# Patient Record
Sex: FEMALE | Race: WHITE | Employment: FULL TIME | URBAN - METROPOLITAN AREA
[De-identification: names, ages, dates, MRNs, and addresses within clinical notes are randomized per-mention and may not be internally consistent; named-entity substitution may affect disease eponyms.]

---

## 2017-03-14 ENCOUNTER — TELEPHONE (OUTPATIENT)
Dept: FAMILY MEDICINE CLINIC | Age: 62
End: 2017-03-14

## 2017-03-14 NOTE — TELEPHONE ENCOUNTER
PA initiated for Nexium     Generic Nexium-Esomeprazole was approved coverage 1/13/2017 - 3/14/2018, [id number].

## 2017-04-01 ENCOUNTER — OFFICE VISIT (OUTPATIENT)
Dept: FAMILY MEDICINE CLINIC | Age: 62
End: 2017-04-01

## 2017-04-01 VITALS
RESPIRATION RATE: 18 BRPM | TEMPERATURE: 98.1 F | HEART RATE: 88 BPM | HEIGHT: 62 IN | WEIGHT: 162.6 LBS | SYSTOLIC BLOOD PRESSURE: 120 MMHG | BODY MASS INDEX: 29.92 KG/M2 | OXYGEN SATURATION: 98 % | DIASTOLIC BLOOD PRESSURE: 80 MMHG

## 2017-04-01 DIAGNOSIS — J40 BRONCHITIS: ICD-10-CM

## 2017-04-01 DIAGNOSIS — J01.00 ACUTE NON-RECURRENT MAXILLARY SINUSITIS: Primary | ICD-10-CM

## 2017-04-01 RX ORDER — AMOXICILLIN AND CLAVULANATE POTASSIUM 875; 125 MG/1; MG/1
1 TABLET, FILM COATED ORAL 2 TIMES DAILY
Qty: 14 TAB | Refills: 0 | Status: SHIPPED | OUTPATIENT
Start: 2017-04-01 | End: 2017-04-08

## 2017-04-01 RX ORDER — BENZONATATE 100 MG/1
100 CAPSULE ORAL
Qty: 30 CAP | Refills: 0 | Status: SHIPPED | OUTPATIENT
Start: 2017-04-01 | End: 2017-04-11

## 2017-04-01 NOTE — MR AVS SNAPSHOT
Visit Information Date & Time Provider Department Dept. Phone Encounter #  
 4/1/2017  9:00 AM Sandra Barnes, [address] [facility] [phone] [id number] Follow-up Instructions Return in about 3 months (around 7/1/2017) for physical (no pap). Upcoming Health Maintenance Date Due DTaP/Tdap/Td series (1 - Tdap) 9/23/1977 INFLUENZA AGE 9 TO ADULT 8/1/2016 ZOSTER VACCINE AGE 60> 9/23/2016 BREAST CANCER SCRN MAMMOGRAM 3/8/2019 PAP AKA CERVICAL CYTOLOGY 5/1/2019 COLONOSCOPY 8/22/2019 Allergies as of 4/1/2017  Review Complete On: 4/1/2017 By: Sandra Barnes MD  
  
 Severity Noted Reaction Type Reactions Aspirin  09/12/2014    Nausea and Vomiting Betadine [Povidone-iodine]  12/20/2010    Rash  
 Nsaids (Non-steroidal Anti-inflammatory Drug)  12/20/2010    Nausea and Vomiting Other Medication  12/20/2010    Nausea and Vomiting General anas. .. - Pt does not know what this is. Current Immunizations  Never Reviewed No immunizations on file. Not reviewed this visit You Were Diagnosed With   
  
 Codes Comments Acute non-recurrent maxillary sinusitis    -  Primary ICD-10-CM: J01.00 ICD-9-CM: 461.0 Bronchitis     ICD-10-CM: J40 ICD-9-CM: 841 Vitals BP Pulse Temp Resp Height(growth percentile) Weight(growth percentile) 120/80 88 98.1 °F (36.7 °C) (Oral) 18 5' 2\" (1.575 m) 162 lb 9.6 oz (73.8 kg) SpO2 BMI OB Status Smoking Status 98% 29.74 kg/m2 Postmenopausal Never Smoker Vitals History BMI and BSA Data Body Mass Index Body Surface Area  
 29.74 kg/m 2 1.8 m 2 Preferred Pharmacy Pharmacy Name Phone [facility] [address], [address] [place] [address] [phone] Your Updated Medication List  
  
   
This list is accurate as of: 4/1/17  9:11 AM.  Always use your most recent med list.  
  
  
  
  
 amoxicillin-clavulanate 875-125 mg per tablet Commonly known as:  AUGMENTIN Take 1 Tab by mouth two (2) times a day for 7 days. aspirin delayed-release 81 mg tablet Take 1 Tab by mouth daily. benzonatate 100 mg capsule Commonly known as:  TESSALON Take 1 Cap by mouth three (3) times daily as needed for Cough for up to 10 days. celecoxib 200 mg capsule Commonly known as:  CELEBREX Take 1 Cap by mouth two (2) times a day. esomeprazole 40 mg capsule Commonly known as:  NexIUM  
TAKE ONE (1) CAPSULE(S) TWICE DAILY  
  
 metoprolol tartrate 50 mg tablet Commonly known as:  LOPRESSOR Take 1 Tab by mouth two (2) times a day. RESTASIS 0.05 % ophthalmic emulsion Generic drug:  cycloSPORINE Administer 1 Drop to both eyes two (2) times a day. SUMAtriptan 50 mg tablet Commonly known as:  IMITREX Take 1 Tab by mouth once as needed for Migraine for up to 1 dose. May repeat at 2 hrs  
  
 traMADol 50 mg tablet Commonly known as:  ULTRAM  
Take 1 Tab by mouth every six (6) hours as needed for Pain. VITAMIN B-12 1,000 mcg tablet Generic drug:  cyanocobalamin Take 1,000 mcg by mouth daily. VITAMIN D3 2,000 unit Tab Generic drug:  cholecalciferol (vitamin D3) Take  by mouth. Prescriptions Sent to Pharmacy Refills  
 amoxicillin-clavulanate (AUGMENTIN) 875-125 mg per tablet 0 Sig: Take 1 Tab by mouth two (2) times a day for 7 days. Class: Normal  
 Pharmacy: [address] Ph #: [phone] Route: Oral  
 benzonatate (TESSALON) 100 mg capsule 0 Sig: Take 1 Cap by mouth three (3) times daily as needed for Cough for up to 10 days. Class: Normal  
 Pharmacy: [address] Ph #: [phone] Route: Oral  
  
Follow-up Instructions Return in about 3 months (around 7/1/2017) for physical (no pap). Patient Instructions Sinusitis: Care Instructions Your Care Instructions Sinusitis is an infection of the lining of the sinus cavities in your head. Sinusitis often follows a cold. It causes pain and pressure in your head and face. In most cases, sinusitis gets better on its own in 1 to 2 weeks. But some mild symptoms may last for several weeks. Sometimes antibiotics are needed. Follow-up care is a key part of your treatment and safety. Be sure to make and go to all appointments, and call your doctor if you are having problems. It's also a good idea to know your test results and keep a list of the medicines you take. How can you care for yourself at home? · Take an over-the-counter pain medicine, such as acetaminophen (Tylenol), ibuprofen (Advil, Motrin), or naproxen (Aleve). Read and follow all instructions on the label. · If the doctor prescribed antibiotics, take them as directed. Do not stop taking them just because you feel better. You need to take the full course of antibiotics. · Be careful when taking over-the-counter cold or flu medicines and Tylenol at the same time. Many of these medicines have acetaminophen, which is Tylenol. Read the labels to make sure that you are not taking more than the recommended dose. Too much acetaminophen (Tylenol) can be harmful. · Breathe warm, moist air from a steamy shower, a hot bath, or a sink filled with hot water. Avoid cold, dry air. Using a humidifier in your home may help. Follow the directions for cleaning the machine. · Use saline (saltwater) nasal washes to help keep your nasal passages open and wash out mucus and bacteria. You can buy saline nose drops at a grocery store or drugstore.  Or you can make your own at home by adding 1 teaspoon of salt and 1 teaspoon of baking soda to 2 cups of distilled water. If you make your own, fill a bulb syringe with the solution, insert the tip into your nostril, and squeeze gently. Yoselin Fogo your nose. · Put a hot, wet towel or a warm gel pack on your face 3 or 4 times a day for 5 to 10 minutes each time. · Try a decongestant nasal spray like oxymetazoline (Afrin). Do not use it for more than 3 days in a row. Using it for more than 3 days can make your congestion worse. When should you call for help? Call your doctor now or seek immediate medical care if: 
· You have new or worse swelling or redness in your face or around your eyes. · You have a new or higher fever. Watch closely for changes in your health, and be sure to contact your doctor if: 
· You have new or worse facial pain. · The mucus from your nose becomes thicker (like pus) or has new blood in it. · You are not getting better as expected. Where can you learn more? Go to http://alley-myla.info/. Enter P941 in the search box to learn more about \"Sinusitis: Care Instructions. \" Current as of: July 29, 2016 Content Version: 11.2 © 8821-8208 cinvolve. Care instructions adapted under license by [employer] (which disclaims liability or warranty for this information). If you have questions about a medical condition or this instruction, always ask your healthcare professional. [place] [address] any warranty or liability for your use of this information. Introducing [facility]! Dear Marilee Gonsalez: Thank you for requesting a [employer] account. Our records indicate that you already have an active [employer] account. You can access your account anytime at [email or web address] Did you know that you can access your hospital and ER discharge instructions at any time in [employer]? You can also review all of your test results from your hospital stay or ER visit. Additional Information If you have questions, please visit the Frequently Asked Questions section of the [employer] website at [email or web address] Remember, [employer] is NOT to be used for urgent needs. For medical emergencies, dial 911. Now available from your iPhone and Android! Please provide this summary of care documentation to your next provider. Your primary care clinician is listed as [place] [address], [facility]/s . If you have any questions after today's visit, please call [phone].

## 2017-04-01 NOTE — PATIENT INSTRUCTIONS
Sinusitis: Care Instructions  Your Care Instructions    Sinusitis is an infection of the lining of the sinus cavities in your head. Sinusitis often follows a cold. It causes pain and pressure in your head and face. In most cases, sinusitis gets better on its own in 1 to 2 weeks. But some mild symptoms may last for several weeks. Sometimes antibiotics are needed. Follow-up care is a key part of your treatment and safety. Be sure to make and go to all appointments, and call your doctor if you are having problems. It's also a good idea to know your test results and keep a list of the medicines you take. How can you care for yourself at home? · Take an over-the-counter pain medicine, such as acetaminophen (Tylenol), ibuprofen (Advil, Motrin), or naproxen (Aleve). Read and follow all instructions on the label. · If the doctor prescribed antibiotics, take them as directed. Do not stop taking them just because you feel better. You need to take the full course of antibiotics. · Be careful when taking over-the-counter cold or flu medicines and Tylenol at the same time. Many of these medicines have acetaminophen, which is Tylenol. Read the labels to make sure that you are not taking more than the recommended dose. Too much acetaminophen (Tylenol) can be harmful. · Breathe warm, moist air from a steamy shower, a hot bath, or a sink filled with hot water. Avoid cold, dry air. Using a humidifier in your home may help. Follow the directions for cleaning the machine. · Use saline (saltwater) nasal washes to help keep your nasal passages open and wash out mucus and bacteria. You can buy saline nose drops at a grocery store or drugstore. Or you can make your own at home by adding 1 teaspoon of salt and 1 teaspoon of baking soda to 2 cups of distilled water. If you make your own, fill a bulb syringe with the solution, insert the tip into your nostril, and squeeze gently. Eva Dense your nose.   · Put a hot, wet towel or a warm gel pack on your face 3 or 4 times a day for 5 to 10 minutes each time. · Try a decongestant nasal spray like oxymetazoline (Afrin). Do not use it for more than 3 days in a row. Using it for more than 3 days can make your congestion worse. When should you call for help? Call your doctor now or seek immediate medical care if:  · You have new or worse swelling or redness in your face or around your eyes. · You have a new or higher fever. Watch closely for changes in your health, and be sure to contact your doctor if:  · You have new or worse facial pain. · The mucus from your nose becomes thicker (like pus) or has new blood in it. · You are not getting better as expected. Where can you learn more? Go to http://alley-myla.info/. Enter Y791 in the search box to learn more about \"Sinusitis: Care Instructions. \"  Current as of: July 29, 2016  Content Version: 11.2  © 0584-8452 [employer]. Care instructions adapted under license by [employer] (which disclaims liability or warranty for this information). If you have questions about a medical condition or this instruction, always ask your healthcare professional. [place] [address] any warranty or liability for your use of this information.

## 2017-04-01 NOTE — PROGRESS NOTES
Chief Complaint   Patient presents with    Cough     since Thursday, cough is productive with yellow green mucus. Patient states she has been having body aches. 1. Have you been to the ER, urgent care clinic since your last visit? Hospitalized since your last visit? No    2. Have you seen or consulted any other health care providers outside of the [facility] since your last visit? Include any pap smears or colon screening.  No

## 2017-05-17 ENCOUNTER — TELEPHONE (OUTPATIENT)
Dept: FAMILY MEDICINE CLINIC | Age: 62
End: 2017-05-17

## 2017-05-17 NOTE — TELEPHONE ENCOUNTER
Freeman Saravia   -    [phone]     -  Patient requesting status of Blood Work for 4-1-2017 visit-

## 2017-05-17 NOTE — TELEPHONE ENCOUNTER
Patient states she is having a breast MRI done and needs done prior to MRI.  Advised will need to go to a draw station to have labs done ordered by Milena Gustafson

## 2017-06-16 DIAGNOSIS — M79.7 FIBROMYALGIA: ICD-10-CM

## 2017-06-16 RX ORDER — TRAMADOL HYDROCHLORIDE 50 MG/1
TABLET ORAL
Qty: 30 TAB | Refills: 0 | Status: SHIPPED | OUTPATIENT
Start: 2017-06-16 | End: 2017-07-14 | Stop reason: SDUPTHER

## 2017-06-28 DIAGNOSIS — K21.00 GASTROESOPHAGEAL REFLUX DISEASE WITH ESOPHAGITIS: ICD-10-CM

## 2017-06-28 RX ORDER — ESOMEPRAZOLE MAGNESIUM 40 MG/1
CAPSULE, DELAYED RELEASE ORAL
Qty: 60 CAP | Refills: 11 | Status: SHIPPED | OUTPATIENT
Start: 2017-06-28 | End: 2017-07-20 | Stop reason: SDUPTHER

## 2017-06-28 NOTE — TELEPHONE ENCOUNTER
Received faxed refill request for this medication from the pharmacy that is on file.    esomeprazole (NEXIUM) 40 mg capsule  TAKE ONE (1) CAPSULE(S) TWICE DAILY, Normal, Disp-60 Cap, R-5

## 2017-07-14 DIAGNOSIS — M79.7 FIBROMYALGIA: ICD-10-CM

## 2017-07-17 RX ORDER — TRAMADOL HYDROCHLORIDE 50 MG/1
TABLET ORAL
Qty: 30 TAB | Refills: 0 | Status: SHIPPED | OUTPATIENT
Start: 2017-07-17 | End: 2017-07-20 | Stop reason: SDUPTHER

## 2017-07-20 ENCOUNTER — OFFICE VISIT (OUTPATIENT)
Dept: FAMILY MEDICINE CLINIC | Age: 62
End: 2017-07-20

## 2017-07-20 VITALS
DIASTOLIC BLOOD PRESSURE: 88 MMHG | RESPIRATION RATE: 18 BRPM | HEIGHT: 62 IN | SYSTOLIC BLOOD PRESSURE: 134 MMHG | TEMPERATURE: 97.8 F | BODY MASS INDEX: 30.8 KG/M2 | HEART RATE: 70 BPM | WEIGHT: 167.4 LBS | OXYGEN SATURATION: 95 %

## 2017-07-20 DIAGNOSIS — K21.00 GASTROESOPHAGEAL REFLUX DISEASE WITH ESOPHAGITIS: ICD-10-CM

## 2017-07-20 DIAGNOSIS — Z86.69 HISTORY OF MIGRAINE HEADACHES: Primary | Chronic | ICD-10-CM

## 2017-07-20 DIAGNOSIS — L30.9 ECZEMA, UNSPECIFIED TYPE: ICD-10-CM

## 2017-07-20 DIAGNOSIS — M25.50 ARTHRALGIA OF MULTIPLE JOINTS: ICD-10-CM

## 2017-07-20 DIAGNOSIS — M19.90 ARTHRITIS: ICD-10-CM

## 2017-07-20 DIAGNOSIS — R23.3 EASY BRUISING: ICD-10-CM

## 2017-07-20 DIAGNOSIS — D05.11 DUCTAL CARCINOMA IN SITU (DCIS) OF RIGHT BREAST: ICD-10-CM

## 2017-07-20 DIAGNOSIS — M79.7 FIBROMYALGIA: ICD-10-CM

## 2017-07-20 RX ORDER — CELECOXIB 200 MG/1
200 CAPSULE ORAL 2 TIMES DAILY
Qty: 180 CAP | Refills: 1 | Status: SHIPPED | OUTPATIENT
Start: 2017-07-20 | End: 2018-01-07 | Stop reason: SDUPTHER

## 2017-07-20 RX ORDER — METOPROLOL TARTRATE 50 MG/1
50 TABLET ORAL 2 TIMES DAILY
Qty: 180 TAB | Refills: 1 | Status: SHIPPED | OUTPATIENT
Start: 2017-07-20 | End: 2018-01-07 | Stop reason: SDUPTHER

## 2017-07-20 RX ORDER — TRAMADOL HYDROCHLORIDE 50 MG/1
TABLET ORAL
Qty: 90 TAB | Refills: 0 | Status: SHIPPED | OUTPATIENT
Start: 2017-07-20 | End: 2017-10-27 | Stop reason: SDUPTHER

## 2017-07-20 RX ORDER — SUMATRIPTAN 50 MG/1
50 TABLET, FILM COATED ORAL
Qty: 10 TAB | Refills: 5 | Status: SHIPPED | OUTPATIENT
Start: 2017-07-20 | End: 2018-09-09 | Stop reason: SDUPTHER

## 2017-07-20 RX ORDER — ESOMEPRAZOLE MAGNESIUM 40 MG/1
CAPSULE, DELAYED RELEASE ORAL
Qty: 180 CAP | Refills: 3 | Status: SHIPPED | OUTPATIENT
Start: 2017-07-20 | End: 2018-01-15 | Stop reason: SDUPTHER

## 2017-07-20 NOTE — PROGRESS NOTES
HISTORY OF PRESENT ILLNESS  HPI  Bay Macias is a 62 y.o. Female with history of migraines, HTN, fibromyalgia, and GERD who presents to office today for fasting follow-up. Pt complains of increased bilateral hand swelling and stiffness for the past month. She also notes intermittent bruising on her right hand that appears in the afternoon and resolves after a week or so. Pt complains of patches of dry, rough skin. She has been using [employer], as well as Lubriderm lotion 4-5 times a day without relief. She denies itching. Past Medical History:   Diagnosis Date    Arthritis 2010    Cancer [facility])     right breast cancer    Colon polyp     DCIS (ductal carcinoma in situ) of breast 2010    right/surgery and radiation    Fibromyalgia 2010    GERD (gastroesophageal reflux disease) 2010    History of migraine headaches 2017    Migraine 2010    Nausea & vomiting      Past Surgical History:   Procedure Laterality Date    COLONOSCOPY N/A 2016    COLONOSCOPY performed by Yaneli Jung MD at [address] [place] [address]       x2    HX APPENDECTOMY      HX MASTECTOMY  2008    right,then reconstruction/implant     Current Outpatient Prescriptions on File Prior to Visit   Medication Sig Dispense Refill    cholecalciferol, vitamin D3, (VITAMIN D3) 2,000 unit tab Take  by mouth.  aspirin delayed-release 81 mg tablet Take 1 Tab by mouth daily.  cyanocobalamin (VITAMIN B-12) 1,000 mcg tablet Take 1,000 mcg by mouth daily.  cyclosporine (RESTASIS) 0.05 % ophthalmic emulsion Administer 1 Drop to both eyes two (2) times a day. No current facility-administered medications on file prior to visit. Allergies   Allergen Reactions    Aspirin Nausea and Vomiting    Betadine [Povidone-Iodine] Rash    Nsaids (Non-Steroidal Anti-Inflammatory Drug) Nausea and Vomiting    Other Medication Nausea and Vomiting     General anas. .. - Pt does not know what this is. Family History   Problem Relation Age of Onset    Coronary Artery Disease Mother     Hypertension Mother     Heart Disease Mother     Diabetes Mother     Pulmonary Embolism Mother     Coronary Artery Disease Father     Heart Disease Father     Hypertension Father     Kidney Disease Father     Thyroid Disease Father      Social History     Social History    Marital status:      Spouse name: N/A    Number of children: N/A    Years of education: N/A     Social History Main Topics    Smoking status: Never Smoker    Smokeless tobacco: Never Used    Alcohol use No    Drug use: No    Sexual activity: Not Currently     Other Topics Concern    None     Social History Narrative             Review of Systems   Constitutional: Negative for chills, diaphoresis, fever, malaise/fatigue and weight loss. Eyes: Negative for blurred vision, double vision, pain and redness. Respiratory: Negative for cough, shortness of breath and wheezing. Cardiovascular: Negative for chest pain, palpitations, orthopnea, claudication, leg swelling and PND. Musculoskeletal: Positive for joint pain (Bilateral hand swelling & stiffness). Skin: Negative for itching and rash. Neurological: Negative for dizziness, tingling, tremors, sensory change, speech change, focal weakness, seizures, loss of consciousness, weakness and headaches. Results for orders placed or performed in visit on 12/19/16   LIPID PANEL   Result Value Ref Range    Cholesterol, Total 169     Triglycerides 109     HDL Cholesterol 70     LDL Cholesterol 77              Physical Exam  Visit Vitals    /88 (BP 1 Location: Left arm, BP Patient Position: Sitting)    Pulse 70    Temp 97.8 °F (36.6 °C) (Oral)    Resp 18    Ht 5' 2\" (1.575 m)    Wt 167 lb 6.4 oz (75.9 kg)    SpO2 95%    BMI 30.62 kg/m2     Head: Normocephalic, without obvious abnormality, atraumatic  Eyes: conjunctivae/corneas clear.  PERRL, EOM's intact. Neck: supple, symmetrical, trachea midline, no adenopathy, thyroid: not enlarged, symmetric, no tenderness/mass/nodules, no carotid bruit and no JVD  Lungs: clear to auscultation bilaterally  Heart: regular rate and rhythm, S1, S2 normal, no murmur, click, rub or gallop  Extremities: extremities normal, atraumatic, no cyanosis or edema. She has some tenderness to palpation of her MCP joints less so over the DIP and PIP joints, no appreciable swelling or bruising on her hands today that I can see  Pulses: 2+ and symmetric  Lymph nodes: Cervical, supraclavicular, and axillary nodes normal.  Neurologic: Grossly normal            ASSESSMENT and PLAN    ICD-10-CM ICD-9-CM    1. History of migraine headaches Z86.69 V12.49 SUMAtriptan (IMITREX) 50 mg tablet   2. Fibromyalgia K75.4 169.6 METABOLIC PANEL, COMPREHENSIVE      traMADol (ULTRAM) 50 mg tablet      metoprolol tartrate (LOPRESSOR) 50 mg tablet   3. Gastroesophageal reflux disease with esophagitis K21.0 530.11 esomeprazole (NEXIUM) 40 mg capsule   4. Arthritis E65.39 698.43 METABOLIC PANEL, COMPREHENSIVE      LIPID PANEL      celecoxib (CELEBREX) 200 mg capsule      metoprolol tartrate (LOPRESSOR) 50 mg tablet   5. Arthralgia of multiple joints M25.50 719.49 SED RATE (ESR)      GEO QL, W/REFLEX CASCADE      CBC WITH AUTOMATED DIFF      PROTHROMBIN TIME + INR   6. Easy bruising R23.8 782.9 CBC WITH AUTOMATED DIFF      PROTHROMBIN TIME + INR   7. 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 Diagnoses and all orders for this visit:    History of migraine headaches  -     SUMAtriptan (IMITREX) 50 mg tablet; Take 1 Tab by mouth once as needed for Migraine for up to 1 dose.  May repeat at 2 hrs    Fibromyalgia  -     METABOLIC PANEL, COMPREHENSIVE  -     traMADol (ULTRAM) 50 mg tablet; TAKE 1 TABLET BY MOUTH EVERY 6 HOURS AS NEEDED FOR PAIN  -     metoprolol tartrate (LOPRESSOR) 50 mg tablet; Take 1 Tab by mouth two (2) times a day. Gastroesophageal reflux disease with esophagitis  -     esomeprazole (NEXIUM) 40 mg capsule; TAKE ONE (1) CAPSULE(S) TWICE DAILY    Arthritis  -     METABOLIC PANEL, COMPREHENSIVE  -     LIPID PANEL  -     celecoxib (CELEBREX) 200 mg capsule; Take 1 Cap by mouth two (2) times a day. -     metoprolol tartrate (LOPRESSOR) 50 mg tablet; Take 1 Tab by mouth two (2) times a day. 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 .     lab results and schedule of future lab studies reviewed with patient  reviewed diet, exercise and weight control  cardiovascular risk and specific lipid/LDL goals reviewed  reviewed medications and side effects in detail  Please call my office if there are any questions- [phone]. I will arrange for follow up after the lab tests done from today return  Recommended a weekly \"heart check. \" I went into detail how to do this. Call for refills on medications as needed. Discussed expected course/resolution/complications of diagnosis in detail with patient. Medication risks/benefits/costs/interactions/alternatives discussed with patient. Pt was given an after visit summary which includes diagnoses, current medications & vitals. Pt expressed understanding with the diagnosis and plan. Total 45 minutes,60 % counseling re: I checked some lab work to see what might be increasing her bruising.  It's conceivable that this is all from the aspirin she's taking, but we'll rule out other causes. .  For her joint discomfort, we'll do some baseline blood tests, and in the meantime she can continue with the Celebrex, tramadol, and tylenol. If all the tests are normal, we'll get her over to the rheumatologist for evaluation. Because there is heart disease in the family, both CAD and A-fib, I encouraged her to do a weekly \"heart check\". I suggested because she has knee discomfort that she might consider doing that on an exercise bike. She should start an exercise regimen on the bike working her way up to 30 minutes three times a week to help her knees out. I talked to her about how to keep her skin from drying out, continuing with the [facility] and applying a thin film of Vaseline after showering. We'll have her use Kenalog on the dry patches of skin. I told her that this would control her problem, not cure it, and that whatever she finds works for her skin she'll have to do daily to keep it healthy. Also, discussed symptoms of concern that were noted today in the note above, treatment options( including doing nothing), when to follow up before recommended time frame. Also, answered all questions    This document was written by Miri Richards, as dictated by Joyce Hernández MD.  I have reviewed and agree with the above note and have made corrections where appropriate Wes Jones M.D.

## 2017-07-20 NOTE — PROGRESS NOTES
\"Reviewed record in preparation for visit and have obtained the necessary documentation\"  Chief Complaint   Patient presents with    Hypertension     follow up     Fibromyalgia     follow up      Patient presents in the office today for a follow up of hypertension and fibromyalgia     1. Have you been to the ER, urgent care clinic since your last visit? Hospitalized since your last visit? No    2. Have you seen or consulted any other health care providers outside of the [address] [place] [address] since your last visit? Include any pap smears or colon screening.  No           PHQ over the last two weeks 7/20/2017   Little interest or pleasure in doing things Not at all   Feeling down, depressed or hopeless Not at all   Total Score PHQ 2 0

## 2017-07-20 NOTE — PATIENT INSTRUCTIONS
Fibromyalgia: Care Instructions  Your Care Instructions  Fibromyalgia is a painful condition that is not completely understood by medical experts. The cause of fibromyalgia is not known. It can make you feel tired and ache all over. It causes tender spots at specific points of the body that hurt only when you press on them. You may have trouble sleeping, as well as other symptoms. These problems can upset your work and home life. Symptoms tend to come and go, although they may never go away completely. Fibromyalgia does not harm your muscles, joints, or organs. Follow-up care is a key part of your treatment and safety. Be sure to make and go to all appointments, and call your doctor if you are having problems. It's also a good idea to know your test results and keep a list of the medicines you take. How can you care for yourself at home? · Exercise often. Walk, swim, or bike to help with pain and sleep problems and to make you feel better. · Try to get a good night's sleep. Go to bed and get up at the same time each day, whether you feel rested or not. Make sure you have a good mattress and pillow. · Reduce stress. Avoid things that cause you stress, if you can. If not, work at making them less stressful. Learn to use biofeedback, guided imagery, meditation, or other methods to relax. · Make healthy changes. Eat a balanced diet, quit smoking, and limit alcohol and caffeine. · Use a heating pad set on low or take warm baths or showers for pain. Using cold packs for up to 20 minutes at a time can also relieve pain. Put a thin cloth between the cold pack and your skin. A gentle massage might help too. · Be safe with medicines. Take your medicines exactly as prescribed. Call your doctor if you think you are having a problem with your medicine. Your doctor may talk to you about taking antidepressant medicines. These medicines may improve sleep, relieve pain, and in some cases treat depression.   · Learn about fibromyalgia. This makes coping easier. Then, take an active role in your treatment. · Think about joining a support group with others who have fibromyalgia to learn more and get support. When should you call for help? Watch closely for changes in your health, and be sure to contact your doctor if:  · You feel sad, helpless, or hopeless; lose interest in things you used to enjoy; or have other symptoms of depression. · Your fibromyalgia symptoms get worse. Where can you learn more? Go to http://alley-myla.info/. Enter V003 in the search box to learn more about \"Fibromyalgia: Care Instructions. \"  Current as of: October 14, 2016  Content Version: 11.3  © 8234-3961 [employer]. Care instructions adapted under license by [employer] (which disclaims liability or warranty for this information). If you have questions about a medical condition or this instruction, always ask your healthcare professional. [address] any warranty or liability for your use of this information.

## 2017-07-20 NOTE — LETTER
8/2/2017 8:13 AM 
 
Ms. Benetta Cabot [address] [id number] Dear Benetta Cabot: Please find your most recent results below. Resulted Orders METABOLIC PANEL, COMPREHENSIVE Result Value Ref Range Glucose 93 65 - 99 mg/dL BUN 14 8 - 27 mg/dL Creatinine 0.70 0.57 - 1.00 mg/dL GFR est non-AA 94 >59 mL/min/1.73 GFR est  >59 mL/min/1.73  
 BUN/Creatinine ratio 20 12 - 28 Sodium 144 134 - 144 mmol/L Potassium 4.3 3.5 - 5.2 mmol/L Chloride 102 96 - 106 mmol/L  
 CO2 23 18 - 29 mmol/L Calcium 9.4 8.7 - 10.3 mg/dL Protein, total 6.9 6.0 - 8.5 g/dL Albumin 4.4 3.6 - 4.8 g/dL GLOBULIN, TOTAL 2.5 1.5 - 4.5 g/dL A-G Ratio 1.8 1.2 - 2.2 Bilirubin, total 0.6 0.0 - 1.2 mg/dL Alk. phosphatase 165 (H) 39 - 117 IU/L  
 AST (SGOT) 16 0 - 40 IU/L  
 ALT (SGPT) 21 0 - 32 IU/L Narrative Performed at:  [address]  [id number] : Sue Addison MD, Phone:  [phone] LIPID PANEL Result Value Ref Range Cholesterol, total 164 100 - 199 mg/dL Triglyceride 109 0 - 149 mg/dL HDL Cholesterol 63 >39 mg/dL VLDL, calculated 22 5 - 40 mg/dL LDL, calculated 79 0 - 99 mg/dL Narrative Performed at:  [address]  [id number] : Sue Addison MD, Phone:  [phone] SED RATE (ESR) Result Value Ref Range Sed rate (ESR) 3 0 - 40 mm/hr Narrative Performed at:  [address]  [id number] : Sue Addison MD, Phone:  [phone] GEO QL, W/REFLEX CASCADE Result Value Ref Range GEO Direct Negative Negative See below Comment Comment:  
   Autoantibody                       Disease Association 
____________________________________________________________ Condition                  Frequency _____________________   ________________________   _________ Antinuclear Antibody,    SLE, mixed connective Direct (GEO-D)           tissue diseases 
_____________________   ________________________   _________ 
dsDNA                    SLE                        40 - 60% 
_____________________   ________________________   _________ Chromatin                Drug induced SLE                90% SLE                        48 - 97% 
_____________________   ________________________   _________ 
[facility] ([place])                 SLE                        25 - 35% Sjogren's Syndrome         40 - 70%  Lupus                 100% 
_____________________   ________________________   _________ 
[facility] ([place])                 SLE 10% Sjogren's Syndrome              30% 
_____________________   _______________________    _________ Sm (anti-Smith)          SLE                        15 - 30% 
_____________________   _______________________    _________ 
RNP                      Mixed Connective Tissue Disease                         95% 
(U1 nRNP,                SLE                        30 - 50% 
anti-ribonucleoprotein)  Polymyositis and/or Dermatomyositis                 20% 
_____________________   ________________________   _________ Scl-70 (antiDNA          Scleroderma (diffuse)      20 - 35% 
topoisomerase)           Crest                           13% 
_____________________   ________________________   _________ Cindi-1                     Polymyositis and/or Dermatomyositis            20 - 40% 
_____________________   ________________________   _________ Centromere B             Scleroderma - 
 Crest 
                         variant                         80% _____________________   ________________________   _________ Ribosomal P              SLE                        10 - 20% Narrative Performed at:  [address]  [id number] : Terrie Gaspar MD, Phone:  [phone] CBC WITH AUTOMATED DIFF Result Value Ref Range WBC 6.1 3.4 - 10.8 x10E3/uL  
 RBC 4.52 3.77 - 5.28 x10E6/uL HGB 14.2 11.1 - 15.9 g/dL HCT 41.6 34.0 - 46.6 % MCV 92 79 - 97 fL  
 MCH 31.4 26.6 - 33.0 pg  
 MCHC 34.1 31.5 - 35.7 g/dL  
 RDW 14.3 12.3 - 15.4 % PLATELET 397 777 - 160 x10E3/uL NEUTROPHILS 60 % Lymphocytes 28 % MONOCYTES 8 % EOSINOPHILS 2 % BASOPHILS 1 %  
 ABS. NEUTROPHILS 3.7 1.4 - 7.0 x10E3/uL Abs Lymphocytes 1.7 0.7 - 3.1 x10E3/uL  
 ABS. MONOCYTES 0.5 0.1 - 0.9 x10E3/uL  
 ABS. EOSINOPHILS 0.1 0.0 - 0.4 x10E3/uL  
 ABS. BASOPHILS 0.1 0.0 - 0.2 x10E3/uL IMMATURE GRANULOCYTES 1 %  
 ABS. IMM. GRANS. 0.0 0.0 - 0.1 x10E3/uL Narrative Performed at:  [address]  [id number] : Terrie Gaspar MD, Phone:  [phone] PROTHROMBIN TIME + INR Result Value Ref Range INR 0.9 0.8 - 1.2 Comment:  
   Reference interval is for non-anticoagulated patients. Suggested INR therapeutic range for Vitamin K 
antagonist therapy: 
   Standard Dose (moderate intensity 
                  therapeutic range):       2.0 - 3.0 Higher intensity therapeutic range       2.5 - 3.5 Prothrombin time 9.8 9.1 - 12.0 sec Narrative Performed at:  [address]  [id number] : Terrie Gaspar MD, Phone:  [phone] CVD REPORT Result Value Ref Range INTERPRETATION Note Comment:  
   Supplement report is available. Narrative Performed at:  [address] [place] [address]  [id number] : Josette Epperson PhD, Phone:  [phone] RECOMMENDATIONS: 
Good news! ! As you can see above, your lab tests done recently all came back normal; no signs of diabetes, excellent cholesterol levels, normal liver and kidney function. I would recommend that you follow up for a full physical every year after the age of 61. I would have you see a rheumatologist if your joints are continuing to bother you. Try Extra Strength Tylenol 500 mg 1-2 every 6 hours as needed for pain. Please call me if you have any questions: [phone] Sincerely, 
 
 
Stephon Gonzalez MD

## 2017-07-21 LAB
ALBUMIN SERPL-MCNC: 4.4 G/DL (ref 3.6–4.8)
ALBUMIN/GLOB SERPL: 1.8 {RATIO} (ref 1.2–2.2)
ALP SERPL-CCNC: 165 IU/L (ref 39–117)
ALT SERPL-CCNC: 21 IU/L (ref 0–32)
ANA SER QL: NEGATIVE
AST SERPL-CCNC: 16 IU/L (ref 0–40)
BASOPHILS # BLD AUTO: 0.1 X10E3/UL (ref 0–0.2)
BASOPHILS NFR BLD AUTO: 1 %
BILIRUB SERPL-MCNC: 0.6 MG/DL (ref 0–1.2)
BUN SERPL-MCNC: 14 MG/DL (ref 8–27)
BUN/CREAT SERPL: 20 (ref 12–28)
CALCIUM SERPL-MCNC: 9.4 MG/DL (ref 8.7–10.3)
CHLORIDE SERPL-SCNC: 102 MMOL/L (ref 96–106)
CHOLEST SERPL-MCNC: 164 MG/DL (ref 100–199)
CO2 SERPL-SCNC: 23 MMOL/L (ref 18–29)
CREAT SERPL-MCNC: 0.7 MG/DL (ref 0.57–1)
EOSINOPHIL # BLD AUTO: 0.1 X10E3/UL (ref 0–0.4)
EOSINOPHIL NFR BLD AUTO: 2 %
ERYTHROCYTE [DISTWIDTH] IN BLOOD BY AUTOMATED COUNT: 14.3 % (ref 12.3–15.4)
ERYTHROCYTE [SEDIMENTATION RATE] IN BLOOD BY WESTERGREN METHOD: 3 MM/HR (ref 0–40)
GLOBULIN SER CALC-MCNC: 2.5 G/DL (ref 1.5–4.5)
GLUCOSE SERPL-MCNC: 93 MG/DL (ref 65–99)
HCT VFR BLD AUTO: 41.6 % (ref 34–46.6)
HDLC SERPL-MCNC: 63 MG/DL
HGB BLD-MCNC: 14.2 G/DL (ref 11.1–15.9)
IMM GRANULOCYTES # BLD: 0 X10E3/UL (ref 0–0.1)
IMM GRANULOCYTES NFR BLD: 1 %
INR PPP: 0.9 (ref 0.8–1.2)
INTERPRETATION, 910389: NORMAL
LDLC SERPL CALC-MCNC: 79 MG/DL (ref 0–99)
LYMPHOCYTES # BLD AUTO: 1.7 X10E3/UL (ref 0.7–3.1)
LYMPHOCYTES NFR BLD AUTO: 28 %
MCH RBC QN AUTO: 31.4 PG (ref 26.6–33)
MCHC RBC AUTO-ENTMCNC: 34.1 G/DL (ref 31.5–35.7)
MCV RBC AUTO: 92 FL (ref 79–97)
MONOCYTES # BLD AUTO: 0.5 X10E3/UL (ref 0.1–0.9)
MONOCYTES NFR BLD AUTO: 8 %
NEUTROPHILS # BLD AUTO: 3.7 X10E3/UL (ref 1.4–7)
NEUTROPHILS NFR BLD AUTO: 60 %
PLATELET # BLD AUTO: 311 X10E3/UL (ref 150–379)
POTASSIUM SERPL-SCNC: 4.3 MMOL/L (ref 3.5–5.2)
PROT SERPL-MCNC: 6.9 G/DL (ref 6–8.5)
PROTHROMBIN TIME: 9.8 SEC (ref 9.1–12)
RBC # BLD AUTO: 4.52 X10E6/UL (ref 3.77–5.28)
SEE BELOW:, 164879: NORMAL
SODIUM SERPL-SCNC: 144 MMOL/L (ref 134–144)
TRIGL SERPL-MCNC: 109 MG/DL (ref 0–149)
VLDLC SERPL CALC-MCNC: 22 MG/DL (ref 5–40)
WBC # BLD AUTO: 6.1 X10E3/UL (ref 3.4–10.8)

## 2017-07-21 RX ORDER — TRIAMCINOLONE ACETONIDE 1 MG/G
CREAM TOPICAL 2 TIMES DAILY
Qty: 15 G | Refills: 0 | Status: SHIPPED | OUTPATIENT
Start: 2017-07-21 | End: 2018-01-21 | Stop reason: ALTCHOICE

## 2017-08-02 NOTE — PROGRESS NOTES
Good news! ! As you can see above, your lab tests done recently all came back normal; no signs of diabetes, excellent cholesterol levels, normal liver and kidney function. I would recommend that you follow up for a full physical every year after the age of 62. I would have you see a rheumatologist if your joints are continuing to bother you. Try Extra Strength Tylenol 500 mg 1-2 every 6 hours as needed for pain.

## 2017-10-27 DIAGNOSIS — M79.7 FIBROMYALGIA: ICD-10-CM

## 2017-10-30 RX ORDER — TRAMADOL HYDROCHLORIDE 50 MG/1
TABLET ORAL
Qty: 90 TAB | Refills: 0 | OUTPATIENT
Start: 2017-10-30 | End: 2018-01-15 | Stop reason: SDUPTHER

## 2018-01-07 DIAGNOSIS — M79.7 FIBROMYALGIA: ICD-10-CM

## 2018-01-07 DIAGNOSIS — M19.90 ARTHRITIS: ICD-10-CM

## 2018-01-12 RX ORDER — CELECOXIB 200 MG/1
CAPSULE ORAL
Qty: 180 CAP | Refills: 0 | Status: SHIPPED | OUTPATIENT
Start: 2018-01-12 | End: 2018-01-15 | Stop reason: SDUPTHER

## 2018-01-12 RX ORDER — METOPROLOL TARTRATE 50 MG/1
TABLET ORAL
Qty: 180 TAB | Refills: 0 | Status: SHIPPED | OUTPATIENT
Start: 2018-01-12 | End: 2018-01-15 | Stop reason: SDUPTHER

## 2018-01-15 ENCOUNTER — OFFICE VISIT (OUTPATIENT)
Dept: FAMILY MEDICINE CLINIC | Age: 63
End: 2018-01-15

## 2018-01-15 VITALS
DIASTOLIC BLOOD PRESSURE: 78 MMHG | RESPIRATION RATE: 18 BRPM | BODY MASS INDEX: 30.59 KG/M2 | WEIGHT: 166.2 LBS | OXYGEN SATURATION: 95 % | TEMPERATURE: 98 F | SYSTOLIC BLOOD PRESSURE: 120 MMHG | HEART RATE: 67 BPM | HEIGHT: 62 IN

## 2018-01-15 DIAGNOSIS — M85.80 OSTEOPENIA DETERMINED BY X-RAY: ICD-10-CM

## 2018-01-15 DIAGNOSIS — Z86.69 HISTORY OF MIGRAINE HEADACHES: Chronic | ICD-10-CM

## 2018-01-15 DIAGNOSIS — M79.7 FIBROMYALGIA: Primary | ICD-10-CM

## 2018-01-15 DIAGNOSIS — M19.90 ARTHRITIS: ICD-10-CM

## 2018-01-15 DIAGNOSIS — K21.00 GASTROESOPHAGEAL REFLUX DISEASE WITH ESOPHAGITIS: ICD-10-CM

## 2018-01-15 RX ORDER — ESOMEPRAZOLE MAGNESIUM 40 MG/1
CAPSULE, DELAYED RELEASE ORAL
Qty: 180 CAP | Refills: 3 | Status: SHIPPED | OUTPATIENT
Start: 2018-01-15 | End: 2018-04-10 | Stop reason: SDUPTHER

## 2018-01-15 RX ORDER — TRAMADOL HYDROCHLORIDE 50 MG/1
TABLET ORAL
Qty: 90 TAB | Refills: 5 | Status: SHIPPED | OUTPATIENT
Start: 2018-01-15 | End: 2018-08-06 | Stop reason: SDUPTHER

## 2018-01-15 RX ORDER — CELECOXIB 200 MG/1
CAPSULE ORAL
Qty: 180 CAP | Refills: 1 | Status: SHIPPED | OUTPATIENT
Start: 2018-01-15 | End: 2019-06-15 | Stop reason: SDUPTHER

## 2018-01-15 RX ORDER — METOPROLOL TARTRATE 50 MG/1
TABLET ORAL
Qty: 180 TAB | Refills: 1 | Status: SHIPPED | OUTPATIENT
Start: 2018-01-15 | End: 2018-11-03 | Stop reason: SDUPTHER

## 2018-01-15 NOTE — PROGRESS NOTES
\"Reviewed record in preparation for visit and have obtained the necessary documentation\"  Chief Complaint   Patient presents with    Fibromyalgia     follow up     Hypertension     follow up        1. Have you been to the ER, urgent care clinic since your last visit? Hospitalized since your last visit? No    2. Have you seen or consulted any other health care providers outside of the [address] [place] [address] since your last visit? Include any pap smears or colon screening.  No

## 2018-01-15 NOTE — MR AVS SNAPSHOT
[address] 
 
 
 [address] 
[phone] Patient: Kasandra Domínguez MRN: [id number] RYQ:[id number] Visit Information Date & Time Provider Department Dept. Phone Encounter #  
 1/15/2018 11:30 AM Iris Aggarwal  [facility] [phone] [id number] Your Appointments 7/19/2018  8:00 AM  
ROUTINE CARE with Iris Aggarwal MD  
[facility]) Appt Note: Fasting 6 month follow up fibromyalgia and HTN  
 [address] [phone]  
[phone]  
  
   
 [address] [address] Upcoming Health Maintenance Date Due DTaP/Tdap/Td series (1 - Tdap) 9/23/1977 ZOSTER VACCINE AGE 60> 7/23/2016 Influenza Age 5 to Adult 8/1/2017 PAP AKA CERVICAL CYTOLOGY 5/1/2019 BREAST CANCER SCRN MAMMOGRAM 6/12/2019 COLONOSCOPY 8/22/2019 Allergies as of 1/15/2018  Review Complete On: 1/15/2018 By: Charly Romero LPN Severity Noted Reaction Type Reactions Aspirin  09/12/2014    Nausea and Vomiting Betadine [Povidone-iodine]  12/20/2010    Rash  
 Nsaids (Non-steroidal Anti-inflammatory Drug)  12/20/2010    Nausea and Vomiting Other Medication  12/20/2010    Nausea and Vomiting General anas. .. - Pt does not know what this is. Current Immunizations  Never Reviewed No immunizations on file. Not reviewed this visit You Were Diagnosed With   
  
 Codes Comments History of migraine headaches    -  Primary ICD-10-CM: Z86.69 
ICD-9-CM: V12.49 Fibromyalgia     ICD-10-CM: M79.7 ICD-9-CM: 729.1 Arthritis     ICD-10-CM: M19.90 ICD-9-CM: 716.90 Gastroesophageal reflux disease with esophagitis     ICD-10-CM: K21.0 ICD-9-CM: 530.11 Vitals BP Pulse Temp Resp Height(growth percentile) Weight(growth percentile)  120/78 (BP 1 Location: Left arm, BP Patient Position: Sitting) 67 98 °F (36.7 °C) (Oral) 18 5' 2\" (1.575 m) 166 lb 3.2 oz (75.4 kg) SpO2 BMI OB Status Smoking Status 95% 30.4 kg/m2 Postmenopausal Never Smoker Vitals History BMI and BSA Data Body Mass Index Body Surface Area  
 30.4 kg/m 2 1.82 m 2 Preferred Pharmacy Pharmacy Name Phone [facility] [address] [place] [address], [facility] [phone] Your Updated Medication List  
  
   
This list is accurate as of: 1/15/18  1:16 PM.  Always use your most recent med list.  
  
  
  
  
 aspirin delayed-release 81 mg tablet Take 1 Tab by mouth daily. celecoxib 200 mg capsule Commonly known as:  CELEBREX  
TAKE 1 CAPSULE BY MOUTH TWICE DAILY  
  
 esomeprazole 40 mg capsule Commonly known as:  NexIUM  
TAKE ONE (1) CAPSULE(S) TWICE DAILY  
  
 metoprolol tartrate 50 mg tablet Commonly known as:  LOPRESSOR  
TAKE 1 TABLET BY MOUTH TWICE DAILY RESTASIS 0.05 % ophthalmic emulsion Generic drug:  cycloSPORINE Administer 1 Drop to both eyes two (2) times a day. SUMAtriptan 50 mg tablet Commonly known as:  IMITREX Take 1 Tab by mouth once as needed for Migraine for up to 1 dose. May repeat at 2 hrs  
  
 traMADol 50 mg tablet Commonly known as:  ULTRAM  
TAKE 1 TABLET BY MOUTH EVERY 6 HOURS AS NEEDED FOR PAIN  
  
 triamcinolone acetonide 0.1 % topical cream  
Commonly known as:  KENALOG Apply  to affected area two (2) times a day. use thin layer VITAMIN B-12 1,000 mcg tablet Generic drug:  cyanocobalamin Take 1,000 mcg by mouth daily. VITAMIN D3 2,000 unit Tab Generic drug:  cholecalciferol (vitamin D3) Take  by mouth. Prescriptions Printed Refills  
 traMADol (ULTRAM) 50 mg tablet 5 Sig: TAKE 1 TABLET BY MOUTH EVERY 6 HOURS AS NEEDED FOR PAIN Class: Print Prescriptions Sent to Pharmacy  Refills  
 metoprolol tartrate (LOPRESSOR) 50 mg tablet 1  
 Sig: TAKE 1 TABLET BY MOUTH TWICE DAILY Class: Normal  
 Pharmacy: [facility] [address] Ph #: [phone]  
 celecoxib (CELEBREX) 200 mg capsule 1 Sig: TAKE 1 CAPSULE BY MOUTH TWICE DAILY Class: Normal  
 Pharmacy: [facility] Julissa Esparza of [facility] Ph #: [phone]  
 esomeprazole (NEXIUM) 40 mg capsule 3 Sig: TAKE ONE (1) CAPSULE(S) TWICE DAILY Class: Normal  
 [facility] [address] [facility] [place] [address] [place] [address] Ph #: [phone] We Performed the Following METABOLIC PANEL, COMPREHENSIVE [85974 CPT(R)] Introducing [facility]! Dear Zach Ugarte: Thank you for requesting a [employer] account. Our records indicate that you already have an active [employer] account. You can access your account anytime at [email or web address] Did you know that you can access your hospital and ER discharge instructions at any time in [employer]? You can also review all of your test results from your hospital stay or ER visit. Additional Information If you have questions, please visit the Frequently Asked Questions section of the [employer] website at [email or web address] Remember, [employer] is NOT to be used for urgent needs. For medical emergencies, dial 911. Now available from your iPhone and Android! Please provide this summary of care documentation to your next provider. Your primary care clinician is listed as [address] [address], [facility] . If you have any questions after today's visit, please call [phone].

## 2018-01-15 NOTE — PROGRESS NOTES
HISTORY OF PRESENT ILLNESS  HPI  Elieser Richardson is a 65 y.o. female with history of migraines and GERD who presents to office today for fasting follow-up. Pt is currently taking metoprolol for migraine prevention, not HTN. She states she has been on the metoprolol for around 20 years. She denies any history of HTN or arrhythmia. Pt currently takes tramadol 1-2 times a day prn aching, usually in the AM and PM if BID and just PM if once a day. She states she has been taking the tramadol for 6-7 years. She notes that her aching is mostly in her hands, knees, wrists, and shoulders. She also notes intermittent swelling in these areas, which she states resolves after 3-7 days. She denies any joint or muscle problems besides fibromyalgia. 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/surgery and radiation    Fibromyalgia 2010    GERD (gastroesophageal reflux disease) 2010    History of migraine headaches 2017    Migraine 2010    Nausea & vomiting      Past Surgical History:   Procedure Laterality Date    COLONOSCOPY N/A 2016    COLONOSCOPY performed by Andre Mckeon MD at [address] [facility]       x2    HX APPENDECTOMY      HX MASTECTOMY  2008    right,then reconstruction/implant     Current Outpatient Prescriptions on File Prior to Visit   Medication Sig Dispense Refill    SUMAtriptan (IMITREX) 50 mg tablet Take 1 Tab by mouth once as needed for Migraine for up to 1 dose. May repeat at 2 hrs 10 Tab 5    cholecalciferol, vitamin D3, (VITAMIN D3) 2,000 unit tab Take  by mouth.  aspirin delayed-release 81 mg tablet Take 1 Tab by mouth daily.  cyanocobalamin (VITAMIN B-12) 1,000 mcg tablet Take 1,000 mcg by mouth daily.       cyclosporine (RESTASIS) 0.05 % ophthalmic emulsion Administer 1 Drop to both eyes two (2) times a day. No current facility-administered medications on file prior to visit. Allergies   Allergen Reactions    Aspirin Nausea and Vomiting    Betadine [Povidone-Iodine] Rash    Nsaids (Non-Steroidal Anti-Inflammatory Drug) Nausea and Vomiting    Other Medication Nausea and Vomiting     General anas. .. - Pt does not know what this is. Family History   Problem Relation Age of Onset    Coronary Artery Disease Mother     Hypertension Mother     Heart Disease Mother     Diabetes Mother     Pulmonary Embolism Mother     Coronary Artery Disease Father     Heart Disease Father     Hypertension Father     Kidney Disease Father     Thyroid Disease Father      Social History     Social History    Marital status:      Spouse name: N/A    Number of children: N/A    Years of education: N/A     Social History Main Topics    Smoking status: Never Smoker    Smokeless tobacco: Never Used    Alcohol use No    Drug use: No    Sexual activity: Not Currently     Other Topics Concern    None     Social History Narrative             Review of Systems   Constitutional: Negative for chills, diaphoresis, fever, malaise/fatigue and weight loss. Eyes: Negative for blurred vision, double vision, pain and redness. Respiratory: Negative for cough, shortness of breath and wheezing. Cardiovascular: Negative for chest pain, palpitations, orthopnea, claudication, leg swelling and PND. Musculoskeletal: Positive for joint pain (hands, wrists, shoulders, knees). Skin: Negative for itching and rash. Neurological: Negative for dizziness, tingling, tremors, sensory change, speech change, focal weakness, seizures, loss of consciousness, weakness and headaches.      Results for orders placed or performed in visit on 28/61/14   METABOLIC PANEL, COMPREHENSIVE   Result Value Ref Range    Glucose 96 65 - 99 mg/dL    BUN 20 8 - 27 mg/dL    Creatinine 0.69 0.57 - 1.00 mg/dL    GFR est non-AA 94 >59 mL/min/1.73    GFR est  >59 mL/min/1.73    BUN/Creatinine ratio 29 (H) 12 - 28    Sodium 144 134 - 144 mmol/L    Potassium 4.3 3.5 - 5.2 mmol/L    Chloride 104 96 - 106 mmol/L    CO2 23 18 - 29 mmol/L    Calcium 9.2 8.7 - 10.3 mg/dL    Protein, total 6.7 6.0 - 8.5 g/dL    Albumin 4.5 3.6 - 4.8 g/dL    GLOBULIN, TOTAL 2.2 1.5 - 4.5 g/dL    A-G Ratio 2.0 1.2 - 2.2    Bilirubin, total 0.6 0.0 - 1.2 mg/dL    Alk. phosphatase 158 (H) 39 - 117 IU/L    AST (SGOT) 15 0 - 40 IU/L    ALT (SGPT) 15 0 - 32 IU/L             Physical Exam  Visit Vitals    /78 (BP 1 Location: Left arm, BP Patient Position: Sitting)    Pulse 67    Temp 98 °F (36.7 °C) (Oral)    Resp 18    Ht 5' 2\" (1.575 m)    Wt 166 lb 3.2 oz (75.4 kg)    SpO2 95%    BMI 30.4 kg/m2     Head: Normocephalic, without obvious abnormality, atraumatic  Eyes: conjunctivae/corneas clear. PERRL, EOM's intact. Neck: supple, symmetrical, trachea midline, no adenopathy, thyroid: not enlarged, symmetric, no tenderness/mass/nodules, no carotid bruit and no JVD  Lungs: clear to auscultation bilaterally  Heart: regular rate and rhythm, S1, S2 normal, no murmur, click, rub or gallop  Extremities: extremities normal, atraumatic, no cyanosis or edema  Pulses: 2+ and symmetric  Lymph nodes: Cervical, supraclavicular, and axillary nodes normal.  Neurologic: Grossly normal            ASSESSMENT and PLAN    ICD-10-CM ICD-9-CM    1. Fibromyalgia M11.0 774.8 METABOLIC PANEL, COMPREHENSIVE      metoprolol tartrate (LOPRESSOR) 50 mg tablet      traMADol (ULTRAM) 50 mg tablet   2. Gastroesophageal reflux disease with esophagitis X66.0 282.86 METABOLIC PANEL, COMPREHENSIVE      esomeprazole (NEXIUM) 40 mg capsule   3. Arthritis M19.90 716.90 metoprolol tartrate (LOPRESSOR) 50 mg tablet      celecoxib (CELEBREX) 200 mg capsule   4. History of migraine headaches J35.20 O69.12 METABOLIC PANEL, COMPREHENSIVE   5.  Osteopenia determined by x-ray M85.80 733.90      Fidel     Diagnoses and all orders for this visit:    1. Fibromyalgia  -     METABOLIC PANEL, COMPREHENSIVE  -     metoprolol tartrate (LOPRESSOR) 50 mg tablet; TAKE 1 TABLET BY MOUTH TWICE DAILY  -     traMADol (ULTRAM) 50 mg tablet; TAKE 1 TABLET BY MOUTH EVERY 6 HOURS AS NEEDED FOR PAIN    2. Gastroesophageal reflux disease with esophagitis  -     METABOLIC PANEL, COMPREHENSIVE  -     esomeprazole (NEXIUM) 40 mg capsule; TAKE ONE (1) CAPSULE(S) TWICE DAILY    3. Arthritis  -     metoprolol tartrate (LOPRESSOR) 50 mg tablet; TAKE 1 TABLET BY MOUTH TWICE DAILY  -     celecoxib (CELEBREX) 200 mg capsule; TAKE 1 CAPSULE BY MOUTH TWICE DAILY    4. History of migraine headaches  -     METABOLIC PANEL, COMPREHENSIVE    5. Osteopenia determined by x-ray  Comments:  Dr. Aria Hoarn      Follow-up Disposition:  Return in about 6 months (around 7/15/2018), or if symptoms worsen or fail to improve, for F/U migraines, fibromyalgia.     lab results and schedule of future lab studies reviewed with patient  reviewed diet, exercise and weight control  cardiovascular risk and specific lipid/LDL goals reviewed  reviewed medications and side effects in detail  Please call my office if there are any questions- [phone]. I will arrange for follow up after the lab tests done from today return  Recommended a weekly \"heart check. \" I went into detail how to do this. Call for refills on medications as needed. Discussed expected course/resolution/complications of diagnosis in detail with patient. Medication risks/benefits/costs/interactions/alternatives discussed with patient. Pt was given an after visit summary which includes diagnoses, current medications & vitals. Pt expressed understanding with the diagnosis and plan    I talked to pt about the risk of her various medications and the importance of following up every 6 months. I encouraged her to work on her weight, which is creeping up, and suggested a weekly \"heart check\". I made her aware that she can take tylenol with tramadol and also with her Celebrex; there's no potential interaction between them at all. Her bone density back in 2016 showed some osteopenia, but it's just a very mild amount. This is followed by her gynecologist; she sees the nurse practitioner of Dr. Harriett Goldmann. She does take vitamin D 2000 units daily as a preventative; it's never been low, just borderline. She doesn't take extra calcium because it made her constipated, but she consumes calcium-containing foods. This document was written by Blank Mcdaniel, as dictated by Malissa Mendez MD.  I have reviewed and agree with the above note and have made corrections where appropriate Wes Pop M.D.

## 2018-01-16 LAB
ALBUMIN SERPL-MCNC: 4.5 G/DL (ref 3.6–4.8)
ALBUMIN/GLOB SERPL: 2 {RATIO} (ref 1.2–2.2)
ALP SERPL-CCNC: 158 IU/L (ref 39–117)
ALT SERPL-CCNC: 15 IU/L (ref 0–32)
AST SERPL-CCNC: 15 IU/L (ref 0–40)
BILIRUB SERPL-MCNC: 0.6 MG/DL (ref 0–1.2)
BUN SERPL-MCNC: 20 MG/DL (ref 8–27)
BUN/CREAT SERPL: 29 (ref 12–28)
CALCIUM SERPL-MCNC: 9.2 MG/DL (ref 8.7–10.3)
CHLORIDE SERPL-SCNC: 104 MMOL/L (ref 96–106)
CO2 SERPL-SCNC: 23 MMOL/L (ref 18–29)
CREAT SERPL-MCNC: 0.69 MG/DL (ref 0.57–1)
GLOBULIN SER CALC-MCNC: 2.2 G/DL (ref 1.5–4.5)
GLUCOSE SERPL-MCNC: 96 MG/DL (ref 65–99)
POTASSIUM SERPL-SCNC: 4.3 MMOL/L (ref 3.5–5.2)
PROT SERPL-MCNC: 6.7 G/DL (ref 6–8.5)
SODIUM SERPL-SCNC: 144 MMOL/L (ref 134–144)

## 2018-04-10 DIAGNOSIS — K21.00 GASTROESOPHAGEAL REFLUX DISEASE WITH ESOPHAGITIS: ICD-10-CM

## 2018-04-10 NOTE — TELEPHONE ENCOUNTER
Pharmacy on file verified   Requested Prescriptions     Pending Prescriptions Disp Refills    esomeprazole (NEXIUM) 40 mg capsule 180 Cap 3     Sig: TAKE ONE (1) CAPSULE(S) TWICE DAILY

## 2018-04-11 RX ORDER — ESOMEPRAZOLE MAGNESIUM 40 MG/1
CAPSULE, DELAYED RELEASE ORAL
Qty: 180 CAP | Refills: 3 | Status: SHIPPED | OUTPATIENT
Start: 2018-04-11 | End: 2019-04-16 | Stop reason: SDUPTHER

## 2018-04-12 DIAGNOSIS — M19.90 ARTHRITIS: ICD-10-CM

## 2018-04-12 DIAGNOSIS — M79.7 FIBROMYALGIA: ICD-10-CM

## 2018-04-12 RX ORDER — METOPROLOL TARTRATE 50 MG/1
TABLET ORAL
Qty: 180 TAB | Refills: 0 | Status: SHIPPED | OUTPATIENT
Start: 2018-04-12 | End: 2018-05-16 | Stop reason: SDUPTHER

## 2018-05-07 ENCOUNTER — TELEPHONE (OUTPATIENT)
Dept: FAMILY MEDICINE CLINIC | Age: 63
End: 2018-05-07

## 2018-05-15 ENCOUNTER — TELEPHONE (OUTPATIENT)
Dept: FAMILY MEDICINE CLINIC | Age: 63
End: 2018-05-15

## 2018-05-15 NOTE — TELEPHONE ENCOUNTER
Patient is calling in regards to being seen by Dr. Bishop Magaña. She states she has a place on her back that is an open sore which has been present for about 2-3 months. She states the sore is open and wont heal. She states she is not in any pain at the sight of the sore but it is is red, and open.     Best call back [phone]

## 2018-05-16 ENCOUNTER — OFFICE VISIT (OUTPATIENT)
Dept: FAMILY MEDICINE CLINIC | Age: 63
End: 2018-05-16

## 2018-05-16 VITALS
BODY MASS INDEX: 31.1 KG/M2 | WEIGHT: 169 LBS | HEIGHT: 62 IN | OXYGEN SATURATION: 98 % | HEART RATE: 62 BPM | TEMPERATURE: 98.6 F | DIASTOLIC BLOOD PRESSURE: 70 MMHG | RESPIRATION RATE: 18 BRPM | SYSTOLIC BLOOD PRESSURE: 120 MMHG

## 2018-05-16 DIAGNOSIS — R21 RASH/SKIN ERUPTION: Primary | ICD-10-CM

## 2018-05-16 NOTE — PROGRESS NOTES
HISTORY OF PRESENT ILLNESS  HPI  David Brennan is a 65 y.o. Female with a history of migraines and GERD, who presents at the office today for back wound. Pt states that she walks regularly. Pt has a wound around the middle of her back that has been present since 3 months ago. When pt first saw it, she states that it was the size of a dime and reddish-brown. She notes that it was raised but then seemed to become uncovered and seem as though it was a wound. The area does not bleed or drain. Pt states that the area seems to grow to the size of a quarter and then shrink back down. Pt has used bacitracin with no relief and washes the area daily with soap and water. Past Medical History:   Diagnosis Date    Arthritis 2010    Cancer [facility]) 2008    right breast cancer    Colon polyp     DCIS (ductal carcinoma in situ) of breast 2010    right/surgery and radiation    Fibromyalgia 2010    GERD (gastroesophageal reflux disease) 2010    History of migraine headaches 2017    Migraine 2010    Nausea & vomiting      Past Surgical History:   Procedure Laterality Date    COLONOSCOPY N/A 2016    COLONOSCOPY performed by Pao Washington MD at [facility]       x2    HX APPENDECTOMY      HX MASTECTOMY  2008    right,then reconstruction/implant     Current Outpatient Prescriptions on File Prior to Visit   Medication Sig Dispense Refill    esomeprazole (NEXIUM) 40 mg capsule TAKE ONE (1) CAPSULE(S) TWICE DAILY 180 Cap 3    metoprolol tartrate (LOPRESSOR) 50 mg tablet TAKE 1 TABLET BY MOUTH TWICE DAILY 180 Tab 1    celecoxib (CELEBREX) 200 mg capsule TAKE 1 CAPSULE BY MOUTH TWICE DAILY 180 Cap 1    traMADol (ULTRAM) 50 mg tablet TAKE 1 TABLET BY MOUTH EVERY 6 HOURS AS NEEDED FOR PAIN 90 Tab 5    SUMAtriptan (IMITREX) 50 mg tablet Take 1 Tab by mouth once as needed for Migraine for up to 1 dose.  May repeat at 2 hrs 10 Tab 5    cholecalciferol, vitamin D3, (VITAMIN D3) 2,000 unit tab Take  by mouth.  aspirin delayed-release 81 mg tablet Take 1 Tab by mouth daily.  cyanocobalamin (VITAMIN B-12) 1,000 mcg tablet Take 1,000 mcg by mouth daily.  cyclosporine (RESTASIS) 0.05 % ophthalmic emulsion Administer 1 Drop to both eyes two (2) times a day. No current facility-administered medications on file prior to visit. Allergies   Allergen Reactions    Aspirin Nausea and Vomiting    Betadine [Povidone-Iodine] Rash    Nsaids (Non-Steroidal Anti-Inflammatory Drug) Nausea and Vomiting    Other Medication Nausea and Vomiting     General anas. .. - Pt does not know what this is. Family History   Problem Relation Age of Onset    Coronary Artery Disease Mother     Hypertension Mother     Heart Disease Mother     Diabetes Mother     Pulmonary Embolism Mother     Coronary Artery Disease Father     Heart Disease Father     Hypertension Father     Kidney Disease Father     Thyroid Disease Father      Social History     Social History    Marital status:      Spouse name: N/A    Number of children: N/A    Years of education: N/A     Social History Main Topics    Smoking status: Never Smoker    Smokeless tobacco: Never Used    Alcohol use No    Drug use: No    Sexual activity: Not Currently     Other Topics Concern    None     Social History Narrative             Review of Systems   Constitutional: Negative for chills, diaphoresis, fever, malaise/fatigue and weight loss. Eyes: Negative for blurred vision, double vision, pain and redness. Respiratory: Negative for cough, shortness of breath and wheezing. Cardiovascular: Negative for chest pain, palpitations, orthopnea, claudication, leg swelling and PND. Skin: Negative for itching and rash.         \"Wound\" around mid back   Neurological: Negative for dizziness, tingling, tremors, sensory change, speech change, focal weakness, seizures, loss of consciousness, weakness and headaches. Results for orders placed or performed in visit on 31/42/28   METABOLIC PANEL, COMPREHENSIVE   Result Value Ref Range    Glucose 96 65 - 99 mg/dL    BUN 20 8 - 27 mg/dL    Creatinine 0.69 0.57 - 1.00 mg/dL    GFR est non-AA 94 >59 mL/min/1.73    GFR est  >59 mL/min/1.73    BUN/Creatinine ratio 29 (H) 12 - 28    Sodium 144 134 - 144 mmol/L    Potassium 4.3 3.5 - 5.2 mmol/L    Chloride 104 96 - 106 mmol/L    CO2 23 18 - 29 mmol/L    Calcium 9.2 8.7 - 10.3 mg/dL    Protein, total 6.7 6.0 - 8.5 g/dL    Albumin 4.5 3.6 - 4.8 g/dL    GLOBULIN, TOTAL 2.2 1.5 - 4.5 g/dL    A-G Ratio 2.0 1.2 - 2.2    Bilirubin, total 0.6 0.0 - 1.2 mg/dL    Alk. phosphatase 158 (H) 39 - 117 IU/L    AST (SGOT) 15 0 - 40 IU/L    ALT (SGPT) 15 0 - 32 IU/L           Physical Exam  Visit Vitals    /70    Pulse 62    Temp 98.6 °F (37 °C)    Resp 18    Ht 5' 2\" (1.575 m)    Wt 169 lb (76.7 kg)    SpO2 98%    BMI 30.91 kg/m2     Head: Normocephalic, without obvious abnormality, atraumatic  Eyes: conjunctivae/corneas clear. PERRL, EOM's intact. Neck: supple, symmetrical, trachea midline, no adenopathy, thyroid: not enlarged, symmetric, no tenderness/mass/nodules, no carotid bruit and no JVD  Lungs: clear to auscultation bilaterally  Heart: regular rate and rhythm, S1, S2 normal, no murmur, click, rub or gallop  Extremities: extremities normal, atraumatic, no cyanosis or edema  Skin: Skin color, texture, turgor normal. No rashes or lesions. In her left mid-back area, just below the bra line, there is a 1 cm in diameter that looks like a wound. One edge has a little bit of a hemorrhaged look to it, and the center almost looks like it has fatty tissue in it. Is not indurated or tender. The dry band-aid taken off of it had a small amount of blood-like material on it.    Pulses: 2+ and symmetric  Lymph nodes: Cervical, supraclavicular, and axillary nodes normal.  Neurologic: Grossly normal      ASSESSMENT and PLAN    ICD-10-CM ICD-9-CM    1. Rash/skin eruption R21 782.1      Diagnoses and all orders for this visit:    1. Rash/skin eruption    Follow-up Disposition: Not on File       reviewed medications and side effects in detail  Please call my office if there are any questions- [phone]. Discussed expected course/resolution/complications of diagnosis in detail with patient. Medication risks/benefits/costs/interactions/alternatives discussed with patient. Pt was given an after visit summary which includes diagnoses, current medications & vitals. Pt expressed understanding with the diagnosis and plan. Patient to call if no better in 3 -4 days and prn new problems. Filled out form that cleared her for her exercise program through insurance or work. No restrictions     For the atypical skin abnormality listed above, sent her to dermatology for evaluation and likely biopsy of the area. Told her that there are 3 major types of skin cancer and this does not look like those( although SCC can look atypical, neve have seen it look like this.). Should not put anything on this besides a dry band-aid until she sees the dermatologist.     Reviewed the 3 major types of skin cancer and how to differentiate them. Importance of sun block to prevent skin cancer also discussed. This document was written by Neda Lyn, as dictated by Venita Salgado MD.  I have reviewed and agree with the above note and have made corrections where appropriate Wes Bartlett M.D.

## 2018-05-16 NOTE — PROGRESS NOTES
Chief Complaint   Patient presents with    Other     open area on back for 3 month doesn't drain      1. Have you been to the ER, urgent care clinic since your last visit? Hospitalized since your last visit? No    2. Have you seen or consulted any other health care providers outside of the [facility] since your last visit? Include any pap smears or colon screening.  No

## 2018-07-19 ENCOUNTER — OFFICE VISIT (OUTPATIENT)
Dept: FAMILY MEDICINE CLINIC | Age: 63
End: 2018-07-19

## 2018-07-19 VITALS
HEART RATE: 71 BPM | HEIGHT: 62 IN | SYSTOLIC BLOOD PRESSURE: 110 MMHG | TEMPERATURE: 98.1 F | OXYGEN SATURATION: 97 % | DIASTOLIC BLOOD PRESSURE: 72 MMHG | BODY MASS INDEX: 30.36 KG/M2 | WEIGHT: 165 LBS | RESPIRATION RATE: 16 BRPM

## 2018-07-19 DIAGNOSIS — M79.7 FIBROMYALGIA: ICD-10-CM

## 2018-07-19 DIAGNOSIS — E78.5 HYPERLIPIDEMIA LDL GOAL <130: Primary | ICD-10-CM

## 2018-07-19 DIAGNOSIS — I10 HYPERTENSION GOAL BP (BLOOD PRESSURE) < 130/80: ICD-10-CM

## 2018-07-19 DIAGNOSIS — Z86.69 HISTORY OF MIGRAINE: ICD-10-CM

## 2018-07-19 DIAGNOSIS — D05.11 DUCTAL CARCINOMA IN SITU (DCIS) OF RIGHT BREAST: ICD-10-CM

## 2018-07-19 DIAGNOSIS — K21.9 GASTROESOPHAGEAL REFLUX DISEASE WITHOUT ESOPHAGITIS: ICD-10-CM

## 2018-07-19 DIAGNOSIS — M19.90 ARTHRITIS: ICD-10-CM

## 2018-07-19 NOTE — PROGRESS NOTES
Chief Complaint   Patient presents with    Hypertension    Fibromyalgia     \"REVIEWED RECORD IN PREPARATION FOR VISIT AND HAVE OBTAINED THE NECESSARY DOCUMENTATION\"  1. Have you been to the ER, urgent care clinic since your last visit? Hospitalized since your last visit? No    2. Have you seen or consulted any other health care providers outside of the [address] [place] [address] since your last visit? Include any pap smears or colon screening. No  Patient does not have advanced directives.

## 2018-07-19 NOTE — MR AVS SNAPSHOT
Jeanharman Liu 
 
 
 [address] [facility] 
[phone] Patient: Yenni Seymour MRN: [id number] Visit Information Date & Time Provider Department Dept. Phone Encounter #  
 7/19/2018  8:00 AM Justin Warren  [place] [phone] [id number] Your Appointments 1/28/2019  8:15 AM  
ROUTINE CARE with Justin Warren MD  
[facility]) Appt Note: fibromyalgia/HTN 6mo follow up  
 [address] [address]  
[phone]  
  
   
 [address] [address] Upcoming Health Maintenance Date Due DTaP/Tdap/Td series (1 - Tdap) 9/23/1977 ZOSTER VACCINE AGE 60> 7/23/2016 Pneumococcal 19-64 Highest Risk (1 of 3 - PCV13) 7/19/2022* Influenza Age 5 to Adult 8/1/2018 PAP AKA CERVICAL CYTOLOGY 5/1/2019 COLONOSCOPY 8/22/2019 BREAST CANCER SCRN MAMMOGRAM 3/12/2020 *Topic was postponed. The date shown is not the original due date. Allergies as of 7/19/2018  Review Complete On: 7/19/2018 By: Justin Warren MD  
  
 Severity Noted Reaction Type Reactions Aspirin  09/12/2014    Nausea and Vomiting Betadine [Povidone-iodine]  12/20/2010    Rash  
 Nsaids (Non-steroidal Anti-inflammatory Drug)  12/20/2010    Nausea and Vomiting Other Medication  12/20/2010    Nausea and Vomiting General anas. .. - Pt does not know what this is. Current Immunizations  Never Reviewed No immunizations on file. Not reviewed this visit You Were Diagnosed With   
  
 Codes Comments Hyperlipidemia LDL goal <130    -  Primary ICD-10-CM: E78.5 ICD-9-CM: 272.4 Hypertension goal BP (blood pressure) < 130/80     ICD-10-CM: I10 
ICD-9-CM: 401.9 Ductal carcinoma in situ (DCIS) of right breast     ICD-10-CM: D05.11 ICD-9-CM: 233.0 2008 Gastroesophageal reflux disease without esophagitis     ICD-10-CM: K21.9 ICD-9-CM: 530.81 Fibromyalgia     ICD-10-CM: M79.7 ICD-9-CM: 729.1 Arthritis     ICD-10-CM: M19.90 ICD-9-CM: 716.90 History of migraine     ICD-10-CM: Z86.69 
ICD-9-CM: V12.49 Vitals BP Pulse Temp Resp Height(growth percentile) Weight(growth percentile) 110/72 (BP 1 Location: Left arm, BP Patient Position: Sitting) 71 98.1 °F (36.7 °C) (Oral) 16 5' 2\" (1.575 m) 165 lb (74.8 kg) SpO2 BMI OB Status Smoking Status 97% 30.18 kg/m2 Postmenopausal Never Smoker BMI and BSA Data Body Mass Index Body Surface Area  
 30.18 kg/m 2 1.81 m 2 Preferred Pharmacy Pharmacy Name Phone [facility] [address] [place] [address] [phone] Your Updated Medication List  
  
   
This list is accurate as of 7/19/18  9:30 AM.  Always use your most recent med list.  
  
  
  
  
 aspirin delayed-release 81 mg tablet Take 1 Tab by mouth daily. celecoxib 200 mg capsule Commonly known as:  CELEBREX  
TAKE 1 CAPSULE BY MOUTH TWICE DAILY  
  
 esomeprazole 40 mg capsule Commonly known as:  NexIUM  
TAKE ONE (1) CAPSULE(S) TWICE DAILY  
  
 metoprolol tartrate 50 mg tablet Commonly known as:  LOPRESSOR  
TAKE 1 TABLET BY MOUTH TWICE DAILY RESTASIS 0.05 % ophthalmic emulsion Generic drug:  cycloSPORINE Administer 1 Drop to both eyes two (2) times a day. SUMAtriptan 50 mg tablet Commonly known as:  IMITREX Take 1 Tab by mouth once as needed for Migraine for up to 1 dose. May repeat at 2 hrs  
  
 traMADol 50 mg tablet Commonly known as:  ULTRAM  
TAKE 1 TABLET BY MOUTH EVERY 6 HOURS AS NEEDED FOR PAIN  
  
 VITAMIN B-12 1,000 mcg tablet Generic drug:  cyanocobalamin Take 1,000 mcg by mouth daily. VITAMIN D3 2,000 unit Tab Generic drug:  cholecalciferol (vitamin D3) Take  by mouth. We Performed the Following LIPID PANEL [06233 CPT(R)] METABOLIC PANEL, COMPREHENSIVE [12931 CPT(R)] Introducing [facility]! Dear Nona Braswell: Thank you for requesting a [facility] account. Our records indicate that you already have an active [facility] account. You can access your account anytime at [email or web address] Did you know that you can access your hospital and ER discharge instructions at any time in [facility]? You can also review all of your test results from your hospital stay or ER visit. Additional Information If you have questions, please visit the Frequently Asked Questions section of the [facility] website at [email or web address] Remember, [facility] is NOT to be used for urgent needs. For medical emergencies, dial 911. Now available from your iPhone and Android! Please provide this summary of care documentation to your next provider. Your primary care clinician is listed as [place] [address], [facility]/s . If you have any questions after today's visit, please call [phone].

## 2018-07-19 NOTE — PROGRESS NOTES
HISTORY OF PRESENT ILLNESS  HPI  Rahat Townsend is a 65 y.o. Female with a history of migraines and GERD, who presents at the office today for a follow up. Pt does check her BP out of office and notes that her readings average 120/60. Pt's father passed away at 80, her mother is currently 80. Both of pt's parents have or had diabetes, and pt notes that her mother's family has/had diabetes as well. Past Medical History:   Diagnosis Date    Arthritis 2010    Cancer [facility]) 2008    right breast cancer    Colon polyp     DCIS (ductal carcinoma in situ) of breast 2010    right/surgery and radiation    Fibromyalgia 2010    GERD (gastroesophageal reflux disease) 2010    History of migraine 2018    History of migraine headaches 2017    Migraine 2010    Nausea & vomiting      Past Surgical History:   Procedure Laterality Date    COLONOSCOPY N/A 2016    COLON[facility]COPY performed by Serg Harper MD at [facility]       x2    HX APPENDECTOMY      HX MASTECTOMY      right,then reconstruction/implant     Current Outpatient Prescriptions on File Prior to Visit   Medication Sig Dispense Refill    esomeprazole (NEXIUM) 40 mg capsule TAKE ONE (1) CAPSULE(S) TWICE DAILY 180 Cap 3    metoprolol tartrate (LOPRESSOR) 50 mg tablet TAKE 1 TABLET BY MOUTH TWICE DAILY 180 Tab 1    celecoxib (CELEBREX) 200 mg capsule TAKE 1 CAPSULE BY MOUTH TWICE DAILY 180 Cap 1    traMADol (ULTRAM) 50 mg tablet TAKE 1 TABLET BY MOUTH EVERY 6 HOURS AS NEEDED FOR PAIN 90 Tab 5    SUMAtriptan (IMITREX) 50 mg tablet Take 1 Tab by mouth once as needed for Migraine for up to 1 dose. May repeat at 2 hrs 10 Tab 5    cholecalciferol, vitamin D3, (VITAMIN D3) 2,000 unit tab Take  by mouth.  aspirin delayed-release 81 mg tablet Take 1 Tab by mouth daily.  cyanocobalamin (VITAMIN B-12) 1,000 mcg tablet Take 1,000 mcg by mouth daily.       cyclosporine (RESTASIS) 0.05 % ophthalmic emulsion Administer 1 Drop to both eyes two (2) times a day. No current facility-administered medications on file prior to visit. Allergies   Allergen Reactions    Aspirin Nausea and Vomiting    Betadine [Povidone-Iodine] Rash    Nsaids (Non-Steroidal Anti-Inflammatory Drug) Nausea and Vomiting    Other Medication Nausea and Vomiting     General anas. .. - Pt does not know what this is. Family History   Problem Relation Age of Onset    Coronary Artery Disease Mother     Hypertension Mother     Heart Disease Mother     Diabetes Mother     Pulmonary Embolism Mother     Coronary Artery Disease Father     Heart Disease Father     Hypertension Father     Kidney Disease Father     Thyroid Disease Father     Stroke Maternal Grandmother 76    Diabetes Maternal Grandfather 80    COPD Maternal Grandfather     Hypertension Paternal Grandmother 80    Coronary Artery Disease Paternal Grandfather 80    Hypertension Paternal Grandfather      Social History     Social History    Marital status:      Spouse name: N/A    Number of children: N/A    Years of education: N/A     Social History Main Topics    Smoking status: Never Smoker    Smokeless tobacco: Never Used    Alcohol use No    Drug use: No    Sexual activity: Not Currently     Other Topics Concern    None     Social History Narrative             Review of Systems   Constitutional: Negative for chills, diaphoresis, fever, malaise/fatigue and weight loss. Eyes: Negative for blurred vision, double vision, pain and redness. Respiratory: Negative for cough, shortness of breath and wheezing. Cardiovascular: Negative for chest pain, palpitations, orthopnea, claudication, leg swelling and PND. Skin: Negative for itching and rash. Neurological: Negative for dizziness, tingling, tremors, sensory change, speech change, focal weakness, seizures, loss of consciousness, weakness and headaches. Results for orders placed or performed in visit on 07/19/18   LIPID PANEL   Result Value Ref Range    Cholesterol, total 169 100 - 199 mg/dL    Triglyceride 113 0 - 149 mg/dL    HDL Cholesterol 52 >39 mg/dL    VLDL, calculated 23 5 - 40 mg/dL    LDL, calculated 94 0 - 99 mg/dL   METABOLIC PANEL, COMPREHENSIVE   Result Value Ref Range    Glucose 97 65 - 99 mg/dL    BUN 17 8 - 27 mg/dL    Creatinine 0.73 0.57 - 1.00 mg/dL    GFR est non-AA 89 >59 mL/min/1.73    GFR est  >59 mL/min/1.73    BUN/Creatinine ratio 23 12 - 28    Sodium 142 134 - 144 mmol/L    Potassium 4.8 3.5 - 5.2 mmol/L    Chloride 104 96 - 106 mmol/L    CO2 24 20 - 29 mmol/L    Calcium 9.4 8.7 - 10.3 mg/dL    Protein, total 6.3 6.0 - 8.5 g/dL    Albumin 4.2 3.6 - 4.8 g/dL    GLOBULIN, TOTAL 2.1 1.5 - 4.5 g/dL    A-G Ratio 2.0 1.2 - 2.2    Bilirubin, total 0.5 0.0 - 1.2 mg/dL    Alk. phosphatase 166 (H) 39 - 117 IU/L    AST (SGOT) 16 0 - 40 IU/L    ALT (SGPT) 24 0 - 32 IU/L   CVD REPORT   Result Value Ref Range    INTERPRETATION Note          Physical Exam  Visit Vitals    /72 (BP 1 Location: Left arm, BP Patient Position: Sitting)    Pulse 71    Temp 98.1 °F (36.7 °C) (Oral)    Resp 16    Ht 5' 2\" (1.575 m)    Wt 165 lb (74.8 kg)    SpO2 97%    BMI 30.18 kg/m2       Head: Normocephalic, without obvious abnormality, atraumatic  Eyes: conjunctivae/corneas clear. PERRL, EOM's intact. Neck: supple, symmetrical, trachea midline, no adenopathy, thyroid: not enlarged, symmetric, no tenderness/mass/nodules, no carotid bruit and no JVD  Lungs: clear to auscultation bilaterally  Heart: regular rate and rhythm, S1, S2 normal, no murmur, click, rub or gallop  Extremities: extremities normal, atraumatic, no cyanosis or edema  Pulses: 2+ and symmetric  Lymph nodes: Cervical, supraclavicular, and axillary nodes normal.  Neurologic: Grossly normal      ASSESSMENT and PLAN    ICD-10-CM ICD-9-CM    1.  Hyperlipidemia LDL goal <130 E78.5 272.4 LIPID PANEL   2. Hypertension goal BP (blood pressure) < 130/80 X58 971.0 METABOLIC PANEL, COMPREHENSIVE   3. Ductal carcinoma in situ (DCIS) of right breast D05.11 233.0     2008     4. Gastroesophageal reflux disease without esophagitis K21.9 530.81    5. Fibromyalgia M79.7 729.1    6. Arthritis M19.90 716.90    7. History of migraine Z86.69 V12.49      Diagnoses and all orders for this visit:    1. Hyperlipidemia LDL goal <130  -     LIPID PANEL    2. Hypertension goal BP (blood pressure) < 938/39  -     METABOLIC PANEL, COMPREHENSIVE    3. Ductal carcinoma in situ (DCIS) of right breast  Comments:  2008      4. Gastroesophageal reflux disease without esophagitis    5. Fibromyalgia    6. Arthritis    7. History of migraine    Other orders  -     CVD REPORT      Follow-up Disposition:  Return in about 6 months (around 1/19/2019) for F/U hypertension, F/U of obesity, fibromyalgia.       lab results and schedule of future lab studies reviewed with patient  reviewed diet, exercise and weight control  cardiovascular risk and specific lipid/LDL goals reviewed  reviewed medications and side effects in detail  Please call my office if there are any questions- [phone]. I will arrange for follow up after the lab tests done from today return  Recommended a weekly \"heart check. \" I went into detail how to do this. Call for refills on medications as needed. Discussed expected course/resolution/complications of diagnosis in detail with patient. Medication risks/benefits/costs/interactions/alternatives discussed with patient. Pt was given an after visit summary which includes diagnoses, current medications & vitals. Pt expressed understanding with the diagnosis and plan. Total 25 minutes,60 % counseling re: Reviewed symptoms, or lack thereof, of hypertension and elevated cholesterol.      Reviewed in detail the proper technique of checking the blood pressure- check it on an average day only, not on a stressful day, sitting, no exercise for at least 1 hour and not experiencing any new pain( chronic pain is OK). Patient encouraged to check BP sitting and standing at least once a month and to report these readings to me if > 130/ 80 on average , or if the standing BP is >  15 points lower than the sitting. The American Heart Association Heart Risk Calculation( a new way to calculate your risk of a stroke or heart attack in the future) shows your risk of a heart attack or stroke in the next 10 years; a statin is recommended if that # is > 7.5 %. Weight loss, diet, and exercise lower it enough to avoid medication for a few years only. This new formula has men at 72 and women at 66-77 on average requiring a statin to reduce the age related risk of having a stroke or heart attack, even with no other health problems, including normal cholesterol numbers. I will calculate your risk after your cholesterol numbers return. It has become clear from recent studies, that your risk of death from a stroke or heart attack is reduced significantly with a group of medications called statins which in the past have been known as \"cholesterol medications. \"     Discussed the CRC with pt. Recommended checking her BP sitting and standing. Also, discussed symptoms of concern that were noted today in the note above, treatment options( including doing nothing), when to follow up before recommended time frame. Also, answered all questions. This document was written by Rudy Rees, as dictated by Konstantin Escobedo MD.  I have reviewed and agree with the above note and have made corrections where appropriate Wes Carpenter M.D.

## 2018-07-20 LAB
ALBUMIN SERPL-MCNC: 4.2 G/DL (ref 3.6–4.8)
ALBUMIN/GLOB SERPL: 2 {RATIO} (ref 1.2–2.2)
ALP SERPL-CCNC: 166 IU/L (ref 39–117)
ALT SERPL-CCNC: 24 IU/L (ref 0–32)
AST SERPL-CCNC: 16 IU/L (ref 0–40)
BILIRUB SERPL-MCNC: 0.5 MG/DL (ref 0–1.2)
BUN SERPL-MCNC: 17 MG/DL (ref 8–27)
BUN/CREAT SERPL: 23 (ref 12–28)
CALCIUM SERPL-MCNC: 9.4 MG/DL (ref 8.7–10.3)
CHLORIDE SERPL-SCNC: 104 MMOL/L (ref 96–106)
CHOLEST SERPL-MCNC: 169 MG/DL (ref 100–199)
CO2 SERPL-SCNC: 24 MMOL/L (ref 20–29)
CREAT SERPL-MCNC: 0.73 MG/DL (ref 0.57–1)
GLOBULIN SER CALC-MCNC: 2.1 G/DL (ref 1.5–4.5)
GLUCOSE SERPL-MCNC: 97 MG/DL (ref 65–99)
HDLC SERPL-MCNC: 52 MG/DL
INTERPRETATION, 910389: NORMAL
LDLC SERPL CALC-MCNC: 94 MG/DL (ref 0–99)
POTASSIUM SERPL-SCNC: 4.8 MMOL/L (ref 3.5–5.2)
PROT SERPL-MCNC: 6.3 G/DL (ref 6–8.5)
SODIUM SERPL-SCNC: 142 MMOL/L (ref 134–144)
TRIGL SERPL-MCNC: 113 MG/DL (ref 0–149)
VLDLC SERPL CALC-MCNC: 23 MG/DL (ref 5–40)

## 2018-07-21 NOTE — PROGRESS NOTES
GREAT NEWS!! All of your recent lab tests are normal or at goal. No diabetes, normal liver and kidney tests. I would continue everything the same and schedule a physical/ Annual Wellness Visit every year after the age of 61.

## 2018-07-27 DIAGNOSIS — K21.00 GASTROESOPHAGEAL REFLUX DISEASE WITH ESOPHAGITIS: ICD-10-CM

## 2018-07-27 RX ORDER — ESOMEPRAZOLE MAGNESIUM 40 MG/1
CAPSULE, DELAYED RELEASE ORAL
Qty: 180 CAP | Refills: 3 | Status: SHIPPED | OUTPATIENT
Start: 2018-07-27 | End: 2019-02-14 | Stop reason: SDUPTHER

## 2018-08-06 DIAGNOSIS — M79.7 FIBROMYALGIA: ICD-10-CM

## 2018-08-07 RX ORDER — TRAMADOL HYDROCHLORIDE 50 MG/1
TABLET ORAL
Qty: 90 TAB | Refills: 0 | Status: SHIPPED | OUTPATIENT
Start: 2018-08-07 | End: 2018-11-03 | Stop reason: SDUPTHER

## 2018-09-09 DIAGNOSIS — Z86.69 HISTORY OF MIGRAINE HEADACHES: Chronic | ICD-10-CM

## 2018-09-10 RX ORDER — SUMATRIPTAN 50 MG/1
TABLET, FILM COATED ORAL
Qty: 10 TAB | Refills: 5 | Status: SHIPPED | OUTPATIENT
Start: 2018-09-10 | End: 2019-12-07 | Stop reason: SDUPTHER

## 2018-12-07 DIAGNOSIS — M19.90 ARTHRITIS: ICD-10-CM

## 2018-12-07 DIAGNOSIS — M79.7 FIBROMYALGIA: ICD-10-CM

## 2018-12-07 RX ORDER — METOPROLOL TARTRATE 50 MG/1
TABLET ORAL
Qty: 180 TAB | Refills: 0 | Status: SHIPPED | OUTPATIENT
Start: 2018-12-07 | End: 2019-03-05 | Stop reason: SDUPTHER

## 2019-01-28 ENCOUNTER — OFFICE VISIT (OUTPATIENT)
Dept: FAMILY MEDICINE CLINIC | Age: 64
End: 2019-01-28

## 2019-01-28 VITALS
WEIGHT: 167 LBS | OXYGEN SATURATION: 96 % | RESPIRATION RATE: 18 BRPM | DIASTOLIC BLOOD PRESSURE: 68 MMHG | TEMPERATURE: 98.5 F | SYSTOLIC BLOOD PRESSURE: 122 MMHG | HEART RATE: 76 BPM | HEIGHT: 62 IN | BODY MASS INDEX: 30.73 KG/M2

## 2019-01-28 DIAGNOSIS — D05.11 DUCTAL CARCINOMA IN SITU (DCIS) OF RIGHT BREAST: ICD-10-CM

## 2019-01-28 DIAGNOSIS — M85.88 OSTEOPENIA OF SPINE: ICD-10-CM

## 2019-01-28 DIAGNOSIS — K63.5 POLYP OF COLON, UNSPECIFIED PART OF COLON, UNSPECIFIED TYPE: ICD-10-CM

## 2019-01-28 DIAGNOSIS — K21.9 GASTROESOPHAGEAL REFLUX DISEASE WITHOUT ESOPHAGITIS: ICD-10-CM

## 2019-01-28 DIAGNOSIS — Z23 NEED FOR SHINGLES VACCINE: ICD-10-CM

## 2019-01-28 DIAGNOSIS — M79.7 FIBROMYALGIA: ICD-10-CM

## 2019-01-28 DIAGNOSIS — Z86.69 HISTORY OF MIGRAINE HEADACHES: ICD-10-CM

## 2019-01-28 DIAGNOSIS — Z00.00 ROUTINE GENERAL MEDICAL EXAMINATION AT A HEALTH CARE FACILITY: Primary | ICD-10-CM

## 2019-01-28 DIAGNOSIS — M47.816 SPONDYLOSIS OF LUMBAR REGION WITHOUT MYELOPATHY OR RADICULOPATHY: ICD-10-CM

## 2019-01-28 NOTE — PROGRESS NOTES
HISTORY OF PRESENT ILLNESS  ALESSANDRO Ga is a 59 y.o. 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 Barbra Kumar Pt is fasting today. Pt is taking the metoprolol to prevent migraines and NOT FOR BP. Pt does check her BP out of office and reports that her readings average 130/70. Pt denies unusual SOB, chest pain, and any recent ER visits or hospitalizations. Pt is getting her Restasis eye drops from Dr. Marii Mills. Pt admits that she does have a family history of heart disease.            Past Medical History:   Diagnosis Date    Arthritis 2010    Cancer [facility])     right breast cancer    Colon polyp     DCIS (ductal carcinoma in situ) of breast 2010    right/surgery and radiation    Fibromyalgia 2010    GERD (gastroesophageal reflux disease) 2010    History of migraine 2018    History of migraine headaches 2017    Migraine 2010    Nausea & vomiting      Past Surgical History:   Procedure Laterality Date    COLONOSCOPY N/A 2016    COLONOSCOPY performed by Noelle Romero MD at [facility]       x2    HX APPENDECTOMY      HX MASTECTOMY      right,then reconstruction/implant     Current Outpatient Medications on File Prior to Visit   Medication Sig Dispense Refill    metoprolol tartrate (LOPRESSOR) 50 mg tablet TAKE 1 TABLET BY MOUTH TWICE DAILY 180 Tab 0    traMADol (ULTRAM) 50 mg tablet TAKE 1 TABLET BY MOUTH EVERY 6 HOURS AS NEEDED FOR PAIN 90 Tab 0    metoprolol tartrate (LOPRESSOR) 50 mg tablet TAKE 1 TABLET BY MOUTH TWICE DAILY 180 Tab 0    SUMAtriptan (IMITREX) 50 mg tablet TAKE 1 TABLET BY MOUTH ONCE AS NEEDED FOR MIGRAINE FOR UP TO ONE DOSE. MAY REPEAT IN 2 HOURS 10 Tab 5    esomeprazole (NEXIUM) 40 mg capsule TAKE ONE CAPSULE BY MOUTH TWICE DAILY 180 Cap 3    esomeprazole (NEXIUM) 40 mg capsule TAKE ONE (1) CAPSULE(S) TWICE DAILY 180 Cap 3    celecoxib (CELEBREX) 200 mg capsule TAKE 1 CAPSULE BY MOUTH TWICE DAILY 180 Cap 1    cholecalciferol, vitamin D3, (VITAMIN D3) 2,000 unit tab Take  by mouth.  aspirin delayed-release 81 mg tablet Take 1 Tab by mouth daily.  cyanocobalamin (VITAMIN B-12) 1,000 mcg tablet Take 1,000 mcg by mouth daily.  cyclosporine (RESTASIS) 0.05 % ophthalmic emulsion Administer 1 Drop to both eyes two (2) times a day. No current facility-administered medications on file prior to visit. Allergies   Allergen Reactions    Aspirin Nausea and Vomiting    Betadine [Povidone-Iodine] Rash    Nsaids (Non-Steroidal Anti-Inflammatory Drug) Nausea and Vomiting    Other Medication Nausea and Vomiting     General anas. .. - Pt does not know what this is.      Family History   Problem Relation Age of Onset    Coronary Artery Disease Mother     Hypertension Mother     Heart Disease Mother     Diabetes Mother     Pulmonary Embolism Mother     Coronary Artery Disease Father     Heart Disease Father     Hypertension Father     Kidney Disease Father     Thyroid Disease Father     Stroke Maternal Grandmother 76    Diabetes Maternal Grandfather 80    COPD Maternal Grandfather     Hypertension Paternal Grandmother 80    Coronary Artery Disease Paternal Grandfather 80    Hypertension Paternal Grandfather      Social History     Socioeconomic History    Marital status:      Spouse name: Not on file    Number of children: Not on file    Years of education: Not on file    Highest education level: Not on file   Tobacco Use    Smoking status: Never Smoker    Smokeless tobacco: Never Used   Substance and Sexual Activity    Alcohol use: No    Drug use: No    Sexual activity: Not Currently           Review of Systems   Constitutional: Negative for chills, diaphoresis, fever, malaise/fatigue and weight loss. Eyes: Negative for blurred vision, double vision, pain and redness. Respiratory: Negative for cough, shortness of breath and wheezing. Cardiovascular: Negative for chest pain, palpitations, orthopnea, claudication, leg swelling and PND. Skin: Negative for itching and rash. Neurological: Negative for dizziness, tingling, tremors, sensory change, speech change, focal weakness, seizures, loss of consciousness, weakness and headaches. Results for orders placed or performed in visit on 01/28/19   CBC W/O DIFF   Result Value Ref Range    WBC 7.6 3.4 - 10.8 x10E3/uL    RBC 4.56 3.77 - 5.28 x10E6/uL    HGB 14.1 11.1 - 15.9 g/dL    HCT 42.2 34.0 - 46.6 %    MCV 93 79 - 97 fL    MCH 30.9 26.6 - 33.0 pg    MCHC 33.4 31.5 - 35.7 g/dL    RDW 14.6 12.3 - 15.4 %    PLATELET 515 192 - 214 [id number]/CARIDAD   METABOLIC PANEL, COMPREHENSIVE   Result Value Ref Range    Glucose 92 65 - 99 mg/dL    BUN 17 8 - 27 mg/dL    Creatinine 0.71 0.57 - 1.00 mg/dL    GFR est non-AA 92 >59 mL/min/1.73    GFR est  >59 mL/min/1.73    BUN/Creatinine ratio 24 12 - 28    Sodium 142 134 - 144 mmol/L    Potassium 4.4 3.5 - 5.2 mmol/L    Chloride 103 96 - 106 mmol/L    CO2 23 20 - 29 mmol/L    Calcium 9.4 8.7 - 10.3 mg/dL    Protein, total 6.7 6.0 - 8.5 g/dL    Albumin 4.4 3.6 - 4.8 g/dL    GLOBULIN, TOTAL 2.3 1.5 - 4.5 g/dL    A-G Ratio 1.9 1.2 - 2.2    Bilirubin, total 0.6 0.0 - 1.2 mg/dL    Alk.  phosphatase 171 (H) 39 - 117 IU/L    AST (SGOT) 18 0 - 40 IU/L    ALT (SGPT) 22 0 - 32 IU/L   LIPID PANEL   Result Value Ref Range    Cholesterol, total 171 100 - 199 mg/dL    Triglyceride 115 0 - 149 mg/dL    HDL Cholesterol 57 >39 mg/dL    VLDL, calculated 23 5 - 40 mg/dL    LDL, calculated 91 0 - 99 mg/dL   CVD REPORT   Result Value Ref Range    INTERPRETATION Note          Physical Exam  Visit Vitals  /68 (BP 1 Location: Right arm, BP Patient Position: Sitting)   Pulse 76   Temp 98.5 °F (36.9 °C) (Oral)   Resp 18   Ht 5' 2\" (1.575 m)   Wt 167 lb (75.8 kg)   SpO2 96%   BMI 30.54 kg/m²     Head: Normocephalic, without obvious abnormality, atraumatic  Eyes: conjunctivae/corneas clear. PERRL, EOM's intact. Neck: supple, symmetrical, trachea midline, no adenopathy, thyroid: not enlarged, symmetric, no tenderness/mass/nodules, no carotid bruit and no JVD  Lungs: clear to auscultation bilaterally  Heart: regular rate and rhythm, S1, S2 normal, no murmur, click, rub or gallop  Extremities: extremities normal, atraumatic, no cyanosis or edema  Pulses: 2+ and symmetric  Lymph nodes: Cervical, supraclavicular, and axillary nodes normal.  Neurologic: Grossly normal      ASSESSMENT and PLAN    ICD-10-CM ICD-9-CM    1. Routine general medical examination at a health care facility Z00.00 V70.0 CBC W/O DIFF      METABOLIC PANEL, COMPREHENSIVE      LIPID PANEL   2. Need for shingles vaccine Z23 V04.89 varicella-zoster recombinant, PF, (SHINGRIX, PF,) 50 mcg/0.5 mL susr injection   3. Ductal carcinoma in situ (DCIS) of right breast D05.11 233.0    4. History of migraine headaches Z86.69 V12.49    5. Polyp of colon, unspecified part of colon, unspecified type K63.5 211.3    6. Gastroesophageal reflux disease without esophagitis K21.9 530.81    7. Fibromyalgia M79.7 729.1    8. Osteopenia of spine M85.88 733.90     mild on 2016 Dexa   9. Spondylosis of lumbar region without myelopathy or radiculopathy M47.816 721.3      Diagnoses and all orders for this visit:    1. Routine general medical examination at a health care facility  -     CBC W/O DIFF  -     METABOLIC PANEL, COMPREHENSIVE  -     LIPID PANEL    2. Need for shingles vaccine  -     varicella-zoster recombinant, PF, (SHINGRIX, PF,) 50 mcg/0.5 mL susr injection; 0.5 mL by IntraMUSCular route once for 1 dose. 3. Ductal carcinoma in situ (DCIS) of right breast    4. History of migraine headaches    5. Polyp of colon, unspecified part of colon, unspecified type    6.  Gastroesophageal reflux disease without esophagitis    7. Fibromyalgia    8. Osteopenia of spine  Comments:  mild on 2016 Dexa    9. Spondylosis of lumbar region without myelopathy or radiculopathy    Other orders  -     CVD REPORT      Follow-up Disposition:  Return in about 6 months (around 7/28/2019), or if GERD or arthritis symptoms worsen or fail to improve, for migraines, GERD, osteoarthritis. lab results and schedule of future lab studies reviewed with patient  reviewed diet, exercise and weight control  cardiovascular risk and specific lipid/LDL goals reviewed  reviewed medications and side effects in detail  Please call my office if there are any questions- [phone]. I will arrange for follow up after the lab tests done from today return  Recommended a weekly \"heart check. \" I went into detail how to do this. Call for refills on medications as needed. Discussed expected course/resolution/complications of diagnosis in detail with patient. Medication risks/benefits/costs/interactions/alternatives discussed with patient. Pt was given an after visit summary which includes diagnoses, current medications & vitals. Pt expressed understanding with the diagnosis and plan. Total 25 minutes,60 % counseling re: Recommended a weekly \"heart check. \" I went into detail how to do this. Regular exercise is very important to your health; it helps mentally, physically, socially; it prevents injuries if done properly. Exercise, even as simple as walking 20-30 minutes daily has major benefits to your health even though your \"numbers\" are the same in the lab. See if you can add this into your daily regimen and after a few months it will become a regular habit-\"just something you do,\" like brushing your teeth.      A combination of aerobic exercise and strengthening and stretching is felt to be the best for you, so this should be your ultimate goal.   This can be done in the privacy of your home or in a group setting as at the gym Some prefer having a , others prefer to do exercise in groups or individually. Do what \"works\" for you. You need to make it simple and \"fun,\" or you most likely you will not continue it. BMI is significantly elevated- in the obese range. I reviewed diet, exercise and weight control. Discussed weight control in detail, the importance of mainly decreased carbs, and for weight maintenance, exercise; discussed different diets and that it isn't as important to watch the type of foods as it is to decrease calorie intake no matter what type of diet you do, etc.     Discussed CRC, and I encouraged her to continue performing her \"heart check\" on a regular basis. She takes her metoprolol to prevent migraines, not for HTN. She has tried to taper her Nexium, but her GERD symptoms return when she does this. Reviewed foods and liquids that are likely to aggravate reflux- alcohol of any type( worst seems to be red wine), acid foods like citrus and tomato based foods; caffeine(in coffee, many sodas, chocolate, and tea) bere and fried or high fat content foods. Eating food within 4-5 hours of bedtime may also contribute to increased reflux. Also, discussed symptoms of concern that were noted today in the note above, treatment options( including doing nothing), when to follow up before recommended time frame. Also, answered all questions. This document was written by Kevin David, as dictated by Kt Chawla MD.  I have reviewed and agree with the above note and have made corrections where appropriate Wes Barnes M.D.

## 2019-01-28 NOTE — PROGRESS NOTES
Chief Complaint   Patient presents with    Hypertension     follow up       1. Have you been to the ER, urgent care clinic since your last visit? Hospitalized since your last visit? No    2. Have you seen or consulted any other health care providers outside of the [address] [place] [address] since your last visit? Include any pap smears or colon screening.  No

## 2019-01-29 PROBLEM — M47.816 SPONDYLOSIS OF LUMBAR REGION WITHOUT MYELOPATHY OR RADICULOPATHY: Status: ACTIVE | Noted: 2019-01-29

## 2019-01-29 PROBLEM — M47.9 OSTEOARTHRITIS OF SPINE: Status: ACTIVE | Noted: 2019-01-29

## 2019-01-29 PROBLEM — M85.88 OSTEOPENIA OF SPINE: Status: ACTIVE | Noted: 2019-01-29

## 2019-01-29 LAB
ALBUMIN SERPL-MCNC: 4.4 G/DL (ref 3.6–4.8)
ALBUMIN/GLOB SERPL: 1.9 {RATIO} (ref 1.2–2.2)
ALP SERPL-CCNC: 171 IU/L (ref 39–117)
ALT SERPL-CCNC: 22 IU/L (ref 0–32)
AST SERPL-CCNC: 18 IU/L (ref 0–40)
BILIRUB SERPL-MCNC: 0.6 MG/DL (ref 0–1.2)
BUN SERPL-MCNC: 17 MG/DL (ref 8–27)
BUN/CREAT SERPL: 24 (ref 12–28)
CALCIUM SERPL-MCNC: 9.4 MG/DL (ref 8.7–10.3)
CHLORIDE SERPL-SCNC: 103 MMOL/L (ref 96–106)
CHOLEST SERPL-MCNC: 171 MG/DL (ref 100–199)
CO2 SERPL-SCNC: 23 MMOL/L (ref 20–29)
CREAT SERPL-MCNC: 0.71 MG/DL (ref 0.57–1)
ERYTHROCYTE [DISTWIDTH] IN BLOOD BY AUTOMATED COUNT: 14.6 % (ref 12.3–15.4)
GLOBULIN SER CALC-MCNC: 2.3 G/DL (ref 1.5–4.5)
GLUCOSE SERPL-MCNC: 92 MG/DL (ref 65–99)
HCT VFR BLD AUTO: 42.2 % (ref 34–46.6)
HDLC SERPL-MCNC: 57 MG/DL
HGB BLD-MCNC: 14.1 G/DL (ref 11.1–15.9)
INTERPRETATION, 910389: NORMAL
LDLC SERPL CALC-MCNC: 91 MG/DL (ref 0–99)
MCH RBC QN AUTO: 30.9 PG (ref 26.6–33)
MCHC RBC AUTO-ENTMCNC: 33.4 G/DL (ref 31.5–35.7)
MCV RBC AUTO: 93 FL (ref 79–97)
PLATELET # BLD AUTO: 290 X10E3/UL (ref 150–379)
POTASSIUM SERPL-SCNC: 4.4 MMOL/L (ref 3.5–5.2)
PROT SERPL-MCNC: 6.7 G/DL (ref 6–8.5)
RBC # BLD AUTO: 4.56 X10E6/UL (ref 3.77–5.28)
SODIUM SERPL-SCNC: 142 MMOL/L (ref 134–144)
TRIGL SERPL-MCNC: 115 MG/DL (ref 0–149)
VLDLC SERPL CALC-MCNC: 23 MG/DL (ref 5–40)
WBC # BLD AUTO: 7.6 X10E3/UL (ref 3.4–10.8)

## 2019-02-12 DIAGNOSIS — M79.7 FIBROMYALGIA: ICD-10-CM

## 2019-02-13 RX ORDER — TRAMADOL HYDROCHLORIDE 50 MG/1
TABLET ORAL
Qty: 90 TAB | Refills: 0 | Status: SHIPPED | OUTPATIENT
Start: 2019-02-13 | End: 2019-04-05 | Stop reason: SDUPTHER

## 2019-02-14 DIAGNOSIS — K21.00 GASTROESOPHAGEAL REFLUX DISEASE WITH ESOPHAGITIS: ICD-10-CM

## 2019-02-14 NOTE — TELEPHONE ENCOUNTER
Pharmacy requesting medication refill     Requested Prescriptions     Pending Prescriptions Disp Refills    esomeprazole (NEXIUM) 40 mg capsule 180 Cap 3     Pharmacy on file verified  [facility] [phone])     LOV  Monday, January 28, 2019 08:15 AM    Return in about 6 months (around 7/28/2019), or if GERD or arthritis symptoms worsen or fail to improve, for migraines, GERD, osteoarthritis.    NOV Monday, July 29, 2019 08:45 AM

## 2019-02-15 RX ORDER — ESOMEPRAZOLE MAGNESIUM 40 MG/1
CAPSULE, DELAYED RELEASE ORAL
Qty: 180 CAP | Refills: 3 | Status: SHIPPED | OUTPATIENT
Start: 2019-02-15 | End: 2019-11-04

## 2019-04-05 DIAGNOSIS — K21.00 GASTROESOPHAGEAL REFLUX DISEASE WITH ESOPHAGITIS: ICD-10-CM

## 2019-04-05 DIAGNOSIS — M79.7 FIBROMYALGIA: ICD-10-CM

## 2019-04-05 RX ORDER — ESOMEPRAZOLE MAGNESIUM 40 MG/1
CAPSULE, DELAYED RELEASE ORAL
Qty: 60 CAP | Refills: 0 | Status: SHIPPED | OUTPATIENT
Start: 2019-04-05 | End: 2019-11-04

## 2019-04-05 RX ORDER — TRAMADOL HYDROCHLORIDE 50 MG/1
50 TABLET ORAL
Qty: 90 TAB | Refills: 2 | Status: SHIPPED | OUTPATIENT
Start: 2019-04-05 | End: 2019-05-05

## 2019-04-16 DIAGNOSIS — K21.00 GASTROESOPHAGEAL REFLUX DISEASE WITH ESOPHAGITIS: ICD-10-CM

## 2019-04-16 RX ORDER — ESOMEPRAZOLE MAGNESIUM 40 MG/1
CAPSULE, DELAYED RELEASE ORAL
Qty: 180 CAP | Refills: 2 | Status: SHIPPED | OUTPATIENT
Start: 2019-04-16 | End: 2020-01-06 | Stop reason: SDUPTHER

## 2019-06-15 DIAGNOSIS — M19.90 ARTHRITIS: ICD-10-CM

## 2019-06-17 RX ORDER — CELECOXIB 200 MG/1
CAPSULE ORAL
Qty: 180 CAP | Refills: 0 | Status: SHIPPED | OUTPATIENT
Start: 2019-06-17 | End: 2019-09-13 | Stop reason: SDUPTHER

## 2019-06-27 NOTE — PROGRESS NOTES
GREAT NEWS!! All of your recent lab tests are normal or at goal.  No diabetes, normal liver and kidney tests. Great cholesterol numbers. I would continue everything the same and we will see you at your next scheduled follow up in July.

## 2019-07-29 ENCOUNTER — OFFICE VISIT (OUTPATIENT)
Dept: FAMILY MEDICINE CLINIC | Age: 64
End: 2019-07-29

## 2019-07-29 VITALS
TEMPERATURE: 98.5 F | DIASTOLIC BLOOD PRESSURE: 80 MMHG | HEIGHT: 62 IN | HEART RATE: 76 BPM | OXYGEN SATURATION: 97 % | BODY MASS INDEX: 31.65 KG/M2 | WEIGHT: 172 LBS | SYSTOLIC BLOOD PRESSURE: 120 MMHG | RESPIRATION RATE: 18 BRPM

## 2019-07-29 DIAGNOSIS — K21.00 GASTROESOPHAGEAL REFLUX DISEASE WITH ESOPHAGITIS: ICD-10-CM

## 2019-07-29 DIAGNOSIS — R06.09 DOE (DYSPNEA ON EXERTION): ICD-10-CM

## 2019-07-29 DIAGNOSIS — K21.9 GASTROESOPHAGEAL REFLUX DISEASE WITHOUT ESOPHAGITIS: ICD-10-CM

## 2019-07-29 DIAGNOSIS — Z86.69 HISTORY OF MIGRAINE HEADACHES: ICD-10-CM

## 2019-07-29 DIAGNOSIS — M19.90 ARTHRITIS: ICD-10-CM

## 2019-07-29 DIAGNOSIS — E78.5 HYPERLIPIDEMIA LDL GOAL <130: Primary | ICD-10-CM

## 2019-07-29 DIAGNOSIS — D05.11 DUCTAL CARCINOMA IN SITU (DCIS) OF RIGHT BREAST: ICD-10-CM

## 2019-07-29 DIAGNOSIS — M79.7 FIBROMYALGIA: ICD-10-CM

## 2019-07-29 DIAGNOSIS — M85.88 OSTEOPENIA OF SPINE: ICD-10-CM

## 2019-07-29 DIAGNOSIS — Z86.69 HISTORY OF MIGRAINE: ICD-10-CM

## 2019-07-29 DIAGNOSIS — M47.816 SPONDYLOSIS OF LUMBAR REGION WITHOUT MYELOPATHY OR RADICULOPATHY: ICD-10-CM

## 2019-07-29 DIAGNOSIS — R60.0 PEDAL EDEMA: ICD-10-CM

## 2019-07-29 RX ORDER — TRAMADOL HYDROCHLORIDE 50 MG/1
50 TABLET ORAL
Refills: 2 | COMMUNITY
Start: 2019-06-16 | End: 2019-11-05 | Stop reason: SDUPTHER

## 2019-07-29 NOTE — PROGRESS NOTES
Chief Complaint   Patient presents with    GERD     6 month ov     Swelling     swelling in legs     1. Have you been to the ER, urgent care clinic since your last visit? Hospitalized since your last visit? No    2. Have you seen or consulted any other health care providers outside of the [address] [place] [address] since your last visit? Include any pap smears or colon screening.  No

## 2019-07-29 NOTE — PROGRESS NOTES
HISTORY OF PRESENT ILLNESS  HPI  Roverto Kumari is a 59 y.o. Female with a history of migraine, GERD, colon polyp, osteopenia and osteoarthritis of spine, lumbar spondylosis, appendectomy, DCIS of breast and fibromyalgia, who presents to the office today for a follow up on her GERD. Pt complains of swelling in last 3-4 months in bilateral hands and bilateral legs from the knees down. Pt says it bothers her constantly throughout the day and worsens at night. Pt states both legs are equally affected. Pt notes occasional pain associated with the swelling. Pt denies taking any medication for it, but has tried elevating her legs, with no relief. Pt is taking metoprolol for migraines and irregular heartbeats. Pt denies unusual SOB, chest pain, and any recent ER visits or hospitalizations.            Past Medical History:   Diagnosis Date    Arthritis 2010    Cancer [facility]) 2008    right breast cancer    Colon polyp     DCIS (ductal carcinoma in situ) of breast 2010    right/surgery and radiation    Fibromyalgia 2010    GERD (gastroesophageal reflux disease) 2010    History of migraine 2018    History of migraine headaches 2017    Migraine 2010    Nausea & vomiting      Past Surgical History:   Procedure Laterality Date    COLONOSCOPY N/A 2016    COLONOSCOPY performed by Dimitry Pena MD at 89        x2    HX APPENDECTOMY      HX MASTECTOMY      right,then reconstruction/implant     Current Outpatient Medications on File Prior to Visit   Medication Sig Dispense Refill    traMADol (ULTRAM) 50 mg tablet   2    celecoxib (CELEBREX) 200 mg capsule TAKE 1 CAPSULE BY MOUTH TWICE DAILY 180 Cap 0    esomeprazole (NEXIUM) 40 mg capsule TAKE 1 CAPSULE BY MOUTH TWICE DAILY 180 Cap 2    esomeprazole (NEXIUM) 40 mg capsule TAKE 1 CAPSULE BY MOUTH TWICE DAILY 60 Cap 0    esomeprazole (NEXIUM) 40 mg capsule TAKE ONE CAPSULE BY MOUTH TWICE DAILY 180 Cap 3    metoprolol tartrate (LOPRESSOR) 50 mg tablet TAKE 1 TABLET BY MOUTH TWICE DAILY 180 Tab 0    SUMAtriptan (IMITREX) 50 mg tablet TAKE 1 TABLET BY MOUTH ONCE AS NEEDED FOR MIGRAINE FOR UP TO ONE DOSE. MAY REPEAT IN 2 HOURS 10 Tab 5    cholecalciferol, vitamin D3, (VITAMIN D3) 2,000 unit tab Take  by mouth.  aspirin delayed-release 81 mg tablet Take 1 Tab by mouth daily.  cyanocobalamin (VITAMIN B-12) 1,000 mcg tablet Take 1,000 mcg by mouth daily.  cyclosporine (RESTASIS) 0.05 % ophthalmic emulsion Administer 1 Drop to both eyes two (2) times a day. No current facility-administered medications on file prior to visit. Allergies   Allergen Reactions    Aspirin Nausea and Vomiting    Betadine [Povidone-Iodine] Rash    Nsaids (Non-Steroidal Anti-Inflammatory Drug) Nausea and Vomiting    Other Medication Nausea and Vomiting     General anas. .. - Pt does not know what this is.      Family History   Problem Relation Age of Onset    Coronary Artery Disease Mother     Hypertension Mother     Heart Disease Mother     Diabetes Mother     Pulmonary Embolism Mother     Coronary Artery Disease Father     Heart Disease Father     Hypertension Father     Kidney Disease Father     Thyroid Disease Father     Stroke Maternal Grandmother 76    Diabetes Maternal Grandfather 80    COPD Maternal Grandfather     Hypertension Paternal Grandmother 80    Coronary Artery Disease Paternal Grandfather 80    Hypertension Paternal Grandfather      Social History     Socioeconomic History    Marital status:      Spouse name: Not on file    Number of children: Not on file    Years of education: Not on file    Highest education level: Not on file   Tobacco Use    Smoking status: Never Smoker    Smokeless tobacco: Never Used   Substance and Sexual Activity    Alcohol use: No    Drug use: No    Sexual activity: Not Currently             Review of Systems Constitutional: Negative for chills, diaphoresis, fever, malaise/fatigue and weight loss. Eyes: Negative for blurred vision, double vision, pain and redness. Respiratory: Negative for cough, shortness of breath and wheezing. Cardiovascular: Negative for chest pain, palpitations, orthopnea, claudication, leg swelling and PND. Skin: Negative for itching and rash. Neurological: Negative for dizziness, tingling, tremors, sensory change, speech change, focal weakness, seizures, loss of consciousness, weakness and headaches. Results for orders placed or performed in visit on 43/50/16   METABOLIC PANEL, COMPREHENSIVE   Result Value Ref Range    Glucose 97 65 - 99 mg/dL    BUN 17 8 - 27 mg/dL    Creatinine 0.67 0.57 - 1.00 mg/dL    GFR est non-AA 95 >59 mL/min/1.73    GFR est  >59 mL/min/1.73    BUN/Creatinine ratio 25 12 - 28    Sodium 142 134 - 144 mmol/L    Potassium 4.4 3.5 - 5.2 mmol/L    Chloride 106 96 - 106 mmol/L    CO2 25 20 - 29 mmol/L    Calcium 9.2 8.7 - 10.3 mg/dL    Protein, total 6.6 6.0 - 8.5 g/dL    Albumin 4.3 3.6 - 4.8 g/dL    GLOBULIN, TOTAL 2.3 1.5 - 4.5 g/dL    A-G Ratio 1.9 1.2 - 2.2    Bilirubin, total 0.6 0.0 - 1.2 mg/dL    Alk. phosphatase 157 (H) 39 - 117 IU/L    AST (SGOT) 16 0 - 40 IU/L    ALT (SGPT) 20 0 - 32 IU/L   LIPID PANEL   Result Value Ref Range    Cholesterol, total 176 100 - 199 mg/dL    Triglyceride 108 0 - 149 mg/dL    HDL Cholesterol 56 >39 mg/dL    VLDL, calculated 22 5 - 40 mg/dL    LDL, calculated 98 0 - 99 mg/dL   CVD REPORT   Result Value Ref Range    INTERPRETATION Note          Physical Exam  Visit Vitals  /80 (BP 1 Location: Right arm, BP Patient Position: Sitting)   Pulse 76   Temp 98.5 °F (36.9 °C) (Oral)   Resp 18   Ht 5' 2\" (1.575 m)   Wt 172 lb (78 kg)   SpO2 97%   BMI 31.46 kg/m²       Head: Normocephalic, without obvious abnormality, atraumatic  Eyes: conjunctivae/corneas clear. PERRL, EOM's intact.    Neck: supple, symmetrical, trachea midline, no adenopathy, thyroid: not enlarged, symmetric, no tenderness/mass/nodules, no carotid bruit and no JVD  Lungs: clear to auscultation bilaterally  Heart: regular rate and rhythm, S1, S2 normal, no murmur, click, rub or gallop  Extremities: extremities normal, atraumatic, no cyanosis. Trace edema at most bilaterally. Pulses: 2+ and symmetric  Lymph nodes: Cervical, supraclavicular, and axillary nodes normal.  Neurologic: Grossly normal      ASSESSMENT and PLAN    ICD-10-CM ICD-9-CM    1. Hyperlipidemia LDL goal <130 Q74.2 039.6 METABOLIC PANEL, COMPREHENSIVE      LIPID PANEL   2. History of migraine headaches Z86.69 V12.49    3. Fibromyalgia M79.7 729.1    4. Ductal carcinoma in situ (DCIS) of right breast D05.11 233.0    5. Gastroesophageal reflux disease without esophagitis K21.9 530.81    6. Pedal edema C39.7 327.5 METABOLIC PANEL, COMPREHENSIVE    2019- mild   7. Arthritis M19.90 716.90    8. Spondylosis of lumbar region without myelopathy or radiculopathy M47.816 721.3    9. Gastroesophageal reflux disease with esophagitis K21.0 530.11    10. Osteopenia of spine M85.88 733.90    11. History of migraine Z86.69 V12.49    12. KYLE (dyspnea on exertion) R06.09 786.09     better after continuing mowng o the lawn- c/w deconditioning and now conditioning     Diagnoses and all orders for this visit:    1. Hyperlipidemia LDL goal <163  -     METABOLIC PANEL, COMPREHENSIVE  -     LIPID PANEL    2. History of migraine headaches    3. Fibromyalgia    4. Ductal carcinoma in situ (DCIS) of right breast    5. Gastroesophageal reflux disease without esophagitis    6. Pedal edema  Comments:  2019- mild  Orders:  -     METABOLIC PANEL, COMPREHENSIVE    7. Arthritis    8. Spondylosis of lumbar region without myelopathy or radiculopathy    9. Gastroesophageal reflux disease with esophagitis    10. Osteopenia of spine    11. History of migraine    12.  KYLE (dyspnea on exertion)  Comments:  better after continuing mowng o the lawn- c/w deconditioning and now conditioning    Other orders  -     CVD REPORT      Follow-up and Dispositions    · Return in about 6 months (around 1/29/2020), or if edema/symptoms worsen or fail to improve, for GERD, edema. lab results and schedule of future lab studies reviewed with patient  reviewed diet, exercise and weight control  cardiovascular risk and specific lipid/LDL goals reviewed  reviewed medications and side effects in detail  Please call my office if there are any questions- [phone]. I will arrange for follow up after the lab tests done from today return  Recommended a weekly \"heart check. \" I went into detail how to do this. Call for refills on medications as needed. Discussed expected course/resolution/complications of diagnosis in detail with patient. Medication risks/benefits/costs/interactions/alternatives discussed with patient. Pt was given an after visit summary which includes diagnoses, current medications & vitals. Pt expressed understanding with the diagnosis and plan. Total 25 minutes,60 % counseling re: Recommended a weekly \"heart check. \" I went into detail how to do this. Regular exercise is very important to your health; it helps mentally, physically, socially; it prevents injuries if done properly. Exercise, even as simple as walking 20-30 minutes daily has major benefits to your health even though your \"numbers\" are the same in the lab. See if you can add this into your daily regimen and after a few months it will become a regular habit-\"just something you do,\" like brushing your teeth. A combination of aerobic exercise and strengthening and stretching is felt to be the best for you, so this should be your ultimate goal.   This can be done in the privacy of your home or in a group setting as at the gym  Some prefer having a , others prefer to do exercise in groups or individually. Do what \"works\" for you.  You need to make it simple and \"fun,\" or you most likely you will not continue it. NSAID OTC of choice- aleve 1-2 every 12 hrs or ibuprofen 200 mg 2, 3, or 4 every 6 hrs ( preferably after food to protect the stomach against ulcers ) as needed for pain. Reviewed foods and liquids that are likely to aggravate reflux- alcohol of any type( worst seems to be red wine), acid foods like citrus and tomato based foods; caffeine(in coffee, many sodas, chocolate, and tea) ginger and fried or high fat content foods. Eating food within 4-5 hours of bedtime may also contribute to increased reflux. Talked to her about causes of swelling and since she has had normal labs in the last year, it couldn't be caused by her kidney or liver. Talked about treatment of this swelling, which mainly consists of reducing salt in diet, elevating her legs, and wearing compression hose. Informed patient that the leg swelling is due to venous insufficiency. There are many aggravating factors that, if corrected, will improve the swelling: low sodium diet, weight loss, not standing in one spot but walking will help, leg elevation, support hose, and avoiding NSAIDs( Extra Strength Tylenol 500 mg 1-2 every 6 hours as needed for pain is OK). Fluid pills do help, but do not work directly on the swelling, but remove fluid from the body in general through their effect on the kidney which can cause potassium and sodium problems and potential low blood pressure and fainting. Pt does give a history of KYLE when mowing the lawn but notes this was more bothersome in the Spring. Since she has now been mowing the lawn regularly, she does not have to stop now in the middle to catch her breath. This makes heart failure very unlikely. Encouraged her to follow up if she has worsening of her SOB. Also, discussed symptoms of concern that were noted today in the note above, treatment options( including doing nothing), when to follow up before recommended time frame.  Also, answered all questions. This document was written by Betty Schaefer, as dictated by Percy Jacinto MD.  I have reviewed and agree with the above note and have made corrections where appropriate Wes Montano M.D.

## 2019-07-30 LAB
ALBUMIN SERPL-MCNC: 4.3 G/DL (ref 3.6–4.8)
ALBUMIN/GLOB SERPL: 1.9 {RATIO} (ref 1.2–2.2)
ALP SERPL-CCNC: 157 IU/L (ref 39–117)
ALT SERPL-CCNC: 20 IU/L (ref 0–32)
AST SERPL-CCNC: 16 IU/L (ref 0–40)
BILIRUB SERPL-MCNC: 0.6 MG/DL (ref 0–1.2)
BUN SERPL-MCNC: 17 MG/DL (ref 8–27)
BUN/CREAT SERPL: 25 (ref 12–28)
CALCIUM SERPL-MCNC: 9.2 MG/DL (ref 8.7–10.3)
CHLORIDE SERPL-SCNC: 106 MMOL/L (ref 96–106)
CHOLEST SERPL-MCNC: 176 MG/DL (ref 100–199)
CO2 SERPL-SCNC: 25 MMOL/L (ref 20–29)
CREAT SERPL-MCNC: 0.67 MG/DL (ref 0.57–1)
GLOBULIN SER CALC-MCNC: 2.3 G/DL (ref 1.5–4.5)
GLUCOSE SERPL-MCNC: 97 MG/DL (ref 65–99)
HDLC SERPL-MCNC: 56 MG/DL
INTERPRETATION, 910389: NORMAL
LDLC SERPL CALC-MCNC: 98 MG/DL (ref 0–99)
POTASSIUM SERPL-SCNC: 4.4 MMOL/L (ref 3.5–5.2)
PROT SERPL-MCNC: 6.6 G/DL (ref 6–8.5)
SODIUM SERPL-SCNC: 142 MMOL/L (ref 134–144)
TRIGL SERPL-MCNC: 108 MG/DL (ref 0–149)
VLDLC SERPL CALC-MCNC: 22 MG/DL (ref 5–40)

## 2019-08-04 NOTE — PROGRESS NOTES
GREAT NEWS!! All of your recent lab tests are normal or at goal.  No diabetes, normal liver and kidney tests. Great cholesterol numbers. I would continue everything the same and schedule your next fasting office visit in 6 months. In addition, a physical/ Annual Wellness Visit is recommended yearly after the age of 61.

## 2019-08-05 DIAGNOSIS — M79.7 FIBROMYALGIA: ICD-10-CM

## 2019-08-05 DIAGNOSIS — M19.90 ARTHRITIS: ICD-10-CM

## 2019-08-05 NOTE — TELEPHONE ENCOUNTER
PCP: Concetta Smart MD    Last appt: 7/29/2019  No future appointments.     Requested Prescriptions     Pending Prescriptions Disp Refills    metoprolol tartrate (LOPRESSOR) 50 mg tablet [Pharmacy Med Name: METOPROLOL TARTRATE 50MG TABLETS] 180 Tab 0     Sig: TAKE 1 TABLET BY MOUTH TWICE DAILY

## 2019-08-06 RX ORDER — METOPROLOL TARTRATE 50 MG/1
TABLET ORAL
Qty: 180 TAB | Refills: 0 | Status: SHIPPED | OUTPATIENT
Start: 2019-08-06 | End: 2020-01-06 | Stop reason: SDUPTHER

## 2019-09-04 PROBLEM — K21.00 GASTROESOPHAGEAL REFLUX DISEASE WITH ESOPHAGITIS: Status: ACTIVE | Noted: 2019-09-04

## 2019-09-04 PROBLEM — R06.09 DOE (DYSPNEA ON EXERTION): Status: ACTIVE | Noted: 2019-09-04

## 2019-09-04 PROBLEM — M19.90 ARTHRITIS: Status: ACTIVE | Noted: 2019-09-04

## 2019-09-04 PROBLEM — R60.0 PEDAL EDEMA: Status: ACTIVE | Noted: 2019-09-04

## 2019-09-13 DIAGNOSIS — M19.90 ARTHRITIS: ICD-10-CM

## 2019-09-13 NOTE — TELEPHONE ENCOUNTER
PCP: Randi Goins MD    Last appt: 7/29/2019  No future appointments.     Requested Prescriptions     Pending Prescriptions Disp Refills    celecoxib (CELEBREX) 200 mg capsule [Pharmacy Med Name: CELECOXIB 200MG CAPSULES] 180 Cap 0     Sig: TAKE 1 CAPSULE BY MOUTH TWICE DAILY

## 2019-09-16 RX ORDER — CELECOXIB 200 MG/1
CAPSULE ORAL
Qty: 180 CAP | Refills: 0 | Status: SHIPPED | OUTPATIENT
Start: 2019-09-16 | End: 2019-12-13 | Stop reason: SDUPTHER

## 2019-09-16 NOTE — TELEPHONE ENCOUNTER
Needs to schedule an  office visit in January Once visit is scheduled we can refill medication until appointment.

## 2019-09-16 NOTE — TELEPHONE ENCOUNTER
Patient is returning a call back to the office. Pt has an appointment scheduled for January 2020, to further refill medication.        celecoxib (CELEBREX) 200 mg capsule       Best callback:  [phone]       LOV:  Monday, July 29, 2019  UOV:  Monday, January 6, 2020

## 2019-11-04 RX ORDER — ACETAMINOPHEN 500 MG
2000 TABLET ORAL DAILY
COMMUNITY
End: 2022-02-01

## 2019-11-05 ENCOUNTER — ANESTHESIA EVENT (OUTPATIENT)
Dept: ENDOSCOPY | Age: 64
End: 2019-11-05
Payer: COMMERCIAL

## 2019-11-05 ENCOUNTER — ANESTHESIA (OUTPATIENT)
Dept: ENDOSCOPY | Age: 64
End: 2019-11-05
Payer: COMMERCIAL

## 2019-11-05 ENCOUNTER — HOSPITAL ENCOUNTER (OUTPATIENT)
Age: 64
Setting detail: OUTPATIENT SURGERY
Discharge: HOME | End: 2019-11-05
Attending: SPECIALIST | Admitting: SPECIALIST
Payer: COMMERCIAL

## 2019-11-05 VITALS
WEIGHT: 172 LBS | DIASTOLIC BLOOD PRESSURE: 78 MMHG | TEMPERATURE: 97.6 F | HEIGHT: 62 IN | RESPIRATION RATE: 11 BRPM | OXYGEN SATURATION: 99 % | HEART RATE: 81 BPM | SYSTOLIC BLOOD PRESSURE: 113 MMHG | BODY MASS INDEX: 31.65 KG/M2

## 2019-11-05 PROCEDURE — 74011250636 HC RX REV CODE- 250/636: Performed by: SPECIALIST

## 2019-11-05 PROCEDURE — 74011000250 HC RX REV CODE- 250: Performed by: NURSE ANESTHETIST, CERTIFIED REGISTERED

## 2019-11-05 PROCEDURE — 88305 TISSUE EXAM BY PATHOLOGIST: CPT

## 2019-11-05 PROCEDURE — 77030021593 HC FCPS BIOP ENDOSC BSC -A: Performed by: SPECIALIST

## 2019-11-05 PROCEDURE — 76060000034 HC ANESTHESIA 1.5 TO 2 HR: Performed by: SPECIALIST

## 2019-11-05 PROCEDURE — 76040000009: Performed by: SPECIALIST

## 2019-11-05 PROCEDURE — 74011250636 HC RX REV CODE- 250/636: Performed by: NURSE ANESTHETIST, CERTIFIED REGISTERED

## 2019-11-05 PROCEDURE — 77030029384 HC SNR POLYP CAPTVR II BSC -B: Performed by: SPECIALIST

## 2019-11-05 RX ORDER — SODIUM CHLORIDE 0.9 % (FLUSH) 0.9 %
5-40 SYRINGE (ML) INJECTION AS NEEDED
Status: DISCONTINUED | OUTPATIENT
Start: 2019-11-05 | End: 2019-11-05 | Stop reason: HOSPADM

## 2019-11-05 RX ORDER — SODIUM CHLORIDE 0.9 % (FLUSH) 0.9 %
5-40 SYRINGE (ML) INJECTION EVERY 8 HOURS
Status: DISCONTINUED | OUTPATIENT
Start: 2019-11-05 | End: 2019-11-05 | Stop reason: HOSPADM

## 2019-11-05 RX ORDER — DEXTROMETHORPHAN/PSEUDOEPHED 2.5-7.5/.8
1.2 DROPS ORAL
Status: DISCONTINUED | OUTPATIENT
Start: 2019-11-05 | End: 2019-11-05 | Stop reason: HOSPADM

## 2019-11-05 RX ORDER — FLUMAZENIL 0.1 MG/ML
0.2 INJECTION INTRAVENOUS
Status: DISCONTINUED | OUTPATIENT
Start: 2019-11-05 | End: 2019-11-05 | Stop reason: HOSPADM

## 2019-11-05 RX ORDER — LIDOCAINE HYDROCHLORIDE 20 MG/ML
INJECTION, SOLUTION EPIDURAL; INFILTRATION; INTRACAUDAL; PERINEURAL AS NEEDED
Status: DISCONTINUED | OUTPATIENT
Start: 2019-11-05 | End: 2019-11-05 | Stop reason: HOSPADM

## 2019-11-05 RX ORDER — EPINEPHRINE 0.1 MG/ML
1 INJECTION INTRACARDIAC; INTRAVENOUS
Status: DISCONTINUED | OUTPATIENT
Start: 2019-11-05 | End: 2019-11-05 | Stop reason: HOSPADM

## 2019-11-05 RX ORDER — NALOXONE HYDROCHLORIDE 0.4 MG/ML
0.4 INJECTION, SOLUTION INTRAMUSCULAR; INTRAVENOUS; SUBCUTANEOUS
Status: DISCONTINUED | OUTPATIENT
Start: 2019-11-05 | End: 2019-11-05 | Stop reason: HOSPADM

## 2019-11-05 RX ORDER — SODIUM CHLORIDE 9 MG/ML
50 INJECTION, SOLUTION INTRAVENOUS CONTINUOUS
Status: DISCONTINUED | OUTPATIENT
Start: 2019-11-05 | End: 2019-11-05 | Stop reason: HOSPADM

## 2019-11-05 RX ORDER — ATROPINE SULFATE 0.1 MG/ML
0.5 INJECTION INTRAVENOUS
Status: DISCONTINUED | OUTPATIENT
Start: 2019-11-05 | End: 2019-11-05 | Stop reason: HOSPADM

## 2019-11-05 RX ORDER — PROPOFOL 10 MG/ML
INJECTION, EMULSION INTRAVENOUS AS NEEDED
Status: DISCONTINUED | OUTPATIENT
Start: 2019-11-05 | End: 2019-11-05 | Stop reason: HOSPADM

## 2019-11-05 RX ORDER — FENTANYL CITRATE 50 UG/ML
12.5-5 INJECTION, SOLUTION INTRAMUSCULAR; INTRAVENOUS
Status: DISCONTINUED | OUTPATIENT
Start: 2019-11-05 | End: 2019-11-05 | Stop reason: HOSPADM

## 2019-11-05 RX ORDER — MIDAZOLAM HYDROCHLORIDE 1 MG/ML
.25-5 INJECTION, SOLUTION INTRAMUSCULAR; INTRAVENOUS
Status: DISCONTINUED | OUTPATIENT
Start: 2019-11-05 | End: 2019-11-05 | Stop reason: HOSPADM

## 2019-11-05 RX ADMIN — PROPOFOL 30 MG: 10 INJECTION, EMULSION INTRAVENOUS at 13:05

## 2019-11-05 RX ADMIN — PROPOFOL 30 MG: 10 INJECTION, EMULSION INTRAVENOUS at 12:16

## 2019-11-05 RX ADMIN — PROPOFOL 30 MG: 10 INJECTION, EMULSION INTRAVENOUS at 12:41

## 2019-11-05 RX ADMIN — PROPOFOL 30 MG: 10 INJECTION, EMULSION INTRAVENOUS at 12:59

## 2019-11-05 RX ADMIN — PROPOFOL 50 MG: 10 INJECTION, EMULSION INTRAVENOUS at 12:15

## 2019-11-05 RX ADMIN — PROPOFOL 30 MG: 10 INJECTION, EMULSION INTRAVENOUS at 12:51

## 2019-11-05 RX ADMIN — PROPOFOL 30 MG: 10 INJECTION, EMULSION INTRAVENOUS at 12:48

## 2019-11-05 RX ADMIN — PROPOFOL 30 MG: 10 INJECTION, EMULSION INTRAVENOUS at 12:17

## 2019-11-05 RX ADMIN — PROPOFOL 30 MG: 10 INJECTION, EMULSION INTRAVENOUS at 12:54

## 2019-11-05 RX ADMIN — PROPOFOL 30 MG: 10 INJECTION, EMULSION INTRAVENOUS at 12:19

## 2019-11-05 RX ADMIN — SODIUM CHLORIDE: 900 INJECTION, SOLUTION INTRAVENOUS at 12:12

## 2019-11-05 RX ADMIN — PROPOFOL 30 MG: 10 INJECTION, EMULSION INTRAVENOUS at 12:35

## 2019-11-05 RX ADMIN — PROPOFOL 30 MG: 10 INJECTION, EMULSION INTRAVENOUS at 12:25

## 2019-11-05 RX ADMIN — PROPOFOL 30 MG: 10 INJECTION, EMULSION INTRAVENOUS at 12:44

## 2019-11-05 RX ADMIN — PROPOFOL 30 MG: 10 INJECTION, EMULSION INTRAVENOUS at 12:21

## 2019-11-05 RX ADMIN — SODIUM CHLORIDE: 900 INJECTION, SOLUTION INTRAVENOUS at 13:03

## 2019-11-05 RX ADMIN — PROPOFOL 30 MG: 10 INJECTION, EMULSION INTRAVENOUS at 12:32

## 2019-11-05 RX ADMIN — PROPOFOL 30 MG: 10 INJECTION, EMULSION INTRAVENOUS at 12:18

## 2019-11-05 RX ADMIN — PROPOFOL 30 MG: 10 INJECTION, EMULSION INTRAVENOUS at 12:29

## 2019-11-05 RX ADMIN — PROPOFOL 30 MG: 10 INJECTION, EMULSION INTRAVENOUS at 12:56

## 2019-11-05 RX ADMIN — PROPOFOL 30 MG: 10 INJECTION, EMULSION INTRAVENOUS at 12:38

## 2019-11-05 RX ADMIN — LIDOCAINE HYDROCHLORIDE 80 MG: 20 INJECTION, SOLUTION EPIDURAL; INFILTRATION; INTRACAUDAL; PERINEURAL at 12:15

## 2019-11-05 RX ADMIN — PROPOFOL 30 MG: 10 INJECTION, EMULSION INTRAVENOUS at 13:02

## 2019-11-05 NOTE — ANESTHESIA POSTPROCEDURE EVALUATION
Post-Anesthesia Evaluation and Assessment    Patient: Jason Wilson MRN: [id number]  SSN: [id number]    YOB: 1955  Age: 62 y.o. Sex: female      I have evaluated the patient and they are stable and ready for discharge from the PACU. Cardiovascular Function/Vital Signs  Visit Vitals  /78   Pulse 81   Temp 36.4 °C (97.6 °F)   Resp 11   Ht 5' 2\" (1.575 m)   Wt 78 kg (172 lb)   SpO2 99%   BMI 31.46 kg/m²       Patient is status post MAC anesthesia for Procedure(s):  COLONOSCOPY  ENDOSCOPIC POLYPECTOMY. Nausea/Vomiting: None    Postoperative hydration reviewed and adequate. Pain:  Pain Scale 1: Numeric (0 - 10) (11/05/19 1334)  Pain Intensity 1: 0 (11/05/19 1334)   Managed    Neurological Status: At baseline    Mental Status, Level of Consciousness: Alert and  oriented to person, place, and time    Pulmonary Status:   O2 Device: Room air (11/05/19 1334)   Adequate oxygenation and airway patent    Complications related to anesthesia: None    Post-anesthesia assessment completed. No concerns    Signed By: Rubina Pinedo MD     November 5, 2019              Procedure(s):  COLONOSCOPY  ENDOSCOPIC POLYPECTOMY. MAC    <BSHSIANPOST>    Vitals Value Taken Time   /78 11/5/2019  1:35 PM   Temp 36.4 °C (97.6 °F) 11/5/2019  1:14 PM   Pulse 79 11/5/2019  1:37 PM   Resp 17 11/5/2019  1:37 PM   SpO2 100 % 11/5/2019  1:37 PM   Vitals shown include unvalidated device data.

## 2019-11-05 NOTE — DISCHARGE INSTRUCTIONS
Yazmin Kiser  [id number]  1955    COLON DISCHARGE INSTRUCTIONS  Discomfort:  Redness at IV site- apply warm compress to area; if redness or soreness persist- contact your physician  There may be a slight amount of blood passed from the rectum  Gaseous discomfort- walking, belching will help relieve any discomfort  You may not operate a vehicle for 12 hours  You may not engage in an occupation involving machinery or appliances for rest of today  You may not drink alcoholic beverages for at least 12 hours  Avoid making any critical decisions for at least 24 hour  DIET:   Regular diet. - however -  remember your colon is empty and a heavy meal will produce gas. Avoid these foods:  vegetables, fried / greasy foods, carbonated drinks for today. ACTIVITY:  You may resume your normal daily activities it is recommended that you spend the remainder of the day resting -  avoid any strenuous activity. CALL M.D. ANY SIGN OF:  Increasing pain, nausea, vomiting  Abdominal distension (swelling)  New increased bleeding (oral or rectal)  Fever (chills)  Pain in chest area  Bloody discharge from nose or mouth  Shortness of breath    You may not  take any Advil, Aspirin, Ibuprofen, Motrin, Aleve, Goodys, or any similar pain or arthritis products for 3 days, ONLY  Tylenol as needed for pain. Follow-up Instructions:   Call Dr. Victoria Stage  Results of procedure / biopsy in 10-14days   Office telephone for problems or questions [phone]    Recommendation for next colonscopy in 3 years  If < 10 years, reason:  above average risk patient    Patient Education        Colon Polyps: Care Instructions  Your Care Instructions    Colon polyps are growths in the colon or the rectum. The cause of most colon polyps is not known, and most people who get them do not have any problems. But a certain kind can turn into cancer. For this reason, regular testing for colon polyps is important for people as they get older.  It is also important for anyone who has an increased risk for colon cancer. Polyps are usually found through routine colon cancer screening tests. Although most colon polyps are not cancerous, they are usually removed and then tested for cancer. Screening for colon cancer saves lives because the cancer can usually be cured if it is caught early. If you have a polyp that is the type that can turn into cancer, you may need more tests to examine your entire colon. The doctor will remove any other polyps that he or she finds, and you will be tested more often. Follow-up care is a key part of your treatment and safety. Be sure to make and go to all appointments, and call your doctor if you are having problems. It's also a good idea to know your test results and keep a list of the medicines you take. How can you care for yourself at home? Regular exams to look for colon polyps are the best way to prevent polyps from turning into colon cancer. These can include stool tests, sigmoidoscopy, colonoscopy, and CT colonography. Talk with your doctor about a testing schedule that is right for you. To prevent polyps  There is no home treatment that can prevent colon polyps. But these steps may help lower your risk for cancer. · Stay active. Being active can help you get to and stay at a healthy weight. Try to exercise on most days of the week. Walking is a good choice. · Eat well. Choose a variety of vegetables, fruits, legumes (such as peas and beans), fish, poultry, and whole grains. · Do not smoke. If you need help quitting, talk to your doctor about stop-smoking programs and medicines. These can increase your chances of quitting for good. · If you drink alcohol, limit how much you drink. Limit alcohol to 2 drinks a day for men and 1 drink a day for women. When should you call for help?   Call your doctor now or seek immediate medical care if:    · You have severe belly pain.     · Your stools are maroon or very bloody.   [place] closely for changes in your health, and be sure to contact your doctor if:    · You have a fever.     · You have nausea or vomiting.     · You have a change in bowel habits (new constipation or diarrhea).     · Your symptoms get worse or are not improving as expected. Where can you learn more? Go to http://alley-myla.info/. Enter [id number] in the search box to learn more about \"Colon Polyps: Care Instructions. \"  Current as of: December 19, 2018  Content Version: 12.2  © 5458-9915 [employer]. Care instructions adapted under license by [employer] (which disclaims liability or warranty for this information). If you have questions about a medical condition or this instruction, always ask your healthcare professional. [address] any warranty or liability for your use of this information.

## 2019-11-05 NOTE — PERIOP NOTES

## 2019-11-05 NOTE — PERIOP NOTES
Tammie Perales  1955  [id number]    Situation:  Verbal report received from: PANDA Buchanan RN  Procedure: Procedure(s):  COLONOSCOPY  ENDOSCOPIC POLYPECTOMY    Background:    Preoperative diagnosis: PERSONAL HISTORY COLON POLYPS  Postoperative diagnosis: colon polyp    :  Dr. Andrews De Los Santos  Assistant(s): Endoscopy Technician-1: Major Biggs  Endoscopy RN-1: Hannah Valle RN    Specimens:   ID Type Source Tests Collected by Time Destination   1 : polyp Preservative Cecum  Macy Yap MD 11/5/2019 1302 Pathology     H. Pylori  no    Assessment:  Intra-procedure medications     Anesthesia gave intra-procedure sedation and medications, see anesthesia flow sheet yes    Intravenous fluids: NS@ KVO     Vital signs stable     Abdominal assessment: round and soft     Recommendation:  Discharge patient per MD order.   Family or Friend   Permission to share finding with family or friend yes

## 2019-11-05 NOTE — H&P
Pre-endoscopy H and P for Colonoscopy    The patient was seen and examined. Date of last colonoscopy: 2016, Polyps  Yes      The airway was assessed and documented. The problem list, past medical history, and medications were reviewed.      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.10    Migraine G43.909    GERD (gastroesophageal reflux disease) K21.9    Fibromyalgia M79.7    Colonic polyp-  lg @ hepatic flexure-4/2015 K63.5    History of migraine headaches Z86.69    History of migraine Z86.69    Osteopenia of spine M85.88    Osteoarthritis of spine M47.9    Spondylosis of lumbar region without myelopathy or radiculopathy M47.816    Gastroesophageal reflux disease with esophagitis K21.0    Pedal edema R60.0    KYLE (dyspnea on exertion) R06.09    Arthritis M19.90     Social History     Socioeconomic History    Marital status:      Spouse name: Not on file    Number of children: Not on file    Years of education: Not on file    Highest education level: Not on file   Occupational History    Not on file   Social Needs    Financial resource strain: Not on file    Food insecurity:     Worry: Not on file     Inability: Not on file    Transportation needs:     Medical: Not on file     Non-medical: Not on file   Tobacco Use    Smoking status: Never Smoker    Smokeless tobacco: Never Used   Substance and Sexual Activity    Alcohol use: No    Drug use: No    Sexual activity: Not Currently   Lifestyle    Physical activity:     Days per week: Not on file     Minutes per session: Not on file    Stress: Not on file   Relationships    Social connections:     Talks on phone: Not on file     Gets together: Not on file     Attends Oriental orthodox service: Not on file     Active member of club or organization: Not on file     Attends meetings of clubs or organizations: Not on file     Relationship status: Not on file    Intimate partner violence:     Fear of current or ex partner: Not on file     Emotionally abused: Not on file     Physically abused: Not on file     Forced sexual activity: Not on file   Other Topics Concern    Not on file   Social History Narrative    Not on file     Past Medical History:   Diagnosis Date    Arthritis 12/20/2010    Cancer [facility]) 2008    right breast cancer    Colon polyp     DCIS (ductal carcinoma in situ) of breast 12/20/2010    right/surgery and radiation    Fibromyalgia 12/20/2010    GERD (gastroesophageal reflux disease) 12/20/2010    History of migraine 7/19/2018    History of migraine headaches 7/20/2017    Migraine 12/20/2010    Nausea & vomiting      The patient has a family history of na    Prior to Admission Medications   Prescriptions Last Dose Informant Patient Reported? Taking? SUMAtriptan (IMITREX) 50 mg tablet 10/29/2019 at Unknown time  No Yes   Sig: TAKE 1 TABLET BY MOUTH ONCE AS NEEDED FOR MIGRAINE FOR UP TO ONE DOSE. MAY REPEAT IN 2 HOURS   aspirin delayed-release 81 mg tablet 10/30/2019  Yes Yes   Sig: Take 1 Tab by mouth daily. celecoxib (CELEBREX) 200 mg capsule 11/4/2019 at Unknown time  No Yes   Sig: TAKE 1 CAPSULE BY MOUTH TWICE DAILY   cholecalciferol (VITAMIN D3) 2,000 unit cap capsule 11/4/2019 at Unknown time  Yes Yes   Sig: Take 2,000 Units by mouth daily. cyanocobalamin (VITAMIN B-12) 1,000 mcg tablet 11/4/2019 at Unknown time  Yes Yes   Sig: Take 1,000 mcg by mouth daily. cyclosporine (RESTASIS) 0.05 % ophthalmic emulsion 11/5/2019 at Unknown time  Yes Yes   Sig: Administer 1 Drop to both eyes two (2) times a day.    esomeprazole (NEXIUM) 40 mg capsule 11/5/2019 at Unknown time  No Yes   Sig: TAKE 1 CAPSULE BY MOUTH TWICE DAILY   metoprolol tartrate (LOPRESSOR) 50 mg tablet 11/5/2019 at Unknown time  No Yes   Sig: TAKE 1 TABLET BY MOUTH TWICE DAILY   traMADol (ULTRAM) 50 mg tablet 11/4/2019 at Unknown time  Yes Yes   Sig: Take 50 mg by mouth every six (6) hours as needed. Facility-Administered Medications: None         The review of systems is:  negative for shortness of breath or chest pain      The heart, lungs and mental status were satisfactory for the administration of MAC sedation and for the procedure. Mallampati score: See Anesthesia. I discussed with the patient the objectives, risks, consequences and alternatives to the procedure. Plan: Endoscopic procedure with MAC sedation.     Petrona Ireland MD  11/5/2019  12:11 PM

## 2019-11-06 RX ORDER — FLUMAZENIL 0.1 MG/ML
0.2 INJECTION INTRAVENOUS
Status: CANCELLED | OUTPATIENT
Start: 2019-11-06 | End: 2019-11-06

## 2019-11-06 RX ORDER — ATROPINE SULFATE 0.1 MG/ML
0.5 INJECTION INTRAVENOUS
Status: CANCELLED | OUTPATIENT
Start: 2019-11-06 | End: 2019-11-07

## 2019-11-06 RX ORDER — MIDAZOLAM HYDROCHLORIDE 1 MG/ML
.25-5 INJECTION, SOLUTION INTRAMUSCULAR; INTRAVENOUS
Status: CANCELLED | OUTPATIENT
Start: 2019-11-06 | End: 2019-11-06

## 2019-11-06 RX ORDER — SODIUM CHLORIDE 0.9 % (FLUSH) 0.9 %
5-40 SYRINGE (ML) INJECTION AS NEEDED
Status: CANCELLED | OUTPATIENT
Start: 2019-11-06

## 2019-11-06 RX ORDER — EPINEPHRINE 0.1 MG/ML
1 INJECTION INTRACARDIAC; INTRAVENOUS
Status: CANCELLED | OUTPATIENT
Start: 2019-11-06 | End: 2019-11-07

## 2019-11-06 RX ORDER — FENTANYL CITRATE 50 UG/ML
12.5-5 INJECTION, SOLUTION INTRAMUSCULAR; INTRAVENOUS
Status: CANCELLED | OUTPATIENT
Start: 2019-11-06 | End: 2019-11-06

## 2019-11-06 RX ORDER — NALOXONE HYDROCHLORIDE 0.4 MG/ML
0.4 INJECTION, SOLUTION INTRAMUSCULAR; INTRAVENOUS; SUBCUTANEOUS
Status: CANCELLED | OUTPATIENT
Start: 2019-11-06 | End: 2019-11-06

## 2019-11-06 RX ORDER — SODIUM CHLORIDE 9 MG/ML
50 INJECTION, SOLUTION INTRAVENOUS CONTINUOUS
Status: CANCELLED | OUTPATIENT
Start: 2019-11-06 | End: 2019-11-06

## 2019-11-06 RX ORDER — SODIUM CHLORIDE 0.9 % (FLUSH) 0.9 %
5-40 SYRINGE (ML) INJECTION EVERY 8 HOURS
Status: CANCELLED | OUTPATIENT
Start: 2019-11-06

## 2019-11-06 RX ORDER — DEXTROMETHORPHAN/PSEUDOEPHED 2.5-7.5/.8
1.2 DROPS ORAL
Status: CANCELLED | OUTPATIENT
Start: 2019-11-06

## 2019-11-06 NOTE — PROCEDURES
Colonoscopy Procedure Note    Indications:   Family history of coloretal cancer (screening only), Family history of coloretal adenoma  (screening only), Personal history of colon polyps (screening only), original 1.5 cm adenoma resected and marked at hepatic flexure recurrent small polyp in area snared and ablated with APC also cecal polyp  Referring Physician: Cyndee Hastings MD   Anesthesia/Sedation:MAC  Endoscopist:  Dr. Johan Hanna  Assistant:  Endoscopy Technician-1: Major Biggs  Endoscopy RN-1: Hannah Valle RN  Surgical Assistant: None  Implants: None    Preoperative diagnosis: PERSONAL HISTORY COLON POLYPS,FHX OF COLON CA AND POLYPS    Postoperative diagnosis: colon polyp      Procedure in Detail:  Informed consent was obtained for the procedure, including sedation. Risks of perforation, hemorrhage, adverse drug reaction, and aspiration were discussed. The patient was placed in the left lateral decubitus position. Based on the pre-procedure assessment, including review of the patient's medical history, medications, allergies, and review of systems, she had been deemed to be an appropriate candidate for moderate sedation; she was therefore sedated with the medications listed above. The patient was monitored continuously with ECG tracing, pulse oximetry, blood pressure monitoring, and direct observations. A rectal examination was performed. The [id number] was inserted into the rectum and advanced under direct vision to the cecum, which was identified by the ileocecal valve and appendiceal orifice. The quality of the colonic preparation was excellent. A careful inspection was made as the colonoscope was withdrawn, including a retroflexed view of the rectum; findings and interventions are described below. Findings: surprisingly difficult intubation into the cecum due to persistent looping.  No comment about significant redundancy at time of last exam  Rectum: normal  Sigmoid: normal  Descending Colon: normal  Transverse Colon: hepatic flexure tattoo noted no recurrent polyps noted  Ascending Colon: normal  Cecum: 3 mm oblong flat polyp removed with cold snare  Terminal Ileum: not intubated    Specimens:     see above    EBL: None    Complications: None; patient tolerated the procedure well. Recommendations:     - Await pathology. - Repeat colonoscopy in 3 years.      - If < 10 years, reason: above average risk patient    Signed By: Crystal Mendez MD                        November 6, 2019

## 2019-12-07 DIAGNOSIS — Z86.69 HISTORY OF MIGRAINE HEADACHES: Chronic | ICD-10-CM

## 2019-12-09 RX ORDER — SUMATRIPTAN 50 MG/1
TABLET, FILM COATED ORAL
Qty: 10 TAB | Refills: 0 | Status: SHIPPED | OUTPATIENT
Start: 2019-12-09 | End: 2020-01-06 | Stop reason: SDUPTHER

## 2019-12-09 NOTE — TELEPHONE ENCOUNTER
PCP: Omar Mars MD    Last appt: 7/29/2019  Future Appointments   Date Time Provider Alberto Santiago   1/6/2020  2:15 PM Omar Mars MD [facility] SCHED       Requested Prescriptions     Pending Prescriptions Disp Refills    SUMAtriptan (IMITREX) 50 mg tablet [Pharmacy Med Name: SUMATRIPTAN 50MG TABLETS] 10 Tab 0     Sig: TAKE 1 TABLET BY MOUTH ONCE AS NEEDED FOR MIGRAINE FOR UP TO ONE DOSE. MAY REPEAT IN 2 HOURS

## 2019-12-13 DIAGNOSIS — M19.90 ARTHRITIS: ICD-10-CM

## 2019-12-13 RX ORDER — CELECOXIB 200 MG/1
CAPSULE ORAL
Qty: 180 CAP | Refills: 0 | Status: SHIPPED | OUTPATIENT
Start: 2019-12-13 | End: 2020-01-06 | Stop reason: SDUPTHER

## 2019-12-13 NOTE — TELEPHONE ENCOUNTER
PCP: Milla Terrell MD    Last appt: 7/29/2019  Future Appointments   Date Time Provider Alberto Galvezi   1/6/2020  2:15 PM Milla Terrell MD [facility]       Requested Prescriptions     Pending Prescriptions Disp Refills    celecoxib (CELEBREX) 200 mg capsule [Pharmacy Med Name: CELECOXIB 200MG CAPSULES] 180 Cap 0     Sig: TAKE 1 CAPSULE BY MOUTH TWICE DAILY

## 2020-01-06 ENCOUNTER — OFFICE VISIT (OUTPATIENT)
Dept: FAMILY MEDICINE CLINIC | Age: 65
End: 2020-01-06

## 2020-01-06 VITALS
WEIGHT: 171 LBS | TEMPERATURE: 97.8 F | HEIGHT: 62 IN | DIASTOLIC BLOOD PRESSURE: 80 MMHG | SYSTOLIC BLOOD PRESSURE: 132 MMHG | OXYGEN SATURATION: 98 % | HEART RATE: 68 BPM | BODY MASS INDEX: 31.47 KG/M2 | RESPIRATION RATE: 16 BRPM

## 2020-01-06 DIAGNOSIS — M47.816 SPONDYLOSIS OF LUMBAR REGION WITHOUT MYELOPATHY OR RADICULOPATHY: ICD-10-CM

## 2020-01-06 DIAGNOSIS — I10 ESSENTIAL HYPERTENSION: ICD-10-CM

## 2020-01-06 DIAGNOSIS — K21.00 GASTROESOPHAGEAL REFLUX DISEASE WITH ESOPHAGITIS: ICD-10-CM

## 2020-01-06 DIAGNOSIS — D05.11 DUCTAL CARCINOMA IN SITU (DCIS) OF RIGHT BREAST: ICD-10-CM

## 2020-01-06 DIAGNOSIS — M85.88 OSTEOPENIA OF LUMBAR SPINE: ICD-10-CM

## 2020-01-06 DIAGNOSIS — Z86.69 HISTORY OF MIGRAINE HEADACHES: Chronic | ICD-10-CM

## 2020-01-06 DIAGNOSIS — E55.9 VITAMIN D DEFICIENCY: ICD-10-CM

## 2020-01-06 DIAGNOSIS — M79.7 FIBROMYALGIA: Primary | ICD-10-CM

## 2020-01-06 DIAGNOSIS — M19.90 ARTHRITIS: ICD-10-CM

## 2020-01-06 RX ORDER — METOPROLOL TARTRATE 50 MG/1
TABLET ORAL
Qty: 180 TAB | Refills: 1 | Status: SHIPPED | OUTPATIENT
Start: 2020-01-06 | End: 2020-07-24

## 2020-01-06 RX ORDER — TRAMADOL HYDROCHLORIDE 50 MG/1
50 TABLET ORAL
Qty: 90 TAB | Refills: 5 | Status: SHIPPED | OUTPATIENT
Start: 2020-01-06 | End: 2020-07-24

## 2020-01-06 RX ORDER — ESOMEPRAZOLE MAGNESIUM 40 MG/1
CAPSULE, DELAYED RELEASE ORAL
Qty: 180 CAP | Refills: 2 | Status: SHIPPED | OUTPATIENT
Start: 2020-01-06 | End: 2020-10-10

## 2020-01-06 RX ORDER — SUMATRIPTAN 50 MG/1
TABLET, FILM COATED ORAL
Qty: 10 TAB | Refills: 5 | Status: SHIPPED | OUTPATIENT
Start: 2020-01-06 | End: 2021-03-04

## 2020-01-06 RX ORDER — CELECOXIB 200 MG/1
200 CAPSULE ORAL 2 TIMES DAILY
Qty: 180 CAP | Refills: 1 | Status: SHIPPED | OUTPATIENT
Start: 2020-01-06 | End: 2020-03-19

## 2020-01-06 NOTE — PROGRESS NOTES
Chief Complaint   Patient presents with    Wrist Injury     right wrist doesnt know what happened     Hypertension   1. Have you been to the ER, urgent care clinic since your last visit? Hospitalized since your last visit? No    2. Have you seen or consulted any other health care providers outside of the [address] [place] [address] since your last visit? Include any pap smears or colon screening.  Dr Angel Luis [place] colonoscopy

## 2020-01-07 LAB
25(OH)D3+25(OH)D2 SERPL-MCNC: 52.4 NG/ML (ref 30–100)
ALBUMIN SERPL-MCNC: 4.4 G/DL (ref 3.6–4.8)
ALBUMIN/GLOB SERPL: 1.9 {RATIO} (ref 1.2–2.2)
ALP SERPL-CCNC: 168 IU/L (ref 39–117)
ALT SERPL-CCNC: 22 IU/L (ref 0–32)
AST SERPL-CCNC: 19 IU/L (ref 0–40)
BILIRUB SERPL-MCNC: 0.3 MG/DL (ref 0–1.2)
BUN SERPL-MCNC: 16 MG/DL (ref 8–27)
BUN/CREAT SERPL: 24 (ref 12–28)
CALCIUM SERPL-MCNC: 9.3 MG/DL (ref 8.7–10.3)
CHLORIDE SERPL-SCNC: 105 MMOL/L (ref 96–106)
CO2 SERPL-SCNC: 23 MMOL/L (ref 20–29)
CREAT SERPL-MCNC: 0.67 MG/DL (ref 0.57–1)
GLOBULIN SER CALC-MCNC: 2.3 G/DL (ref 1.5–4.5)
GLUCOSE SERPL-MCNC: 85 MG/DL (ref 65–99)
POTASSIUM SERPL-SCNC: 4.2 MMOL/L (ref 3.5–5.2)
PROT SERPL-MCNC: 6.7 G/DL (ref 6–8.5)
SODIUM SERPL-SCNC: 144 MMOL/L (ref 134–144)

## 2020-01-07 NOTE — PROGRESS NOTES
HISTORY OF PRESENT ILLNESS  ALESSANDRO Paredes is a 62 y.o. female with a history of migraine, GERD, colon polyp, osteopenia and osteoarthritis of spine, lumbar spondylosis, appendectomy, DCIS of breast and fibromyalgia, who presents to the office today for a follow up on her HTN. She notes she is checking her BP out of office occasionally, averaging readings around 135/80. She had a colonoscopy in November and due to a family history of colon cancer and polyps being found, they recommended she continue repeating this every 3 years. She did have her shingles vaccine first in August, with the second in November. She continues to take supplement B12 and vitamin D, the latter is 2000 units daily. She takes these due to being borderline B12 in the past and having an actual deficiency of vitamin D. She continues to get her pap smear and mammogram through the [facility]. Pt denies unusual SOB, chest pain, and any recent ER visits or hospitalizations.            Past Medical History:   Diagnosis Date    Arthritis 2010    Cancer [facility])     right breast cancer    Colon polyp     DCIS (ductal carcinoma in situ) of breast 2010    right/surgery and radiation    Fibromyalgia 2010    GERD (gastroesophageal reflux disease) 2010    History of migraine 2018    History of migraine headaches 2017    Migraine 2010    Nausea & vomiting      Past Surgical History:   Procedure Laterality Date    COLONOSCOPY N/A 2016    COLONOSCOPY performed by lGen Nazario MD at [address] COLONOSCOPY N/A 2019    COLONOSCOPY performed by Tamy Plascencia MD at [address] [place] [address]       x2    HX APPENDECTOMY      HX MASTECTOMY      right,then reconstruction/implant     Current Outpatient Medications on File Prior to Visit   Medication Sig Dispense Refill    cholecalciferol (VITAMIN D3) 2,000 unit cap capsule Take 2,000 Units by mouth daily.      cyanocobalamin (VITAMIN B-12) 1,000 mcg tablet Take 1,000 mcg by mouth daily.  cyclosporine (RESTASIS) 0.05 % ophthalmic emulsion Administer 1 Drop to both eyes two (2) times a day.  aspirin delayed-release 81 mg tablet Take 1 Tab by mouth daily. No current facility-administered medications on file prior to visit. Allergies   Allergen Reactions    Aspirin Nausea and Vomiting    Betadine [Povidone-Iodine] Rash    Nsaids (Non-Steroidal Anti-Inflammatory Drug) Nausea and Vomiting    Other Medication Nausea and Vomiting     General anes. .. - Pt does not know what this is. Family History   Problem Relation Age of Onset    Coronary Artery Disease Mother     Hypertension Mother     Heart Disease Mother     Diabetes Mother     Pulmonary Embolism Mother     Coronary Artery Disease Father     Heart Disease Father     Hypertension Father     Kidney Disease Father     Thyroid Disease Father     Stroke Maternal Grandmother 76    Diabetes Maternal Grandfather 80    COPD Maternal Grandfather     Hypertension Paternal Grandmother 80    Coronary Artery Disease Paternal Grandfather 80    Hypertension Paternal Grandfather      Social History     Socioeconomic History    Marital status:      Spouse name: Not on file    Number of children: Not on file    Years of education: Not on file    Highest education level: Not on file   Tobacco Use    Smoking status: Never Smoker    Smokeless tobacco: Never Used   Substance and Sexual Activity    Alcohol use: No    Drug use: No    Sexual activity: Not Currently             Review of Systems   Constitutional: Negative for chills, diaphoresis, fever, malaise/fatigue and weight loss. Eyes: Negative for blurred vision, double vision, pain and redness. Respiratory: Negative for cough, shortness of breath and wheezing. Cardiovascular: Negative for chest pain, palpitations, orthopnea, claudication, leg swelling and PND. Skin: Negative for itching and rash. Neurological: Negative for dizziness, tingling, tremors, sensory change, speech change, focal weakness, seizures, loss of consciousness, weakness and headaches. Results for orders placed or performed in visit on 01/06/20   VITAMIN D, 25 HYDROXY   Result Value Ref Range    VITAMIN D, 25-HYDROXY 52.4 30.0 - 578.1 ng/mL   METABOLIC PANEL, COMPREHENSIVE   Result Value Ref Range    Glucose 85 65 - 99 mg/dL    BUN 16 8 - 27 mg/dL    Creatinine 0.67 0.57 - 1.00 mg/dL    GFR est non-AA 94 >59 mL/min/1.73    GFR est  >59 mL/min/1.73    BUN/Creatinine ratio 24 12 - 28    Sodium 144 134 - 144 mmol/L    Potassium 4.2 3.5 - 5.2 mmol/L    Chloride 105 96 - 106 mmol/L    CO2 23 20 - 29 mmol/L    Calcium 9.3 8.7 - 10.3 mg/dL    Protein, total 6.7 6.0 - 8.5 g/dL    Albumin 4.4 3.6 - 4.8 g/dL    GLOBULIN, TOTAL 2.3 1.5 - 4.5 g/dL    A-G Ratio 1.9 1.2 - 2.2    Bilirubin, total 0.3 0.0 - 1.2 mg/dL    Alk. phosphatase 168 (H) 39 - 117 IU/L    AST (SGOT) 19 0 - 40 IU/L    ALT (SGPT) 22 0 - 32 IU/L         Physical Exam  Visit Vitals  /80   Pulse 68   Temp 97.8 °F (36.6 °C)   Resp 16   Ht 5' 2\" (1.575 m)   Wt 171 lb (77.6 kg)   SpO2 98%   BMI 31.28 kg/m²       Head: Normocephalic, without obvious abnormality, atraumatic  Eyes: conjunctivae/corneas clear. PERRL, EOM's intact. Neck: supple, symmetrical, trachea midline, no adenopathy, thyroid: not enlarged, symmetric, no tenderness/mass/nodules, no carotid bruit and no JVD  Lungs: clear to auscultation bilaterally  Heart: regular rate and rhythm, S1, S2 normal, no murmur, click, rub or gallop  Extremities: extremities normal, atraumatic, no cyanosis or edema  Pulses: 2+ and symmetric  Lymph nodes: Cervical, supraclavicular, and axillary nodes normal.  Neurologic: Grossly normal      ASSESSMENT and PLAN    ICD-10-CM ICD-9-CM    1.  Fibromyalgia X05.2 399.6 METABOLIC PANEL, COMPREHENSIVE      traMADol (ULTRAM) 50 mg tablet metoprolol tartrate (LOPRESSOR) 50 mg tablet   2. Arthritis M19.90 716.90 traMADol (ULTRAM) 50 mg tablet      celecoxib (CELEBREX) 200 mg capsule      metoprolol tartrate (LOPRESSOR) 50 mg tablet   3. History of migraine headaches Z86.69 V12.49 SUMAtriptan (IMITREX) 50 mg tablet   4. Gastroesophageal reflux disease with esophagitis K21.0 530.11 esomeprazole (NEXIUM) 40 mg capsule   5. Vitamin D deficiency E55.9 268.9 VITAMIN D, 25 HYDROXY   6. Osteopenia of lumbar spine M85.88 733.90    7. Spondylosis of lumbar region without myelopathy or radiculopathy M47.816 721.3    8. Ductal carcinoma in situ (DCIS) of right breast D05.11 233.0    9. Essential hypertension I10 401.9      Diagnoses and all orders for this visit:    1. Fibromyalgia  -     METABOLIC PANEL, COMPREHENSIVE  -     traMADol (ULTRAM) 50 mg tablet; Take 1 Tab by mouth every six (6) hours as needed for Pain for up to 30 days. Max Daily Amount: 200 mg. May refill no sooner than 30 days. -     metoprolol tartrate (LOPRESSOR) 50 mg tablet; TAKE 1 TABLET BY MOUTH TWICE DAILY    2. Arthritis  -     traMADol (ULTRAM) 50 mg tablet; Take 1 Tab by mouth every six (6) hours as needed for Pain for up to 30 days. Max Daily Amount: 200 mg. May refill no sooner than 30 days. -     celecoxib (CELEBREX) 200 mg capsule; Take 1 Cap by mouth two (2) times a day. -     metoprolol tartrate (LOPRESSOR) 50 mg tablet; TAKE 1 TABLET BY MOUTH TWICE DAILY    3. History of migraine headaches  -     SUMAtriptan (IMITREX) 50 mg tablet; TAKE 1 TABLET BY MOUTH ONCE AS NEEDED FOR MIGRAINE FOR UP TO ONE DOSE. MAY REPEAT IN 2 HOURS    4. Gastroesophageal reflux disease with esophagitis  -     esomeprazole (NEXIUM) 40 mg capsule; TAKE 1 CAPSULE BY MOUTH TWICE DAILY    5. Vitamin D deficiency  -     VITAMIN D, 25 HYDROXY    6. Osteopenia of lumbar spine    7. Spondylosis of lumbar region without myelopathy or radiculopathy    8. Ductal carcinoma in situ (DCIS) of right breast    9. Essential hypertension    Other orders  -     METABOLIC PANEL, COMPREHENSIVE      Follow-up and Dispositions    · Return in about 3 months (around 4/6/2020), or BP OOC, for 3 month F/U chronic controlled substance use, yearly for D,B12.       lab results and schedule of future lab studies reviewed with patient  reviewed diet, exercise and weight control  cardiovascular risk and specific lipid/LDL goals reviewed  reviewed medications and side effects in detail  Please call my office if there are any questions- [phone]. I will arrange for follow up after the lab tests done from today return  Recommended a weekly \"heart check. \" I went into detail how to do this. Call for refills on medications as needed. Discussed expected course/resolution/complications of diagnosis in detail with patient. Medication risks/benefits/costs/interactions/alternatives discussed with patient. Pt was given an after visit summary which includes diagnoses, current medications & vitals. Pt expressed understanding with the diagnosis and plan. Total 25 minutes,60 % counseling re: Reviewed symptoms, or lack thereof, of hypertension and elevated cholesterol. Review of  the proper technique of checking the blood pressure- check it on an average day only, not on a stressful day, sitting, no exercise for at least 1 hour and not experiencing any new pain( chronic pain is OK). Patient encouraged to check BP sitting and standing at least once a month and to report these readings to me if > 140/ 90 on average , or if the standing BP is >  15 points lower than the sitting. Always check it twice and if there is > 5 points decrease from the previous reading( top reading or systolic) keep checking it until it does not drop 5 points. Write only this final reading down, not the preceding readings.   If out of these readings there is only 1 out of 4 or less > 027, or > 90 diastolic then your blood pressure is OK; it needs further treatment if it is above this. Also, don't forget,  as noted above, to check your blood pressure standing once a month; this is to detect a drop in your BP that might lead to fainting and serious injury; you check it standing with your arm hanging straight down and relaxed. Check it twice waiting 1 minute between the two readings. If, with either one of these 2 readings there is a > 15 point drop of the systolic compared to your sitting pressure( done before the standing BP), then let me know. Following these guidelines, continue to check your BP and write down only the ones described above and it will help me to effectively treat your blood pressure. Recommended a weekly \"heart check. \" I went into detail how to do this. Regular exercise is very important to your health; it helps mentally, physically, socially; it prevents injuries if done properly. Exercise, even as simple as walking 20-30 minutes daily has major benefits to your health even though your \"numbers\" are the same in the lab. See if you can add this into your daily regimen and after a few months it will become a regular habit-\"just something you do,\" like brushing your teeth. A combination of aerobic exercise and strengthening and stretching is felt to be the best for you, so this should be your ultimate goal.   This can be done in the privacy of your home or in a group setting as at the gym  Some prefer having a , others prefer to do exercise in groups or individually. Do what \"works\" for you. You need to make it simple and \"fun,\" or you most likely will not continue it. BMI is significantly elevated- in the obese range. I reviewed diet, exercise and weight control.  Discussed weight control in detail, the importance of mainly decreased carbs, and for weight maintenance, exercise; discussed different diets and that it isn't as important to watch the type of foods as it is to decrease calorie intake no matter what type of diet you do, etc. Talked about aspirin and encouraged her to stop taking it as it is no longer indicated in patients like herself. Also, discussed symptoms of concern that were noted today in the note above, treatment options( including doing nothing), when to follow up before recommended time frame. Also, answered all questions. This document was written by Winston Juarez, as dictated by Ilia Taylor MD.  I have reviewed and agree with the above note and have made corrections where appropriate Wes Sanchez M.D.

## 2020-01-10 PROBLEM — E55.9 VITAMIN D DEFICIENCY: Status: ACTIVE | Noted: 2020-01-10

## 2020-01-10 PROBLEM — I10 ESSENTIAL HYPERTENSION: Status: ACTIVE | Noted: 2020-01-10

## 2020-03-18 DIAGNOSIS — M19.90 ARTHRITIS: ICD-10-CM

## 2020-03-19 RX ORDER — CELECOXIB 200 MG/1
CAPSULE ORAL
Qty: 180 CAP | Refills: 1 | Status: SHIPPED | OUTPATIENT
Start: 2020-03-19 | End: 2021-08-30

## 2020-07-07 ENCOUNTER — OFFICE VISIT (OUTPATIENT)
Dept: FAMILY MEDICINE CLINIC | Age: 65
End: 2020-07-07

## 2020-07-07 VITALS
HEIGHT: 62 IN | WEIGHT: 172 LBS | BODY MASS INDEX: 31.65 KG/M2 | TEMPERATURE: 98.2 F | HEART RATE: 62 BPM | DIASTOLIC BLOOD PRESSURE: 72 MMHG | OXYGEN SATURATION: 98 % | RESPIRATION RATE: 16 BRPM | SYSTOLIC BLOOD PRESSURE: 128 MMHG

## 2020-07-07 DIAGNOSIS — Z86.69 HISTORY OF MIGRAINE: ICD-10-CM

## 2020-07-07 DIAGNOSIS — K21.9 GASTROESOPHAGEAL REFLUX DISEASE WITHOUT ESOPHAGITIS: ICD-10-CM

## 2020-07-07 DIAGNOSIS — M85.88 OSTEOPENIA OF LUMBAR SPINE: ICD-10-CM

## 2020-07-07 DIAGNOSIS — M47.816 SPONDYLOSIS OF LUMBAR REGION WITHOUT MYELOPATHY OR RADICULOPATHY: ICD-10-CM

## 2020-07-07 DIAGNOSIS — K21.00 GASTROESOPHAGEAL REFLUX DISEASE WITH ESOPHAGITIS: ICD-10-CM

## 2020-07-07 DIAGNOSIS — D05.11 DUCTAL CARCINOMA IN SITU (DCIS) OF RIGHT BREAST: ICD-10-CM

## 2020-07-07 DIAGNOSIS — M79.7 FIBROMYALGIA: ICD-10-CM

## 2020-07-07 DIAGNOSIS — Z86.69 HISTORY OF MIGRAINE HEADACHES: ICD-10-CM

## 2020-07-07 DIAGNOSIS — M85.88 OSTEOPENIA OF SPINE: ICD-10-CM

## 2020-07-07 DIAGNOSIS — R60.0 PEDAL EDEMA: ICD-10-CM

## 2020-07-07 DIAGNOSIS — E55.9 VITAMIN D DEFICIENCY: ICD-10-CM

## 2020-07-07 DIAGNOSIS — I10 ESSENTIAL HYPERTENSION: Primary | ICD-10-CM

## 2020-07-07 DIAGNOSIS — K63.5 POLYP OF COLON, UNSPECIFIED PART OF COLON, UNSPECIFIED TYPE: ICD-10-CM

## 2020-07-07 NOTE — PROGRESS NOTES
Chief Complaint   Patient presents with    Hypertension    Leg Pain     lower legs bilateral   1. Have you been to the ER, urgent care clinic since your last visit? Hospitalized since your last visit? No    2. Have you seen or consulted any other health care providers outside of the [address] [place] [address] since your last visit? Include any pap smears or colon screening.  No

## 2020-07-07 NOTE — PROGRESS NOTES
HISTORY OF PRESENT ILLNESS  HPI  Neelima Norris is a 62 y.o. female with a history of migraine, GERD, colon polyp, osteopenia and osteoarthritis of spine, lumbar spondylosis, appendectomy, DCIS of breast and fibromyalgia, who presents to the office today for a f/u on her HTN and c/o bilaterally leg pain. Pt requests a refill for her medications. Pt reports that her leg pain started 6 months ago, but the pain would only occur 1-2 times a week. Pt affirms that this pain has now become a daily pain. She states that she walks for 3-5 miles three times a weeks. She notes that the pain begins within the first mile of walking. Pt remarks that the pain is worse in her left leg than her right leg. She points to the insides of her shins as the area of pain, adding that it radiates into the ankles. She says she has to slow down on her walks, providing little relief. Pt reports that it takes 30-45 minutes of sitting for the pain to dissipate. Pt confirms that once a week only her left leg would hurt. She adds that walking up 2 flights of steps cause pain, but not walking down steps. She denies FHx of this pain. Pt states that she is not a smoker. Pt denies unusual SOB, chest pain, and any recent ER visits or hospitalizations.              Past Medical History:   Diagnosis Date    Arthritis 2010    Cancer [facility])     right breast cancer    Colon polyp     DCIS (ductal carcinoma in situ) of breast 2010    right/surgery and radiation    Fibromyalgia 2010    GERD (gastroesophageal reflux disease) 2010    History of migraine 2018    History of migraine headaches 2017    Migraine 2010    Nausea & vomiting      Past Surgical History:   Procedure Laterality Date    COLONOSCOPY N/A 2016    COLONOSCOPY performed by Rut Bradley MD at [facility] COLONOSCOPY N/A 2019    COLONOSCOPY performed by Thai Spain MD at [address] [place] [address]       x2  HX APPENDECTOMY      HX MASTECTOMY  2008    right,then reconstruction/implant     Current Outpatient Medications on File Prior to Visit   Medication Sig Dispense Refill    celecoxib (CELEBREX) 200 mg capsule TAKE 1 CAPSULE BY MOUTH TWICE DAILY 180 Cap 1    SUMAtriptan (IMITREX) 50 mg tablet TAKE 1 TABLET BY MOUTH ONCE AS NEEDED FOR MIGRAINE FOR UP TO ONE DOSE. MAY REPEAT IN 2 HOURS 10 Tab 5    metoprolol tartrate (LOPRESSOR) 50 mg tablet TAKE 1 TABLET BY MOUTH TWICE DAILY 180 Tab 1    esomeprazole (NEXIUM) 40 mg capsule TAKE 1 CAPSULE BY MOUTH TWICE DAILY 180 Cap 2    cholecalciferol (VITAMIN D3) 2,000 unit cap capsule Take 2,000 Units by mouth daily.  cyanocobalamin (VITAMIN B-12) 1,000 mcg tablet Take 1,000 mcg by mouth daily.  cyclosporine (RESTASIS) 0.05 % ophthalmic emulsion Administer 1 Drop to both eyes two (2) times a day.  [DISCONTINUED] aspirin delayed-release 81 mg tablet Take 1 Tab by mouth daily. No current facility-administered medications on file prior to visit. Allergies   Allergen Reactions    Aspirin Nausea and Vomiting    Betadine [Povidone-Iodine] Rash    Nsaids (Non-Steroidal Anti-Inflammatory Drug) Nausea and Vomiting    Other Medication Nausea and Vomiting     General anes. .. - Pt does not know what this is.      Family History   Problem Relation Age of Onset    Coronary Artery Disease Mother     Hypertension Mother     Heart Disease Mother     Diabetes Mother     Pulmonary Embolism Mother     Coronary Artery Disease Father     Heart Disease Father     Hypertension Father     Kidney Disease Father     Thyroid Disease Father     Stroke Maternal Grandmother 76    Diabetes Maternal Grandfather 80    COPD Maternal Grandfather     Hypertension Paternal Grandmother 80    Coronary Artery Disease Paternal Grandfather 80    Hypertension Paternal Grandfather      Social History     Socioeconomic History    Marital status:      Spouse name: Not on file    Number of children: Not on file    Years of education: Not on file    Highest education level: Not on file   Tobacco Use    Smoking status: Never Smoker    Smokeless tobacco: Never Used   Substance and Sexual Activity    Alcohol use: No    Drug use: No    Sexual activity: Not Currently               Review of Systems   Constitutional: Negative for chills, diaphoresis, fever, malaise/fatigue and weight loss. Eyes: Negative for blurred vision, double vision, pain and redness. Respiratory: Negative for cough, shortness of breath and wheezing. Cardiovascular: Negative for chest pain, palpitations, orthopnea, claudication, leg swelling and PND. Skin: Negative for itching and rash. Neurological: Negative for dizziness, tingling, tremors, sensory change, speech change, focal weakness, seizures, loss of consciousness, weakness and headaches. Results for orders placed or performed in visit on 07/07/20   LIPID PANEL   Result Value Ref Range    Cholesterol, total 171 100 - 199 mg/dL    Triglyceride 147 0 - 149 mg/dL    HDL Cholesterol 49 >39 mg/dL    VLDL, calculated 29 5 - 40 mg/dL    LDL, calculated 93 0 - 99 mg/dL   METABOLIC PANEL, COMPREHENSIVE   Result Value Ref Range    Glucose 87 65 - 99 mg/dL    BUN 14 8 - 27 mg/dL    Creatinine 0.83 0.57 - 1.00 mg/dL    GFR est non-AA 75 >59 mL/min/1.73    GFR est AA 87 >59 mL/min/1.73    BUN/Creatinine ratio 17 12 - 28    Sodium 143 134 - 144 mmol/L    Potassium 4.6 3.5 - 5.2 mmol/L    Chloride 103 96 - 106 mmol/L    CO2 23 20 - 29 mmol/L    Calcium 9.6 8.7 - 10.3 mg/dL    Protein, total 6.8 6.0 - 8.5 g/dL    Albumin 4.5 3.8 - 4.8 g/dL    GLOBULIN, TOTAL 2.3 1.5 - 4.5 g/dL    A-G Ratio 2.0 1.2 - 2.2    Bilirubin, total 0.5 0.0 - 1.2 mg/dL    Alk.  phosphatase 165 (H) 39 - 117 IU/L    AST (SGOT) 25 0 - 40 IU/L    ALT (SGPT) 26 0 - 32 IU/L   CVD REPORT   Result Value Ref Range    INTERPRETATION Note                Physical Exam  Visit Vitals  BP 128/72   Pulse 62   Temp 98.2 °F (36.8 °C)   Resp 16   Ht 5' 2\" (1.575 m)   Wt 172 lb (78 kg)   SpO2 98%   BMI 31.46 kg/m²         Head: Normocephalic, without obvious abnormality, atraumatic  Eyes: conjunctivae/corneas clear. PERRL, EOM's intact. Neck: supple, symmetrical, trachea midline, no adenopathy, thyroid: not enlarged, symmetric, no tenderness/mass/nodules, no carotid bruit and no JVD  Lungs: clear to auscultation bilaterally  Heart: regular rate and rhythm, S1, S2 normal, no murmur, click, rub or gallop  Extremities: extremities normal, atraumatic, no cyanosis or edema. Pulses: Normal DP, but hard to feel PT pulses bilaterally  Lymph nodes: Cervical, supraclavicular, and axillary nodes normal.  Neurologic: Grossly normal            ASSESSMENT and PLAN    ICD-10-CM ICD-9-CM    1. Essential hypertension  I10 401.9 LIPID PANEL      METABOLIC PANEL, COMPREHENSIVE   2. Gastroesophageal reflux disease with esophagitis  K21.0 530.11    3. Spondylosis of lumbar region without myelopathy or radiculopathy  M47.816 721.3    4. Osteopenia of spine  M85.88 733.90    5. Vitamin D deficiency  E55.9 268.9    6. Pedal edema  R60.0 782.3    7. Polyp of colon, unspecified part of colon, unspecified type  K63.5 211.3    8. Fibromyalgia  M79.7 729.1    9. History of migraine headaches  Z86.69 V12.49    10. Ductal carcinoma in situ (DCIS) of right breast  D05.11 233.0    11. Gastroesophageal reflux disease without esophagitis  K21.9 530.81    12. History of migraine  Z86.69 V12.49    13. Osteopenia of lumbar spine  M85.88 733.90      Diagnoses and all orders for this visit:    1. Essential hypertension  -     LIPID PANEL  -     METABOLIC PANEL, COMPREHENSIVE    2. Gastroesophageal reflux disease with esophagitis    3. Spondylosis of lumbar region without myelopathy or radiculopathy    4. Osteopenia of spine    5. Vitamin D deficiency    6. Pedal edema    7. Polyp of colon, unspecified part of colon, unspecified type    8. Fibromyalgia    9. History of migraine headaches    10. Ductal carcinoma in situ (DCIS) of right breast    11. Gastroesophageal reflux disease without esophagitis    12. History of migraine    13. Osteopenia of lumbar spine    Other orders  -     CVD REPORT      Follow-up and Dispositions    · Return in about 6 months (around 1/7/2021), or increase in migraines, for migraines, f/u Vitamin D deficiency, osteoarthritis, GERD, F/U hypertension. lab results and schedule of future lab studies reviewed with patient  reviewed diet, exercise and weight control  cardiovascular risk and specific lipid/LDL goals reviewed  reviewed medications and side effects in detail  Please call my office if there are any questions- [phone]. I will arrange for follow up after the lab tests done from today return  Recommended a weekly \"heart check. \" I went into detail how to do this. Call for refills on medications as needed. Discussed expected course/resolution/complications of diagnosis in detail with patient. Medication risks/benefits/costs/interactions/alternatives discussed with patient. Pt was given an after visit summary which includes diagnoses, current medications & vitals. Pt expressed understanding with the diagnosis and plan          BMI is significantly elevated- in the obese range. I reviewed diet, exercise and weight control. Discussed weight control in detail, the importance of mainly decreased carbs, and for weight maintenance, exercise; discussed different diets and that it isn't as important to watch the type of foods as it is to decrease calorie intake no matter what type of diet you do, etc.     Total 25 minutes,60 % counseling re: Regular exercise is very important to your health; it helps mentally, physically, socially; it prevents injuries if done properly. Exercise, even as simple as walking 20-30 minutes daily has major benefits to your health even though your \"numbers\" are the same in the lab. See if you can add this into your daily regimen and after a few months it will become a regular habit-\"just something you do,\" like brushing your teeth. A combination of aerobic exercise and strengthening and stretching is felt to be the best for you, so this should be your ultimate goal.   This can be done in the privacy of your home or in a group setting as at the gym  Some prefer having a , others prefer to do exercise in groups or individually. Do what \"works\" for you. You need to make it simple and \"fun,\" or you most likely will not continue it. Recommended a weekly \"heart check. \" I went into detail how to do this. Reviewed symptoms, or lack thereof, of hypertension and elevated cholesterol. Review of  the proper technique of checking the blood pressure- check it on an average day only, not on a stressful day, sitting, no exercise for at least 1 hour and not experiencing any new pain( chronic pain is OK). Patient encouraged to check BP sitting and standing at least once a month and to report these readings to me if > 140/ 90 on average , or if the standing BP is >  15 points lower than the sitting. Always check it twice and if there is > 5 points decrease from the previous reading( top reading or systolic) keep checking it until it does not drop 5 points. Write only this final reading down, not the preceding readings. If out of these readings there is only 1 out of 4 or less > 578, or > 90 diastolic then your blood pressure is OK; it needs further treatment if it is above this. Also, don't forget,  as noted above, to check your blood pressure standing once a month; this is to detect a drop in your BP that might lead to fainting and serious injury; you check it standing with your arm hanging straight down and relaxed. Check it twice waiting 1 minute between the two readings.  If, with either one of these 2 readings there is a > 15 point drop of the systolic compared to your sitting pressure( done before the standing BP), then let me know. Following these guidelines, continue to check your BP and write down only the ones described above and it will help me to effectively treat your blood pressure. Also, discussed symptoms of concern that were noted today in the note above, treatment options( including doing nothing), when to follow up before recommended time frame. Also, answered all questions. I informed patient because I can feel her PT pulse this is not likely a circulation issue. It is more likely to be either a spinal cord compression issue or compartment syndrome. Because she walks a lot and does this for year, compartment syndrome is definitely a possibility. We will set her up for lower extremities dopplers at rest and after exercise and proceed from there depending on those results. In the meantime, no restriction in activity. This document was written by Eri Cagle, as dictated by Sarwat Bhagat MD.  I have reviewed and agree with the above note and have made corrections where appropriate Wes Cain M.D.

## 2020-07-08 LAB
ALBUMIN SERPL-MCNC: 4.5 G/DL (ref 3.8–4.8)
ALBUMIN/GLOB SERPL: 2 {RATIO} (ref 1.2–2.2)
ALP SERPL-CCNC: 165 IU/L (ref 39–117)
ALT SERPL-CCNC: 26 IU/L (ref 0–32)
AST SERPL-CCNC: 25 IU/L (ref 0–40)
BILIRUB SERPL-MCNC: 0.5 MG/DL (ref 0–1.2)
BUN SERPL-MCNC: 14 MG/DL (ref 8–27)
BUN/CREAT SERPL: 17 (ref 12–28)
CALCIUM SERPL-MCNC: 9.6 MG/DL (ref 8.7–10.3)
CHLORIDE SERPL-SCNC: 103 MMOL/L (ref 96–106)
CHOLEST SERPL-MCNC: 171 MG/DL (ref 100–199)
CO2 SERPL-SCNC: 23 MMOL/L (ref 20–29)
CREAT SERPL-MCNC: 0.83 MG/DL (ref 0.57–1)
GLOBULIN SER CALC-MCNC: 2.3 G/DL (ref 1.5–4.5)
GLUCOSE SERPL-MCNC: 87 MG/DL (ref 65–99)
HDLC SERPL-MCNC: 49 MG/DL
INTERPRETATION, 910389: NORMAL
LDLC SERPL CALC-MCNC: 93 MG/DL (ref 0–99)
POTASSIUM SERPL-SCNC: 4.6 MMOL/L (ref 3.5–5.2)
PROT SERPL-MCNC: 6.8 G/DL (ref 6–8.5)
SODIUM SERPL-SCNC: 143 MMOL/L (ref 134–144)
TRIGL SERPL-MCNC: 147 MG/DL (ref 0–149)
VLDLC SERPL CALC-MCNC: 29 MG/DL (ref 5–40)

## 2020-07-23 DIAGNOSIS — M19.90 ARTHRITIS: ICD-10-CM

## 2020-07-23 DIAGNOSIS — M79.7 FIBROMYALGIA: ICD-10-CM

## 2020-07-24 RX ORDER — METOPROLOL TARTRATE 50 MG/1
TABLET ORAL
Qty: 180 TAB | Refills: 1 | Status: SHIPPED | OUTPATIENT
Start: 2020-07-24 | End: 2021-01-07

## 2020-07-24 RX ORDER — TRAMADOL HYDROCHLORIDE 50 MG/1
TABLET ORAL
Qty: 90 TAB | Refills: 5 | Status: SHIPPED | OUTPATIENT
Start: 2020-07-24 | End: 2021-05-11

## 2020-10-09 DIAGNOSIS — K21.00 GASTROESOPHAGEAL REFLUX DISEASE WITH ESOPHAGITIS: ICD-10-CM

## 2020-10-10 RX ORDER — ESOMEPRAZOLE MAGNESIUM 40 MG/1
CAPSULE, DELAYED RELEASE ORAL
Qty: 180 CAP | Refills: 2 | Status: SHIPPED | OUTPATIENT
Start: 2020-10-10 | End: 2021-07-06

## 2020-10-30 ENCOUNTER — TELEPHONE (OUTPATIENT)
Dept: FAMILY MEDICINE CLINIC | Age: 65
End: 2020-10-30

## 2020-10-30 NOTE — TELEPHONE ENCOUNTER
----- Message from [employer] sent at 10/30/2020 10:42 AM EDT -----  Regarding: /Telephone  Caller's first and last name:self  Reason for call: pre-authorization    Callback required yes/no and why: yes  Best contact number(s): [phone]  Details to clarify the request: Pt. Requesting a Pre-Authorization for the generic Nexium-Omeprazole 40 mgs.  Pt. State that the Doctor can also call in the pre- authorizaton to [facility] [address] pre authorization at [phone]

## 2020-11-03 NOTE — TELEPHONE ENCOUNTER
Chief Complaint   Patient presents with    Pre-auth    Prior Auth     ESOMEPRAZOLE MAGNESIUM 40 MG ER CAPSULES :  APPROVAL:  10/03/2020 THROUGH 11/02/2021     Approval notification faxed to [place] at [phone] with confirmation received. Patient informed via [employer].   Ludmila Coats LPN

## 2020-12-30 ENCOUNTER — APPOINTMENT (OUTPATIENT)
Dept: CT IMAGING | Age: 65
End: 2020-12-30
Attending: EMERGENCY MEDICINE
Payer: COMMERCIAL

## 2020-12-30 ENCOUNTER — APPOINTMENT (OUTPATIENT)
Dept: MRI IMAGING | Age: 65
End: 2020-12-30
Attending: PSYCHIATRY & NEUROLOGY
Payer: COMMERCIAL

## 2020-12-30 ENCOUNTER — HOSPITAL ENCOUNTER (EMERGENCY)
Age: 65
Discharge: HOME | End: 2020-12-30
Attending: EMERGENCY MEDICINE
Payer: COMMERCIAL

## 2020-12-30 VITALS
SYSTOLIC BLOOD PRESSURE: 131 MMHG | OXYGEN SATURATION: 95 % | HEART RATE: 82 BPM | DIASTOLIC BLOOD PRESSURE: 86 MMHG | RESPIRATION RATE: 18 BRPM | TEMPERATURE: 96.7 F

## 2020-12-30 DIAGNOSIS — R51.9 INTRACTABLE HEADACHE, UNSPECIFIED CHRONICITY PATTERN, UNSPECIFIED HEADACHE TYPE: Primary | ICD-10-CM

## 2020-12-30 DIAGNOSIS — R42 DIZZINESS: ICD-10-CM

## 2020-12-30 LAB
ALBUMIN SERPL-MCNC: 3.6 G/DL (ref 3.5–5)
ALBUMIN/GLOB SERPL: 0.9 {RATIO} (ref 1.1–2.2)
ALP SERPL-CCNC: 184 U/L (ref 45–117)
ALT SERPL-CCNC: 35 U/L (ref 12–78)
ANION GAP SERPL CALC-SCNC: 5 MMOL/L (ref 5–15)
AST SERPL-CCNC: 20 U/L (ref 15–37)
ATRIAL RATE: 60 BPM
BASOPHILS # BLD: 0.1 K/UL (ref 0–0.1)
BASOPHILS NFR BLD: 1 % (ref 0–1)
BILIRUB SERPL-MCNC: 0.5 MG/DL (ref 0.2–1)
BUN SERPL-MCNC: 17 MG/DL (ref 6–20)
BUN/CREAT SERPL: 24 (ref 12–20)
CALCIUM SERPL-MCNC: 9 MG/DL (ref 8.5–10.1)
CALCULATED P AXIS, ECG09: 40 DEGREES
CALCULATED R AXIS, ECG10: 5 DEGREES
CALCULATED T AXIS, ECG11: 12 DEGREES
CHLORIDE SERPL-SCNC: 107 MMOL/L (ref 97–108)
CO2 SERPL-SCNC: 25 MMOL/L (ref 21–32)
COMMENT, HOLDF: NORMAL
CREAT SERPL-MCNC: 0.71 MG/DL (ref 0.55–1.02)
CRP SERPL-MCNC: 0.97 MG/DL (ref 0–0.6)
DIAGNOSIS, 93000: NORMAL
DIFFERENTIAL METHOD BLD: ABNORMAL
EOSINOPHIL # BLD: 0.1 K/UL (ref 0–0.4)
EOSINOPHIL NFR BLD: 2 % (ref 0–7)
ERYTHROCYTE [DISTWIDTH] IN BLOOD BY AUTOMATED COUNT: 13.5 % (ref 11.5–14.5)
ERYTHROCYTE [SEDIMENTATION RATE] IN BLOOD: 28 MM/HR (ref 0–30)
GLOBULIN SER CALC-MCNC: 3.8 G/DL (ref 2–4)
GLUCOSE SERPL-MCNC: 117 MG/DL (ref 65–100)
HCT VFR BLD AUTO: 41.5 % (ref 35–47)
HGB BLD-MCNC: 14 G/DL (ref 11.5–16)
IMM GRANULOCYTES # BLD AUTO: 0 K/UL (ref 0–0.04)
IMM GRANULOCYTES NFR BLD AUTO: 1 % (ref 0–0.5)
LYMPHOCYTES # BLD: 1.5 K/UL (ref 0.8–3.5)
LYMPHOCYTES NFR BLD: 17 % (ref 12–49)
MCH RBC QN AUTO: 30.4 PG (ref 26–34)
MCHC RBC AUTO-ENTMCNC: 33.7 G/DL (ref 30–36.5)
MCV RBC AUTO: 90.2 FL (ref 80–99)
MONOCYTES # BLD: 0.6 K/UL (ref 0–1)
MONOCYTES NFR BLD: 7 % (ref 5–13)
NEUTS SEG # BLD: 6.3 K/UL (ref 1.8–8)
NEUTS SEG NFR BLD: 72 % (ref 32–75)
NRBC # BLD: 0 K/UL (ref 0–0.01)
NRBC BLD-RTO: 0 PER 100 WBC
P-R INTERVAL, ECG05: 158 MS
PLATELET # BLD AUTO: 287 K/UL (ref 150–400)
PMV BLD AUTO: 9.4 FL (ref 8.9–12.9)
POTASSIUM SERPL-SCNC: 3.8 MMOL/L (ref 3.5–5.1)
PROT SERPL-MCNC: 7.4 G/DL (ref 6.4–8.2)
Q-T INTERVAL, ECG07: 408 MS
QRS DURATION, ECG06: 76 MS
QTC CALCULATION (BEZET), ECG08: 408 MS
RBC # BLD AUTO: 4.6 M/UL (ref 3.8–5.2)
SAMPLES BEING HELD,HOLD: NORMAL
SODIUM SERPL-SCNC: 137 MMOL/L (ref 136–145)
TROPONIN I SERPL-MCNC: <0.05 NG/ML
VENTRICULAR RATE, ECG03: 60 BPM
WBC # BLD AUTO: 8.6 K/UL (ref 3.6–11)

## 2020-12-30 PROCEDURE — 80053 COMPREHEN METABOLIC PANEL: CPT

## 2020-12-30 PROCEDURE — 74011250636 HC RX REV CODE- 250/636: Performed by: STUDENT IN AN ORGANIZED HEALTH CARE EDUCATION/TRAINING PROGRAM

## 2020-12-30 PROCEDURE — 36415 COLL VENOUS BLD VENIPUNCTURE: CPT

## 2020-12-30 PROCEDURE — 96374 THER/PROPH/DIAG INJ IV PUSH: CPT

## 2020-12-30 PROCEDURE — 96361 HYDRATE IV INFUSION ADD-ON: CPT

## 2020-12-30 PROCEDURE — 84484 ASSAY OF TROPONIN QUANT: CPT

## 2020-12-30 PROCEDURE — 93005 ELECTROCARDIOGRAM TRACING: CPT

## 2020-12-30 PROCEDURE — A9575 INJ GADOTERATE MEGLUMI 0.1ML: HCPCS | Performed by: STUDENT IN AN ORGANIZED HEALTH CARE EDUCATION/TRAINING PROGRAM

## 2020-12-30 PROCEDURE — 85652 RBC SED RATE AUTOMATED: CPT

## 2020-12-30 PROCEDURE — 70450 CT HEAD/BRAIN W/O DYE: CPT

## 2020-12-30 PROCEDURE — 70553 MRI BRAIN STEM W/O & W/DYE: CPT

## 2020-12-30 PROCEDURE — 96375 TX/PRO/DX INJ NEW DRUG ADDON: CPT

## 2020-12-30 PROCEDURE — 85025 COMPLETE CBC W/AUTO DIFF WBC: CPT

## 2020-12-30 PROCEDURE — 86140 C-REACTIVE PROTEIN: CPT

## 2020-12-30 PROCEDURE — 74011250636 HC RX REV CODE- 250/636: Performed by: EMERGENCY MEDICINE

## 2020-12-30 PROCEDURE — 99285 EMERGENCY DEPT VISIT HI MDM: CPT

## 2020-12-30 RX ORDER — LORAZEPAM 2 MG/ML
1 INJECTION INTRAMUSCULAR
Status: COMPLETED | OUTPATIENT
Start: 2020-12-30 | End: 2020-12-30

## 2020-12-30 RX ORDER — METOCLOPRAMIDE HYDROCHLORIDE 5 MG/ML
10 INJECTION INTRAMUSCULAR; INTRAVENOUS
Status: COMPLETED | OUTPATIENT
Start: 2020-12-30 | End: 2020-12-30

## 2020-12-30 RX ORDER — KETOROLAC TROMETHAMINE 30 MG/ML
15 INJECTION, SOLUTION INTRAMUSCULAR; INTRAVENOUS
Status: COMPLETED | OUTPATIENT
Start: 2020-12-30 | End: 2020-12-30

## 2020-12-30 RX ORDER — DIPHENHYDRAMINE HYDROCHLORIDE 50 MG/ML
25 INJECTION, SOLUTION INTRAMUSCULAR; INTRAVENOUS
Status: COMPLETED | OUTPATIENT
Start: 2020-12-30 | End: 2020-12-30

## 2020-12-30 RX ORDER — ONDANSETRON 2 MG/ML
8 INJECTION INTRAMUSCULAR; INTRAVENOUS
Status: COMPLETED | OUTPATIENT
Start: 2020-12-30 | End: 2020-12-30

## 2020-12-30 RX ORDER — GADOTERATE MEGLUMINE 376.9 MG/ML
15 INJECTION INTRAVENOUS
Status: COMPLETED | OUTPATIENT
Start: 2020-12-30 | End: 2020-12-30

## 2020-12-30 RX ORDER — DEXAMETHASONE SODIUM PHOSPHATE 10 MG/ML
6 INJECTION INTRAMUSCULAR; INTRAVENOUS ONCE
Status: COMPLETED | OUTPATIENT
Start: 2020-12-30 | End: 2020-12-30

## 2020-12-30 RX ORDER — SODIUM CHLORIDE 0.9 % (FLUSH) 0.9 %
10 SYRINGE (ML) INJECTION
Status: COMPLETED | OUTPATIENT
Start: 2020-12-30 | End: 2020-12-30

## 2020-12-30 RX ADMIN — DIPHENHYDRAMINE HYDROCHLORIDE 25 MG: 50 INJECTION, SOLUTION INTRAMUSCULAR; INTRAVENOUS at 14:55

## 2020-12-30 RX ADMIN — Medication 10 ML: at 17:48

## 2020-12-30 RX ADMIN — METOCLOPRAMIDE 10 MG: 5 INJECTION, SOLUTION INTRAMUSCULAR; INTRAVENOUS at 14:55

## 2020-12-30 RX ADMIN — DEXAMETHASONE SODIUM PHOSPHATE 6 MG: 10 INJECTION, SOLUTION INTRAMUSCULAR; INTRAVENOUS at 14:49

## 2020-12-30 RX ADMIN — KETOROLAC TROMETHAMINE 15 MG: 30 INJECTION, SOLUTION INTRAMUSCULAR at 14:53

## 2020-12-30 RX ADMIN — SODIUM CHLORIDE 1000 ML: 9 INJECTION, SOLUTION INTRAVENOUS at 14:45

## 2020-12-30 RX ADMIN — LORAZEPAM 1 MG: 2 INJECTION INTRAMUSCULAR; INTRAVENOUS at 16:21

## 2020-12-30 RX ADMIN — ONDANSETRON 8 MG: 2 INJECTION INTRAMUSCULAR; INTRAVENOUS at 14:46

## 2020-12-30 RX ADMIN — GADOTERATE MEGLUMINE 15 ML: 376.9 INJECTION INTRAVENOUS at 17:48

## 2020-12-30 NOTE — DISCHARGE INSTRUCTIONS
Continue your regular medications for. I have spoken with Dr. Milka Thorpe who is a neurologist.  If you have continued difficulty he may follow-up with her for further evaluation and treatment.

## 2020-12-30 NOTE — ED TRIAGE NOTES
Patient reports 3 days of headache and dizziness. Reports high blood pressure readings at home 148/88, 139/94. She is on blood pressure medication and has not missed any doses. PCP told her to come to ED for evaluation.

## 2020-12-30 NOTE — ED PROVIDER NOTES
This is a 78-year-old female with a history of migraine headaches, right breast cancer in the past and fibromyalgia. She states that over the last 3 days she has had a persistent headache from bilateral frontal region all the way back towards her neck. There is been no injury. She states she awakened several days ago with a severe headache and it has been unremitting since then. There has been some nausea but no vomiting. She denies any fever or chills and has had no cough or congestion, chest pain, abdominal pain or weakness or numbness in her arms or legs. She has taken Imitrex x2 without relief of symptoms and she is used extra strength Tylenol this morning without relief. Her primary physician told her to come to the ED today for further evaluation. The headache she states is nothing like her typical migraine headaches. She voices no other complaint.            Past Medical History:   Diagnosis Date    Arthritis 2010    Cancer [facility])     right breast cancer    Colon polyp     DCIS (ductal carcinoma in situ) of breast 2010    right/surgery and radiation    Fibromyalgia 2010    GERD (gastroesophageal reflux disease) 2010    History of migraine 2018    History of migraine headaches 2017    Migraine 2010    Nausea & vomiting        Past Surgical History:   Procedure Laterality Date    COLONOSCOPY N/A 2016    COL[facility] at [facility] ENDOSCOPY    COLONOSCOPY N/A 2019    COLONOSCOPY performed by Lexus Epstein MD at [address] [place] [address]       x2    HX APPENDECTOMY      HX MASTECTOMY      right,then reconstruction/implant         Family History:   Problem Relation Age of Onset    Coronary Artery Disease Mother     Hypertension Mother     Heart Disease Mother     Diabetes Mother     Pulmonary Embolism Mother     Coronary Artery Disease Father     Heart Disease Father     Hypertension Father     Kidney Disease Father     Thyroid Disease Father     Stroke Maternal Grandmother 76    Diabetes Maternal Grandfather 80    COPD Maternal Grandfather     Hypertension Paternal Grandmother 80    Coronary Artery Disease Paternal Grandfather 80    Hypertension Paternal Grandfather        Social History     Socioeconomic History    Marital status:      Spouse name: Not on file    Number of children: Not on file    Years of education: Not on file    Highest education level: Not on file   Occupational History    Not on file   Social Needs    Financial resource strain: Not on file    Food insecurity     Worry: Not on file     Inability: Not on file    Transportation needs     Medical: Not on file     Non-medical: Not on file   Tobacco Use    Smoking status: Never Smoker    Smokeless tobacco: Never Used   Substance and Sexual Activity    Alcohol use: No    Drug use: No    Sexual activity: Not Currently   Lifestyle    Physical activity     Days per week: Not on file     Minutes per session: Not on file    Stress: Not on file   Relationships    Social connections     Talks on phone: Not on file     Gets together: Not on file     Attends Jewish service: Not on file     Active member of club or organization: Not on file     Attends meetings of clubs or organizations: Not on file     Relationship status: Not on file    Intimate partner violence     Fear of current or ex partner: Not on file     Emotionally abused: Not on file     Physically abused: Not on file     Forced sexual activity: Not on file   Other Topics Concern    Not on file   Social History Narrative    Not on file         ALLERGIES: Aspirin, Betadine [povidone-iodine], Nsaids (non-steroidal anti-inflammatory drug), and Other medication    Review of Systems   Constitutional: Negative for activity change, appetite change, chills, fatigue and fever.    HENT: Negative for ear pain, facial swelling, sore throat and trouble swallowing. Eyes: Negative for pain, discharge and visual disturbance. Respiratory: Negative for chest tightness, shortness of breath and wheezing. Cardiovascular: Negative for chest pain and palpitations. Gastrointestinal: Positive for nausea. Negative for abdominal pain, blood in stool and vomiting. Genitourinary: Negative for difficulty urinating, flank pain and hematuria. Musculoskeletal: Negative for arthralgias, joint swelling, myalgias and neck pain. Skin: Negative for color change and rash. Neurological: Positive for headaches. Negative for dizziness, weakness and numbness. Hematological: Negative for adenopathy. Does not bruise/bleed easily. Psychiatric/Behavioral: Negative for behavioral problems, confusion and sleep disturbance. All other systems reviewed and are negative. Vitals:    12/30/20 1100   BP: (!) 153/53   Pulse: 65   Resp: 18   Temp: (!) 96.7 °F (35.9 °C)   SpO2: 97%            Physical Exam  Vitals signs and nursing note reviewed. Constitutional:       General: She is in acute distress ( Patient in mild to moderate distress with a headache from the frontal area of the head bilaterally to the occiput. ). Appearance: She is well-developed. Comments: Vital signs are as noted. HENT:      Head: Normocephalic and atraumatic. Nose: Nose normal.   Eyes:      General: No scleral icterus. Conjunctiva/sclera: Conjunctivae normal.      Pupils: Pupils are equal, round, and reactive to light. Neck:      Musculoskeletal: Normal range of motion and neck supple. Thyroid: No thyromegaly. Vascular: No JVD. Trachea: No tracheal deviation. Comments: No carotid bruits noted. The neck is completely supple. Straight leg raising is negative bilaterally. No focal tenderness of the head or neck is noted. Cardiovascular:      Rate and Rhythm: Normal rate and regular rhythm. Heart sounds: Normal heart sounds. No murmur. No friction rub.  No gallop. Pulmonary:      Effort: Pulmonary effort is normal. No respiratory distress. Breath sounds: Normal breath sounds. No wheezing or rales. Chest:      Chest wall: No tenderness. Abdominal:      General: Bowel sounds are normal. There is no distension. Palpations: Abdomen is soft. There is no mass. Tenderness: There is no abdominal tenderness. There is no guarding or rebound. Musculoskeletal: Normal range of motion. General: No tenderness. Lymphadenopathy:      Cervical: No cervical adenopathy. Skin:     General: Skin is warm and dry. Findings: No erythema or rash. Neurological:      Mental Status: She is alert and oriented to person, place, and time. Cranial Nerves: No cranial nerve deficit. Coordination: Coordination normal.      Deep Tendon Reflexes: Reflexes are normal and symmetric. Psychiatric:         Behavior: Behavior normal.         Thought Content: Thought content normal.         Judgment: Judgment normal.          MDM  Number of Diagnoses or Management Options     Amount and/or Complexity of Data Reviewed  Clinical lab tests: ordered and reviewed  Tests in the radiology section of CPT®: ordered and reviewed  Decide to obtain previous medical records or to obtain history from someone other than the patient: yes  Obtain history from someone other than the patient: yes  Review and summarize past medical records: yes  Discuss the patient with other providers: yes  Independent visualization of images, tracings, or specimens: yes    Risk of Complications, Morbidity, and/or Mortality  Presenting problems: high  Diagnostic procedures: high  Management options: high    Patient Progress  Patient progress: stable         Procedures  Will check labs and CT scan of the head. The patient's neck is completely supple and straight leg raising is negative bilaterally. Her symptoms are bilateral as well. The patient's lab and CT appear unremarkable.   When the patient went to CT just sitting up she was extremely dizzy. She had no nausea or vomiting but just felt extremely unstable and her headache was a bit worse. The patient will require additional evaluation with neurological consult and MRI. Given the severity of her dizziness, will ask the hospitalist to admit. He is not able to get up and walk around at this time. Perfect Serve Consult for Admission  1:55 PM    ED Room Number: ER06/06  Patient Name and age:  Laura Carmona 60 y.o.  female  Working Diagnosis:   1. Intractable headache, unspecified chronicity pattern, unspecified headache type    2. Dizziness        COVID-19 Suspicion:  yes  Sepsis present:  no  Reassessment needed: no  Code Status:  Full Code  Readmission: no  Isolation Requirements:  yes  Recommended Level of Care:  med/surg  Department:[facility] Adult ED - 21   Other:  Neuro consulted    I have spoken with Dr. Jenna Dudley on this patient. She has placed an order for an MRI and feels if this is negative the patient may be discharged with an atypical migraine. We have discussed medications and will provide that at this point pending MRI. Will reevaluate following medications and MRI. Again if the MRI is negative and the patient is improving, will be able to discharge home. 3:00 PM  Change of shift. Care of patient taken over from Dr. Zaira Sarkar; H&P reviewed, handoff complete. Awaiting labs/imaging/consultant. 7:00 PM  **PRELIMINARY REPORT**     Indication: Atypical migraine, vertigo.     EXAM: MRI brain with and without contrast.     COMPARISON: CT head earlier same day.     Preliminary findings: No acute or subacute ischemia or other definite acute  intracranial abnormality is confirmed. ? Mild T2 and FLAIR hyperintensity in both  temporal lobes medially, of uncertain if any significance.  Volumetric  T1-weighted images after contrast reveal? Mild prominence of meningeal thickness  over the clivus and upper cervical spine, of uncertain if any significance.     Preliminary report was provided by Dr. Gemini Robles, the on-call radiologist, at  [facility] 84 hours     Final report to follow.     **END PRELIMINARY REPORT**    I reevaluate the patient. Feeling better. Plan to discharge home with follow-up with Dr. Isabel Acosta.

## 2021-01-07 DIAGNOSIS — M19.90 ARTHRITIS: ICD-10-CM

## 2021-01-07 DIAGNOSIS — M79.7 FIBROMYALGIA: ICD-10-CM

## 2021-01-07 RX ORDER — METOPROLOL TARTRATE 50 MG/1
TABLET ORAL
Qty: 180 TAB | Refills: 0 | Status: SHIPPED | OUTPATIENT
Start: 2021-01-07 | End: 2021-04-06

## 2021-01-25 ENCOUNTER — OFFICE VISIT (OUTPATIENT)
Dept: NEUROLOGY | Age: 66
End: 2021-01-25
Payer: COMMERCIAL

## 2021-01-25 VITALS
RESPIRATION RATE: 18 BRPM | OXYGEN SATURATION: 98 % | DIASTOLIC BLOOD PRESSURE: 86 MMHG | HEART RATE: 81 BPM | SYSTOLIC BLOOD PRESSURE: 124 MMHG

## 2021-01-25 DIAGNOSIS — G43.009 MIGRAINE WITHOUT AURA AND WITHOUT STATUS MIGRAINOSUS, NOT INTRACTABLE: Primary | ICD-10-CM

## 2021-01-25 PROCEDURE — 99244 OFF/OP CNSLTJ NEW/EST MOD 40: CPT | Performed by: PSYCHIATRY & NEUROLOGY

## 2021-01-25 RX ORDER — NORTRIPTYLINE HYDROCHLORIDE 25 MG/1
25 CAPSULE ORAL
Qty: 30 CAP | Refills: 1 | Status: SHIPPED | OUTPATIENT
Start: 2021-01-25 | End: 2021-02-14 | Stop reason: SINTOL

## 2021-01-25 NOTE — PROGRESS NOTES
NEUROLOGY  NEW PATIENT EVALUATION/CONSULTATION       PATIENT NAME: Otilio Lynch    MRN: [id number]    REASON FOR CONSULTATION: Headaches    01/25/21      Previous records (physician notes, laboratory reports, and radiology reports) and imaging studies were reviewed and summarized. My recommendations will be communicated back to the patient's physician(s) via electronic medical record and/or by [address] mail. HISTORY OF PRESENT ILLNESS:  Otilio Lynch is a 60 y.o.  female presenting for evaluation of headaches. Headaches started during childhood with recent increasing frequency over the past 6-8 months. Location: L occipital with radiation to the frontal region  Character: throbbing  Intensity: On average 8/10  Frequency: 8-10x monthly  # HA free days per month: 15-20  Duration: 4+ hours  Aura: None  Associated Sx with HA: +nausea/vomiting, +phonophotophobia. Neurological ROS: Denies focal weakness, numbness or vision loss associated with headaches. +Dizziness/vertigo during recent ED visit with headache lasting 72h. Systemic ROS:   Caffeine use: 1-2 cups daily  H/O Head trauma: None  Depressive or anxiety Sx: None    Any change in pattern of HA? See above    Triggers: Unknown. 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 COLONOSCOPY N/A 2019    COLONOSCOPY performed by Cassy Thacker MD at [address] [facility]       x2    HX APPENDECTOMY      HX MASTECTOMY  2008    right,then reconstruction/implant       FAMILY HISTORY:   Family History   Problem Relation Age of Onset    Coronary Artery Disease Mother     Hypertension Mother     Heart Disease Mother     Diabetes Mother     Pulmonary Embolism Mother     Coronary Artery Disease Father     Heart Disease Father     Hypertension Father     Kidney Disease Father     Thyroid Disease Father     Stroke Maternal Grandmother 76    Diabetes Maternal Grandfather 80    COPD Maternal Grandfather     Hypertension Paternal Grandmother 80    Coronary Artery Disease Paternal Grandfather 80    Hypertension Paternal Grandfather          SOCIAL HISTORY:  Social History     Socioeconomic History    Marital status:      Spouse name: Not on file    Number of children: Not on file    Years of education: Not on file    Highest education level: Not on file   Tobacco Use    Smoking status: Never Smoker    Smokeless tobacco: Never Used   Substance and Sexual Activity    Alcohol use: No    Drug use: No    Sexual activity: Not Currently         MEDICATIONS:   Current Outpatient Medications   Medication Sig Dispense Refill    metoprolol tartrate (LOPRESSOR) 50 mg tablet TAKE 1 TABLET BY MOUTH TWICE DAILY 180 Tab 0    esomeprazole (NEXIUM) 40 mg capsule TAKE ONE CAPSULE BY MOUTH TWICE DAILY 180 Cap 2    celecoxib (CELEBREX) 200 mg capsule TAKE 1 CAPSULE BY MOUTH TWICE DAILY 180 Cap 1    SUMAtriptan (IMITREX) 50 mg tablet TAKE 1 TABLET BY MOUTH ONCE AS NEEDED FOR MIGRAINE FOR UP TO ONE DOSE. MAY REPEAT IN 2 HOURS 10 Tab 5    cholecalciferol (VITAMIN D3) 2,000 unit cap capsule Take 2,000 Units by mouth daily.  cyanocobalamin (VITAMIN B-12) 1,000 mcg tablet Take 1,000 mcg by mouth daily.  cyclosporine (RESTASIS) 0.05 % ophthalmic emulsion Administer 1 Drop to both eyes two (2) times a day. ALLERGIES:  Allergies   Allergen Reactions    Aspirin Nausea and Vomiting    Betadine [Povidone-Iodine] Rash    Nsaids (Non-Steroidal Anti-Inflammatory Drug) Nausea and Vomiting    Other Medication Nausea and Vomiting     General anes. .. - Pt does not know what this is. REVIEW OF SYSTEMS:  10 point ROS reviewed with patient. Please see scanned document under media. PHYSICAL EXAM:  Vital Signs:   Visit Vitals  /86   Pulse 81   Resp 18   SpO2 98%        General Medical Exam:  General:  Well appearing, comfortable, in no apparent distress. Eyes/ENT: see cranial nerve examination. Neck: No masses appreciated. Full range of motion without tenderness. Respiratory:  Clear to auscultation, good air entry bilaterally. Cardiac:  Regular rate and rhythm, no murmur. GI:  Soft, non-tender, non-distended abdomen. Bowel sounds normal. No masses, organomegaly. Extremities:  No deformities, edema, or skin discoloration. Skin:  No rashes or lesions. Neurological:  · Mental Status:  Alert and oriented to person, place, and time with fluent speech. · Cranial Nerves:   CNII/III/IV/VI: Optic disc w/clear margins b/l, visual fields full to confrontation, EOMI, PERRL, no ptosis or nystagmus. CN V: Facial sensation intact bilaterally, masseter 5/5   CN VII: Facial muscles symmetric and strong   CN VIII: Hears finger rub well bilaterally, intact vestibular function   CN IX/X: Normal palatal movement   CN XI: Full strength shoulder shrug bilaterally   CN XII: Tongue protrusion full and midline without fasciculation or atrophy  · Motor: Normal tone and muscle bulk with no pronator drift.  No atrophy or fasciculations present on examination. Individual muscle group testing:  Shoulder abduction:   Left:5/5   Right : 5/5    Shoulder adduction:   Left:5/5   Right : 5/5    Elbow flexion:      Left:5/5   Right : 5/5  Elbow extension:    Left:5/5   Right : 5/5   Wrist flexion:    Left:5/5   Right : 5/5  Wrist extension:    Left:5/5   Right : 5/5  Arm pronation:   Left:5/5   Right : 5/5  Arm supination:   Left:5/5   Right : 5/5    Finger flexion:    Left:5/5   Right : 5/5    Finger extension:   Left:5/5   Right : 5/5   Finger abduction:  Left:5/5   Right : 5/5   Finger adduction:   Left:5/5   Right : 5/5  Hip flexion:     Left:5/5   Right : 5/5         Hip extension:   Left:5/5   Right : 5/5    Knee flexion:    Left:5/5   Right : 5/5    Knee extension:   Left:5/5   Right : 5/5    Dorsiflexion:     Left:5/5   Right : 5/5  Plantar flexion:    Left:5/5   Right : 5/5      · MSRs: No crossed adductors or clonus. RIGHT  LEFT   Brachioradialis 2+ 2+   Biceps 2+ 2+   Triceps 2+ 2+   Knee 2+ 2+   Achilles 2+ 2+        Plantar response Downward Downward          · Sensation: Normal and symmetric perception of pinprick, temperature, light touch, proprioception, and vibration; (-) Romberg. · Coordination: No dysmetria. Normal rapid alternating movements; finger-to-nose and heel-to- shin testing are within normal limits. · Gait: Normal native gait    PERTINENT DATA:  INTERNAL RECORDS:  The patient's electronic medical record was reviewed. The relevant details include:    CT Results (maximum last 3): Results from [place] encounter on 12/30/20   CT HEAD WO CONT    Narrative EXAMINATION:  CT HEAD WO CONT    CLINICAL INFORMATION:  Severe headache and dizziness  COMPARISON:  None. TECHNIQUE: Routine axial head CT was performed. IV contrast was not  administered. Sagittal and coronal reconstructions were generated.     CT dose reduction was achieved through use of a standardized protocol tailored  for this examination and automatic exposure control for dose modulation. FINDINGS:  No acute infarct, hemorrhage or mass. VENTRICULAR SYSTEM:  Normal for age. BASAL CISTERNS:  Patent. BRAIN PARENCHYMA:  No significant abnormalities. MIDLINE SHIFT:  None. CALVARIUM/ SKULL BASE: Intact. PARANASAL SINUSES AND MASTOID AIR CELLS: Clear. VISUALIZED ORBITS: No significant abnormalities. SELLA: No enlargement. Impression IMPRESSION:  No significant abnormalities. MRI Results (maximum last 3): Results from [place] encounter on 12/30/20   MRI BRAIN W WO CONT    Narrative *PRELIMINARY REPORT*    Indication: Atypical migraine, vertigo. EXAM: MRI brain with and without contrast.    COMPARISON: CT head earlier same day. Preliminary findings: No acute or subacute ischemia or other definite acute  intracranial abnormality is confirmed. ? Mild T2 and FLAIR hyperintensity in both  temporal lobes medially, of uncertain if any significance. Volumetric  T1-weighted images after contrast reveal? Mild prominence of meningeal thickness  over the clivus and upper cervical spine, of uncertain if any significance. Preliminary report was provided by Dr. Harjeet Paige, the on-call radiologist, at  [address] 84 hours    Final report to follow. *END PRELIMINARY REPORT*    EXAM:  MRI BRAIN W WO CONT    INDICATION:    atypical migraine w/vertigo    COMPARISON:  CT head 12/30/2020. CONTRAST: 15 ml Dotarem. TECHNIQUE:    Multiplanar multisequence acquisition without and with contrast of the brain. FINDINGS:  The ventricles are normal in size and position. The brain parenchyma has normal  signal characteristics for age. There is no acute infarct, hemorrhage,  extra-axial fluid collection, or mass effect. There is no cerebellar tonsillar  herniation. Expected arterial flow-voids are present. No evidence of abnormal  enhancement. Incidental small right parietal developmental venous anomaly. The paranasal sinuses are clear. Trace left mastoid effusion. The orbital  contents are within normal limits. No significant osseous or scalp lesions are  identified. Impression IMPRESSION:   1. No acute or significant intracranial abnormality. Results from [facility] encounter on 10/02/20   MRI BREAST BI W WO CONT   Results from Abstract encounter on 07/17/18   MRI BREAST BI W WO CONT       ASSESSMENT:      ICD-10-CM ICD-9-CM    1. 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 MRI Brain WWO recently performed 12/30/20 following recent ED evaluation for severe headache with vertigo showing no acute intracranial pathology. Presentation sounds most consistent with vertiginous migraine variant. She is currently maintained on metoprolol for migraine prevention and HTN management. We discussed alternative options for migraine prophylaxis including TCA therapy and TPM. She elects to start a trial of Nortriptyline. Potential side effects were reviewed in detail including sedation/fatigue, dry eyes/mouth, constipation, cardiac conduction abnormalities. For rescue migraine treatment, will initiate Elex Likens due to recent ineffectiveness with sumatriptan. Headache education  Discussed pathophysiology of headache. Discussed treatment options, both abortive and preventive medications. Instructed patient about medications and potential side effects. Discussed medication overuse headache and to limit use of analgesics to less than 2 doses per week.      PLAN:  · Start Nortriptyline 25mg qhs for migraine prophylaxis   · D/C Imitrex, start Ubrelvy 50mg PRN for migraine rescue therapy      I have discussed the diagnosis with the patient today and the intended plan as seen in the above orders with both the patient as well as referring provider and/or PCP via electronic correspondence. The patient has received an after-visit summary and questions were answered concerning future plans. I have discussed medication side effects and warnings with the patient as well. Padmini Polanco, DO  Staff Neurologist  Ed 18, [address] [facility] [employer] of [employer] & Neu[employer]       CC Referring provider:    Tabatha Villeda MD

## 2021-01-25 NOTE — PATIENT INSTRUCTIONS
[address] [facility]   Statement to Patients  April 1, 2014      In an effort to ensure the large volume of patient prescription refills is processed in the most efficient and expeditious manner, we are asking our patients to assist us by calling your Pharmacy for all prescription refills, this will include also your  Mail Order Pharmacy. The pharmacy will contact our office electronically to continue the refill process. Please do not wait until the last minute to call your pharmacy. We need at least 48 hours (2days) to fill prescriptions. We also encourage you to call your pharmacy before going to  your prescription to make sure it is ready. With regard to controlled substance prescription refill requests (narcotic refills) that need to be picked up at our office, we ask your cooperation by providing us with at least 72 hours (3days) notice that you will need a refill. We will not refill narcotic prescription refill requests after 4:00pm on any weekday, Monday through Thursday, or after 2:00pm on Fridays, or on the weekends. We encourage everyone to explore another way of getting your prescription refill request processed using [employer], our patient web portal through our electronic medical record system. [employer] is an efficient and effective way to communicate your medication request directly to the office and  downloadable as an charlene on your smart phone . [employer] also features a review functionality that allows you to view your medication list as well as leave messages for your physician. Are you ready to get connected? If so please review the attatched instructions or speak to any of our staff to get you set up right away! Thank you so much for your cooperation. Should you have any questions please contact our Practice Administrator.     The Physicians and Staff,  [facility]

## 2021-01-26 ENCOUNTER — TELEPHONE (OUTPATIENT)
Dept: NEUROLOGY | Age: 66
End: 2021-01-26

## 2021-01-26 NOTE — TELEPHONE ENCOUNTER
Re: [place] [facility] [place]     Provider would like patient to use copay savings card to obtain medication. I called patient and left vm with this information and have faxed copay savings card info to [place].      Rocío Lee: [id number]  N #: [id number]  Card Number [id number]

## 2021-01-26 NOTE — TELEPHONE ENCOUNTER
Re: [place]     Medication is excluded from patient's plan. Alternatives include Nurtec and triptans. Patient has a commercial health plan and would be eligible to use copay savings card for Ubrelvy for 6 fills but then would need to switch to a covered alternative.

## 2021-02-03 ENCOUNTER — OFFICE VISIT (OUTPATIENT)
Dept: FAMILY MEDICINE CLINIC | Age: 66
End: 2021-02-03
Payer: COMMERCIAL

## 2021-02-03 VITALS
HEIGHT: 62 IN | DIASTOLIC BLOOD PRESSURE: 74 MMHG | WEIGHT: 175 LBS | HEART RATE: 81 BPM | TEMPERATURE: 97.8 F | RESPIRATION RATE: 16 BRPM | SYSTOLIC BLOOD PRESSURE: 132 MMHG | BODY MASS INDEX: 32.2 KG/M2 | OXYGEN SATURATION: 97 %

## 2021-02-03 DIAGNOSIS — Z86.69 HISTORY OF MIGRAINE HEADACHES: ICD-10-CM

## 2021-02-03 DIAGNOSIS — M19.90 ARTHRITIS: ICD-10-CM

## 2021-02-03 DIAGNOSIS — K21.00 GASTROESOPHAGEAL REFLUX DISEASE WITH ESOPHAGITIS WITHOUT HEMORRHAGE: ICD-10-CM

## 2021-02-03 DIAGNOSIS — I10 ESSENTIAL HYPERTENSION: Primary | ICD-10-CM

## 2021-02-03 DIAGNOSIS — M47.816 SPONDYLOSIS OF LUMBAR REGION WITHOUT MYELOPATHY OR RADICULOPATHY: ICD-10-CM

## 2021-02-03 DIAGNOSIS — K21.9 GASTROESOPHAGEAL REFLUX DISEASE WITHOUT ESOPHAGITIS: ICD-10-CM

## 2021-02-03 DIAGNOSIS — D05.11 DUCTAL CARCINOMA IN SITU (DCIS) OF RIGHT BREAST: ICD-10-CM

## 2021-02-03 DIAGNOSIS — M79.7 FIBROMYALGIA: ICD-10-CM

## 2021-02-03 DIAGNOSIS — M85.88 OSTEOPENIA OF LUMBAR SPINE: ICD-10-CM

## 2021-02-03 DIAGNOSIS — E55.9 VITAMIN D DEFICIENCY: ICD-10-CM

## 2021-02-03 LAB
25(OH)D3 SERPL-MCNC: 45.7 NG/ML (ref 30–100)
CHOLEST SERPL-MCNC: 164 MG/DL
HDLC SERPL-MCNC: 50 MG/DL
HDLC SERPL: 3.3 {RATIO} (ref 0–5)
LDLC SERPL CALC-MCNC: 85.2 MG/DL (ref 0–100)
LIPID PROFILE,FLP: NORMAL
TRIGL SERPL-MCNC: 144 MG/DL (ref ?–150)
VLDLC SERPL CALC-MCNC: 28.8 MG/DL

## 2021-02-03 PROCEDURE — 99214 OFFICE O/P EST MOD 30 MIN: CPT | Performed by: FAMILY MEDICINE

## 2021-02-03 NOTE — PROGRESS NOTES
Chief Complaint   Patient presents with    Hypertension   1. Have you been to the ER, urgent care clinic since your last visit? Hospitalized since your last visit? Yes Reason for visit: ER for [facility]    2. Have you seen or consulted any other health care providers outside of the [address] [place] [address] since your last visit? Include any pap smears or colon screening.  No

## 2021-02-03 NOTE — PROGRESS NOTES
HISTORY OF PRESENT ILLNESS  HPI  Mane Cortes a 60 y. o. female with a history of migraine, GERD, colon polyp, osteopenia and osteoarthritis of spine, lumbar spondylosis, appendectomy, DCIS of breast and fibromyalgia, who presents to the office today for f/u of these health problems. She is not fasting. Pt reports that she went to [facility] ER on 12/30/2020 for bilateral frontal HA that radiated down her back, which persisted for 3 days prior to ER visit. Pt adds that she was given some medicine, which she cannot remember the name of, for this with some relief. She notes that she was prescribed Pamelor 25 mg for this with little relief. She explains that she takes the Pamelor in the evenings, but will feel groggy and sleepy, even into the next day. Pt adds that she was prescribed Ubrelvy 50 mg for her migraine, but has not taken this as she has not had a migraine yet. Pt estimates that the Imitrex 50 mg that she takes will work for 3 out of 5 HA's but do not provide much relief. She states that she has never tried the 100 mg dose of Imitrex. Pt affirms that she checks her BP outside of the office with an average reading of 120/70. Pt remarks that she is taking 2000 units of vitamin D daily. Pt denies unusual SOB, chest pain, and any other ER visits or hospitalizations since 12/30/2020.          Past Medical History:   Diagnosis Date    Arthritis 12/20/2010    Cancer [facility]) 2008    right breast cancer    Colon polyp     DCIS (ductal carcinoma in situ) of breast 12/20/2010    right/surgery and radiation    Fibromyalgia 12/20/2010    GERD (gastroesophageal reflux disease) 12/20/2010    History of migraine 7/19/2018    History of migraine headaches 7/20/2017    Migraine 12/20/2010    Nausea & vomiting      Past Surgical History:   Procedure Laterality Date    COLONOSCOPY N/A 8/22/2016    COLONOSCOPY performed by Beth Philippe MD at [address] COLONOSCOPY N/A 11/5/2019    COLONOSCOPY performed by Cheri Bryan MD at [address] [facility]       x2    HX APPENDECTOMY      HX MASTECTOMY  2008    right,then reconstruction/implant     Current Outpatient Medications on File Prior to Visit   Medication Sig Dispense Refill    metoprolol tartrate (LOPRESSOR) 50 mg tablet TAKE 1 TABLET BY MOUTH TWICE DAILY 180 Tab 0    esomeprazole (NEXIUM) 40 mg capsule TAKE ONE CAPSULE BY MOUTH TWICE DAILY 180 Cap 2    celecoxib (CELEBREX) 200 mg capsule TAKE 1 CAPSULE BY MOUTH TWICE DAILY 180 Cap 1    SUMAtriptan (IMITREX) 50 mg tablet TAKE 1 TABLET BY MOUTH ONCE AS NEEDED FOR MIGRAINE FOR UP TO ONE DOSE. MAY REPEAT IN 2 HOURS 10 Tab 5    cholecalciferol (VITAMIN D3) 2,000 unit cap capsule Take 2,000 Units by mouth daily.  cyanocobalamin (VITAMIN B-12) 1,000 mcg tablet Take 1,000 mcg by mouth daily.  cyclosporine (RESTASIS) 0.05 % ophthalmic emulsion Administer 1 Drop to both eyes two (2) times a day.  [DISCONTINUED] nortriptyline (PAMELOR) 25 mg capsule Take 1 Cap by mouth nightly. 30 Cap 1     No current facility-administered medications on file prior to visit. Allergies   Allergen Reactions    Aspirin Nausea and Vomiting    Betadine [Povidone-Iodine] Rash    Nsaids (Non-Steroidal Anti-Inflammatory Drug) Nausea and Vomiting    Other Medication Nausea and Vomiting     General anes. .. - Pt does not know what this is.      Family History   Problem Relation Age of Onset    Coronary Artery Disease Mother     Hypertension Mother     Heart Disease Mother     Diabetes Mother     Pulmonary Embolism Mother     Coronary Artery Disease Father     Heart Disease Father     Hypertension Father     Kidney Disease Father     Thyroid Disease Father     Stroke Maternal Grandmother 76    Diabetes Maternal Grandfather 80    COPD Maternal Grandfather     Hypertension Paternal Grandmother 80    Coronary Artery Disease Paternal Grandfather 80    Hypertension Paternal Grandfather      Social History     Socioeconomic History    Marital status:      Spouse name: Not on file    Number of children: Not on file    Years of education: Not on file    Highest education level: Not on file   Tobacco Use    Smoking status: Never Smoker    Smokeless tobacco: Never Used   Substance and Sexual Activity    Alcohol use: No    Drug use: No    Sexual activity: Not Currently               Review of Systems   Constitutional: Negative for chills, diaphoresis, fever, malaise/fatigue and weight loss. Eyes: Negative for blurred vision, double vision, pain and redness. Respiratory: Negative for cough, shortness of breath and wheezing. Cardiovascular: Negative for chest pain, palpitations, orthopnea, claudication, leg swelling and PND. Skin: Negative for itching and rash. Neurological: Negative for dizziness, tingling, tremors, sensory change, speech change, focal weakness, seizures, loss of consciousness, weakness and headaches. Results for orders placed or performed in visit on 02/03/21   VITAMIN D, 25 HYDROXY   Result Value Ref Range    Vitamin D 25-Hydroxy 45.7 30 - 100 ng/mL   LIPID PANEL   Result Value Ref Range    LIPID PROFILE          Cholesterol, total 164 <200 MG/DL    Triglyceride 144 <150 MG/DL    HDL Cholesterol 50 MG/DL    LDL, calculated 85.2 0 - 100 MG/DL    VLDL, calculated 28.8 MG/DL    CHOL/HDL Ratio 3.3 0.0 - 5.0               Physical Exam  Visit Vitals  /74   Pulse 81   Temp 97.8 °F (36.6 °C)   Resp 16   Ht 5' 2\" (1.575 m)   Wt 175 lb (79.4 kg)   SpO2 97%   BMI 32.01 kg/m²         Head: Normocephalic, without obvious abnormality, atraumatic  Eyes: conjunctivae/corneas clear. PERRL, EOM's intact.    Neck: supple, symmetrical, trachea midline, no adenopathy, thyroid: not enlarged, symmetric, no tenderness/mass/nodules, no carotid bruit and no JVD  Lungs: clear to auscultation bilaterally  Heart: regular rate and rhythm, S1, S2 normal, no murmur, click, rub or gallop  Extremities: extremities normal, atraumatic, no cyanosis or edema  Pulses: 2+ and symmetric  Lymph nodes: Cervical, supraclavicular, and axillary nodes normal.  Neurologic: Grossly normal          ASSESSMENT and PLAN    ICD-10-CM ICD-9-CM    1. Essential hypertension  I10 401.9 LIPID PANEL      LIPID PANEL      CANCELED: METABOLIC PANEL, COMPREHENSIVE   2. Gastroesophageal reflux disease with esophagitis without hemorrhage  K21.00 530.81      530.10    3. Spondylosis of lumbar region without myelopathy or radiculopathy  M47.816 721.3    4. History of migraine headaches  Z86.69 V12.49    5. Ductal carcinoma in situ (DCIS) of right breast  D05.11 233.0    6. Gastroesophageal reflux disease without esophagitis  K21.9 530.81    7. Vitamin D deficiency  E55.9 268.9 VITAMIN D, 25 HYDROXY      VITAMIN D, 25 HYDROXY   8. Osteopenia of lumbar spine  M85.88 733.90    9. Fibromyalgia  M79.7 729.1    10. Arthritis  M19.90 716.90      Diagnoses and all orders for this visit:    1. Essential hypertension  -     LIPID PANEL; Future    2. Gastroesophageal reflux disease with esophagitis without hemorrhage    3. Spondylosis of lumbar region without myelopathy or radiculopathy    4. History of migraine headaches    5. Ductal carcinoma in situ (DCIS) of right breast    6. Gastroesophageal reflux disease without esophagitis    7. Vitamin D deficiency  -     VITAMIN D, 25 HYDROXY; Future    8. Osteopenia of lumbar spine    9. Fibromyalgia    10. Arthritis      Follow-up and Dispositions    · Return for F/U hypertension, GERD, osteoarthritis, f/u Vitamin D deficiency, migraines. lab results and schedule of future lab studies reviewed with patient  reviewed diet, exercise and weight control  cardiovascular risk and specific lipid/LDL goals reviewed  reviewed medications and side effects in detail  Please call my office if there are any questions- [phone].   I will arrange for follow up after the lab tests done from today return  Recommended a weekly \"heart check. \" I went into detail how to do this. Call for refills on medications as needed. Discussed expected course/resolution/complications of diagnosis in detail with patient. Medication risks/benefits/costs/interactions/alternatives discussed with patient. Pt was given an after visit summary which includes diagnoses, current medications & vitals. Pt expressed understanding with the diagnosis and plan      BMI is significantly elevated- in the obese range. I reviewed diet, exercise and weight control. Discussed weight control in detail, the importance of mainly decreased carbs, and for weight maintenance, exercise; discussed different diets and that it isn't as important to watch the type of foods as it is to decrease calorie intake no matter what type of diet you do, etc.     Total 30 minutes  re: Review of  the proper technique of checking the blood pressure- check it on an average day only, not on a stressful day, sitting, no exercise for at least 1 hour and not experiencing any new pain( chronic pain is OK). Patient encouraged to check BP sitting and standing at least once a month and to report these readings to me if > 140/ 90 on average , or if the standing BP is >  15 points lower than the sitting. Always check it twice and if there is > 5 points decrease from the previous reading( top reading or systolic) keep checking it until it does not drop 5 points. Write only this final reading down, not the preceding readings. If out of these readings there is only 1 out of 4 or less > 586, or > 90 diastolic then your blood pressure is OK; it needs further treatment if it is above this. Also, don't forget,  as noted above, to check your blood pressure standing once a month; this is to detect a drop in your BP that might lead to fainting and serious injury; you check it standing with your arm hanging straight down and relaxed.  Check it twice waiting 1 minute between the two readings. If, with either one of these 2 readings there is a > 15 point drop of the systolic compared to your sitting pressure( done before the standing BP), then let me know. Following these guidelines, continue to check your BP and write down only the ones described above and it will help me to effectively treat your blood pressure. Reviewed symptoms, or lack thereof, of hypertension and elevated cholesterol. Regular exercise is very important to your health; it helps mentally, physically, socially; it prevents injuries if done properly. Exercise, even as simple as walking 20-30 minutes daily has major benefits to your health even though your \"numbers\" are the same in the lab. See if you can add this into your daily regimen and after a few months it will become a regular habit-\"just something you do,\" like brushing your teeth. A combination of aerobic exercise and strengthening and stretching is felt to be the best for you, so this should be your ultimate goal.   This can be done in the privacy of your home or in a group setting as at the gym  Some prefer having a , others prefer to do exercise in groups or individually. Do what \"works\" for you. You need to make it simple and \"fun,\" or you most likely will not continue it. Recommended a weekly \"heart check. \" I went into detail how to do this. Also, discussed symptoms of concern that were noted today in the note above, treatment options( including doing nothing), when to follow up before recommended time frame. Also, answered all questions. Long discussion re: Covid 19 infection, prevention, treatment limitations, importance of masks and distancing, and the upcoming vaccines. At this point there are no plans to give it in the office- pull up the [address] on your computer and go to Covid 19 and update everyday. They are directng where it is to be given.  Presently it is being given at all of the hospitals in non patient areas- classrooms, etc. It is also going to be given at the [facility] and is presently beng given at the [facility]. When it is time for you to get your vaccine( based mainly on age and possibly on health risk), they will have it all over the news as well as their website as where to go. We will probably send out an email when it becomes time for us to start giving it- I don't see that happening until into April or later. I would get whatever vaccine is available; the Dakota Mann and Mira Back are very similar in efficacy and side effects. I encouraged her to stop her nortriptyline and ask Dr. Les Sotelo (neurology) if she could try something else in place of it. I talked to her about trying Imitrex versus her Ubrelvy. She can try increasing the Imitrex as an option for treating the migraine    I talked to her about using a statin to reduce her cardiovascular risk. Her CMP was done in December in the ER and it was normal. We could start the statin if she is agreeable after we get her lab from today. This document was written by Al Gabriel, as dictated by Magnus Romero MD.  I have reviewed and agree with the above note and have made corrections where appropriate Wes Jo M.D.

## 2021-02-05 ENCOUNTER — TELEPHONE (OUTPATIENT)
Dept: NEUROLOGY | Age: 66
End: 2021-02-05

## 2021-02-05 NOTE — TELEPHONE ENCOUNTER
Pt calling in regards to nortriptyline, stated she takes it at night and she is still groggy and tired all day the next day, even into the afternoon. Pt stated Dr Sary Perez wanted her to check in and update on medication.

## 2021-02-05 NOTE — TELEPHONE ENCOUNTER
Spoke with patient, she states the nortriptyline is making her very groggy the following day. She has taken medication as early as 6pm with the same results.

## 2021-02-10 RX ORDER — NORTRIPTYLINE HYDROCHLORIDE 10 MG/1
10 CAPSULE ORAL
Qty: 30 CAP | Refills: 0 | Status: SHIPPED | OUTPATIENT
Start: 2021-02-10 | End: 2021-03-04 | Stop reason: SDUPTHER

## 2021-02-10 NOTE — TELEPHONE ENCOUNTER
Per -provide 30 day supply of Nortriptyline 10mg qhs due to side effects on 25mg dosing. Patient aware. She would like to try the 10mg.

## 2021-03-04 ENCOUNTER — OFFICE VISIT (OUTPATIENT)
Dept: NEUROLOGY | Age: 66
End: 2021-03-04
Payer: COMMERCIAL

## 2021-03-04 VITALS
SYSTOLIC BLOOD PRESSURE: 126 MMHG | DIASTOLIC BLOOD PRESSURE: 86 MMHG | HEART RATE: 80 BPM | OXYGEN SATURATION: 98 % | RESPIRATION RATE: 18 BRPM

## 2021-03-04 DIAGNOSIS — G43.009 MIGRAINE WITHOUT AURA AND WITHOUT STATUS MIGRAINOSUS, NOT INTRACTABLE: Primary | ICD-10-CM

## 2021-03-04 DIAGNOSIS — T88.7XXA MEDICATION SIDE EFFECT: ICD-10-CM

## 2021-03-04 PROCEDURE — 99214 OFFICE O/P EST MOD 30 MIN: CPT | Performed by: PSYCHIATRY & NEUROLOGY

## 2021-03-04 RX ORDER — NORTRIPTYLINE HYDROCHLORIDE 10 MG/1
10 CAPSULE ORAL
Qty: 90 CAP | Refills: 1 | Status: SHIPPED | OUTPATIENT
Start: 2021-03-04 | End: 2021-08-04

## 2021-03-04 NOTE — PATIENT INSTRUCTIONS
[facility]   Statement to Patients  April 1, 2014      In an effort to ensure the large volume of patient prescription refills is processed in the most efficient and expeditious manner, we are asking our patients to assist us by calling your Pharmacy for all prescription refills, this will include also your  Mail Order Pharmacy. The pharmacy will contact our office electronically to continue the refill process. Please do not wait until the last minute to call your pharmacy. We need at least 48 hours (2days) to fill prescriptions. We also encourage you to call your pharmacy before going to  your prescription to make sure it is ready. With regard to controlled substance prescription refill requests (narcotic refills) that need to be picked up at our office, we ask your cooperation by providing us with at least 72 hours (3days) notice that you will need a refill. We will not refill narcotic prescription refill requests after 4:00pm on any weekday, Monday through Thursday, or after 2:00pm on Fridays, or on the weekends. We encourage everyone to explore another way of getting your prescription refill request processed using [employer], our patient web portal through our electronic medical record system. [employer] is an efficient and effective way to communicate your medication request directly to the office and  downloadable as an charlene on your smart phone . [employer] also features a review functionality that allows you to view your medication list as well as leave messages for your physician. Are you ready to get connected? If so please review the attatched instructions or speak to any of our staff to get you set up right away! Thank you so much for your cooperation. Should you have any questions please contact our Practice Administrator.     The Physicians and Staff,  [facility]

## 2021-03-04 NOTE — PROGRESS NOTES
Neurology Clinic Follow up Note    Patient ID:  Suleiman Mckeon  [id number]  60 y.o.  1955      Ms. Bradley King is here for follow up today of  Chief Complaint   Patient presents with    Headache          Last Appointment With Me:  1/25/2021     Aashish Chang is presenting for evaluation of headaches. Headaches started during childhood with recent increasing frequency over the past 6-8 months. Location: L occipital with radiation to the frontal region  Character: throbbing  Intensity: On average 8/10  Frequency: 8-10x monthly  # HA free days per month: 15-20  Duration: 4+ hours  Aura: None  Associated Sx with HA: +nausea/vomiting, +phonophotophobia. Neurological ROS: Denies focal weakness, numbness or vision loss associated with headaches. +Dizziness/vertigo during recent ED visit with headache lasting 72h. Systemic ROS:   Caffeine use: 1-2 cups daily  H/O Head trauma: None  Depressive or anxiety Sx: None    Any change in pattern of HA? See above    Triggers: Unknown. Alleviating factors: Rest/sleep  FHx HA/migraine: Mother, sisters with migraines    Treatment so far: Imitrex PRN for rescue therapy-typically works well but not for her most recent severe headache, Tylenol PRN (2x weekly)  Prior preventatives: Metoprolol (started this >20 years ago)    Investigations so far: MRI Brain 12/30/2020 normal\"    Interval History:   Headaches are improved, occurring twice weekly on average. Duration is 2 hours typically, responding well to Ubrelvy PRN. She was unable to tolerate higher dosing of (25mg) Nortriptyline due to fatigue. She has reduced to 10mg qhs and appears to be tolerating this better. She still experiences daytime fatigue on occasion. PMHx/ PSHx/ FHx/ SHx:  Reviewed and unchanged previous visit.    Past Medical History:   Diagnosis Date    Arthritis 12/20/2010    Cancer [facility]) 2008    right breast cancer    Colon polyp     DCIS (ductal carcinoma in situ) of breast 12/20/2010    right/surgery and radiation    Fibromyalgia 12/20/2010    GERD (gastroesophageal reflux disease) 12/20/2010    History of migraine 7/19/2018    History of migraine headaches 7/20/2017    Migraine 12/20/2010    Nausea & vomiting          ROS:  Comprehensive review of systems negative except for as noted above. Objective:       Meds:  Current Outpatient Medications   Medication Sig Dispense Refill    ubrogepant (Ubrelvy) 50 mg tablet Take 50 mg by mouth once as needed for Migraine.  nortriptyline (PAMELOR) 10 mg capsule Take 1 Cap by mouth nightly. 30 Cap 0    metoprolol tartrate (LOPRESSOR) 50 mg tablet TAKE 1 TABLET BY MOUTH TWICE DAILY 180 Tab 0    esomeprazole (NEXIUM) 40 mg capsule TAKE ONE CAPSULE BY MOUTH TWICE DAILY 180 Cap 2    celecoxib (CELEBREX) 200 mg capsule TAKE 1 CAPSULE BY MOUTH TWICE DAILY 180 Cap 1    cholecalciferol (VITAMIN D3) 2,000 unit cap capsule Take 2,000 Units by mouth daily.  cyanocobalamin (VITAMIN B-12) 1,000 mcg tablet Take 1,000 mcg by mouth daily.  cyclosporine (RESTASIS) 0.05 % ophthalmic emulsion Administer 1 Drop to both eyes two (2) times a day. Exam:  Visit Vitals  /86   Pulse 80   Resp 18   SpO2 98%     NEUROLOGICAL EXAM:  General: Awake, alert, speech fluent  CN: PERRL, EOMI without nystagmus, VFF to confrontation, facial sensation and strength are normal and symmetric, hearing is intact to finger rub bilaterally, palate and tongue movements are intact and symmetric. Motor: Normal tone, bulk and strength bilaterally. Reflexes: 2/4 and symmetric, plantar stimulation is flexor. Coordination: FNF, SCOTT, HTS intact. Sensation: LT intact throughout. Gait: Normal-based and steady. Lab data was reviewed. Radiology images were independently viewed and available reports were reviewed.       LABS  Results for orders placed or performed in visit on 02/03/21   VITAMIN D, 25 HYDROXY   Result Value Ref Range    Vitamin D 25-Hydroxy 45.7 30 - 100 ng/mL   LIPID PANEL   Result Value Ref Range    LIPID PROFILE          Cholesterol, total 164 <200 MG/DL    Triglyceride 144 <150 MG/DL    HDL Cholesterol 50 MG/DL    LDL, calculated 85.2 0 - 100 MG/DL    VLDL, calculated 28.8 MG/DL    CHOL/HDL Ratio 3.3 0.0 - 5.0         IMAGING:  MRI Results (most recent):  Results from Hospital Encounter encounter on 12/30/20   MRI BRAIN W WO CONT    Narrative *PRELIMINARY REPORT*    Indication: Atypical migraine, vertigo. EXAM: MRI brain with and without contrast.    COMPARISON: CT head earlier same day. Preliminary findings: No acute or subacute ischemia or other definite acute  intracranial abnormality is confirmed. ? Mild T2 and FLAIR hyperintensity in both  temporal lobes medially, of uncertain if any significance. Volumetric  T1-weighted images after contrast reveal? Mild prominence of meningeal thickness  over the clivus and upper cervical spine, of uncertain if any significance. Preliminary report was provided by Dr. Dany Pratt, the on-call radiologist, at  [address]    Final report to follow. *END PRELIMINARY REPORT*    EXAM:  MRI BRAIN W WO CONT    INDICATION:    atypical migraine w/vertigo    COMPARISON:  CT head 12/30/2020. CONTRAST: 15 ml Dotarem. TECHNIQUE:    Multiplanar multisequence acquisition without and with contrast of the brain. FINDINGS:  The ventricles are normal in size and position. The brain parenchyma has normal  signal characteristics for age. There is no acute infarct, hemorrhage,  extra-axial fluid collection, or mass effect. There is no cerebellar tonsillar  herniation. Expected arterial flow-voids are present. No evidence of abnormal  enhancement. Incidental small right parietal developmental venous anomaly. The paranasal sinuses are clear. Trace left mastoid effusion. The orbital  contents are within normal limits. No significant osseous or scalp lesions are  identified. Impression IMPRESSION:   1.  No acute or significant intracranial abnormality. Assessment:     Encounter Diagnoses     ICD-10-CM ICD-9-CM   1. Migraine without aura and without status migrainosus, not intractable  G43.009 346.10   2. Medication side effect  T88. 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 MRI Brain WWO recently performed 12/30/20 following recent ED evaluation for severe headache with vertigo showing no acute intracranial pathology. Presentation sounds most consistent with vertiginous migraine variant. Migraines were not responsive to betablockers/metoprolol. She has noted a reduction in headache frequency after initiation of TCA therapy, but did not tolerate higher dosing due to fatigue. We did discuss treatment alternatives including CGRP inhibitors which she defers presently. She elects to continue with current lose dose Nortriptyline for preventative therapy. For rescue migraine treatment, will continue Ubrelvy due to prior ineffectiveness with triptans. Headache education  Discussed pathophysiology of headache. Discussed treatment options, both abortive and preventive medications. Instructed patient about medications and potential side effects. Discussed medication overuse headache and to limit use of analgesics to less than 2 doses per week. Plan:   · Continue Nortriptyline 10mg qhs for migraine prophylaxis   · May use Ubrelvy 50mg PRN for migraine rescue therapy    Follow-up and Dispositions    · Return in about 6 months (around 9/4/2021). I have discussed the diagnosis with the patient today and the intended plan as seen in the above orders with both the patient as well as referring provider and/or PCP via electronic correspondence. The patient has received an after-visit summary and questions were answered concerning future plans.  I have discussed medication side effects and warnings with the patient as well.       Signed:  Leeanna Rene DO  3/4/2021  11:28 AM

## 2021-03-08 ENCOUNTER — TELEPHONE (OUTPATIENT)
Dept: NEUROLOGY | Age: 66
End: 2021-03-08

## 2021-03-08 NOTE — TELEPHONE ENCOUNTER
----- Message from Ej Chacon sent at 3/8/2021 12:49 PM EST -----  Regarding: Dr. Alvarez Pass Message/Vendor Calls    Caller's first and last name: Pt      Reason for call: formulary exception form needed      Callback required yes/no and why: Yes, to notify pt once this has been completed and faxed off      Best contact number(s):[phone]      Details to clarify the request: Pt calling to ask Dr. Olive Boxer to fill out a formulary exception form located on [email or web address] for her to have her Collie Standing covered by her insurance. Please advise.        Ej Chacon

## 2021-03-08 NOTE — TELEPHONE ENCOUNTER
Re: Isidoro Medellin with [facility] went to patient's pharmacy to provide copay savings card which will work for 6 months without an authorization. A formulary exception would be very difficult at this time since patient has not tried Nurtec and we only have documentation of sumatriptan.       Dianne with [facility] stated that Sonia Kaplan has to order the medication and patient will be able to pick it up tomorrow after 2pm.

## 2021-04-06 DIAGNOSIS — M79.7 FIBROMYALGIA: ICD-10-CM

## 2021-04-06 DIAGNOSIS — M19.90 ARTHRITIS: ICD-10-CM

## 2021-04-06 RX ORDER — METOPROLOL TARTRATE 50 MG/1
TABLET ORAL
Qty: 180 TAB | Refills: 1 | Status: SHIPPED | OUTPATIENT
Start: 2021-04-06 | End: 2021-10-04

## 2021-05-10 DIAGNOSIS — M79.7 FIBROMYALGIA: ICD-10-CM

## 2021-05-10 DIAGNOSIS — M19.90 ARTHRITIS: ICD-10-CM

## 2021-05-11 RX ORDER — TRAMADOL HYDROCHLORIDE 50 MG/1
TABLET ORAL
Qty: 90 TAB | Refills: 1 | Status: SHIPPED | OUTPATIENT
Start: 2021-05-11 | End: 2021-08-04 | Stop reason: SDUPTHER

## 2021-07-06 DIAGNOSIS — K21.00 GASTROESOPHAGEAL REFLUX DISEASE WITH ESOPHAGITIS: ICD-10-CM

## 2021-07-06 RX ORDER — ESOMEPRAZOLE MAGNESIUM 40 MG/1
CAPSULE, DELAYED RELEASE ORAL
Qty: 180 CAPSULE | Refills: 2 | Status: SHIPPED | OUTPATIENT
Start: 2021-07-06 | End: 2022-02-01 | Stop reason: SDUPTHER

## 2021-08-04 ENCOUNTER — OFFICE VISIT (OUTPATIENT)
Dept: FAMILY MEDICINE CLINIC | Age: 66
End: 2021-08-04

## 2021-08-04 VITALS
WEIGHT: 174 LBS | RESPIRATION RATE: 16 BRPM | SYSTOLIC BLOOD PRESSURE: 120 MMHG | HEART RATE: 71 BPM | BODY MASS INDEX: 32.02 KG/M2 | HEIGHT: 62 IN | OXYGEN SATURATION: 98 % | DIASTOLIC BLOOD PRESSURE: 72 MMHG | TEMPERATURE: 98.2 F

## 2021-08-04 DIAGNOSIS — M79.7 FIBROMYALGIA: ICD-10-CM

## 2021-08-04 DIAGNOSIS — E55.9 VITAMIN D DEFICIENCY: ICD-10-CM

## 2021-08-04 DIAGNOSIS — M48.07 SPINAL STENOSIS OF LUMBOSACRAL REGION: ICD-10-CM

## 2021-08-04 DIAGNOSIS — M47.816 SPONDYLOSIS OF LUMBAR REGION WITHOUT MYELOPATHY OR RADICULOPATHY: ICD-10-CM

## 2021-08-04 DIAGNOSIS — M19.90 ARTHRITIS: ICD-10-CM

## 2021-08-04 DIAGNOSIS — K21.00 GASTROESOPHAGEAL REFLUX DISEASE WITH ESOPHAGITIS WITHOUT HEMORRHAGE: Primary | ICD-10-CM

## 2021-08-04 LAB
ALBUMIN SERPL-MCNC: 3.9 G/DL (ref 3.5–5)
ALBUMIN/GLOB SERPL: 1.3 {RATIO} (ref 1.1–2.2)
ALP SERPL-CCNC: 197 U/L (ref 45–117)
ALT SERPL-CCNC: 37 U/L (ref 12–78)
ANION GAP SERPL CALC-SCNC: 4 MMOL/L (ref 5–15)
AST SERPL-CCNC: 18 U/L (ref 15–37)
BILIRUB SERPL-MCNC: 0.5 MG/DL (ref 0.2–1)
BUN SERPL-MCNC: 11 MG/DL (ref 6–20)
BUN/CREAT SERPL: 16 (ref 12–20)
CALCIUM SERPL-MCNC: 9.4 MG/DL (ref 8.5–10.1)
CHLORIDE SERPL-SCNC: 110 MMOL/L (ref 97–108)
CO2 SERPL-SCNC: 26 MMOL/L (ref 21–32)
CREAT SERPL-MCNC: 0.67 MG/DL (ref 0.55–1.02)
GLOBULIN SER CALC-MCNC: 3 G/DL (ref 2–4)
GLUCOSE SERPL-MCNC: 120 MG/DL (ref 65–100)
POTASSIUM SERPL-SCNC: 4.2 MMOL/L (ref 3.5–5.1)
PROT SERPL-MCNC: 6.9 G/DL (ref 6.4–8.2)
SODIUM SERPL-SCNC: 140 MMOL/L (ref 136–145)

## 2021-08-04 PROCEDURE — 99214 OFFICE O/P EST MOD 30 MIN: CPT | Performed by: FAMILY MEDICINE

## 2021-08-04 RX ORDER — TRAMADOL HYDROCHLORIDE 50 MG/1
50 TABLET ORAL
Qty: 90 TABLET | Refills: 1 | Status: SHIPPED | OUTPATIENT
Start: 2021-08-04 | End: 2022-02-01 | Stop reason: SDUPTHER

## 2021-08-04 NOTE — PROGRESS NOTES
HISTORY OF PRESENT ILLNESS  HPI  Michelle Clock a 60 y. o. female with a history of migraine, GERD, colon polyp, osteopenia and osteoarthritis of spine, lumbar spondylosis, appendectomy, DCIS of breast and fibromyalgia, who presents to the office today for f/u of these health problems. Pt reports her legs hurt, cramp, and get a heavy feeling when she walks or goes up and down steps. She feels this comes on mores so when she has a change in elevation. This discomfort does not bother her sitting in chair. Pain is relieved when she stops walking, taking about 10-15 minutes for pain to get better. If she gets up and starts walking the pain returns within a shorter distance of walking. The discomfort does not comes on when she lays down. She describes her discomfort to start at the knees and go down to ankles on both sides. She estimates she has had this for a little over a year. Pt has no FHx of this pain. Pt is following up with Dr. Arlene Elmore (neurology) for her HA. Pt denies unusual SOB, chest pain, and any recent ER visits or hospitalizations.          Past Medical History:   Diagnosis Date    Arthritis 2010    Cancer [facility])     right breast cancer    Colon polyp     DCIS (ductal carcinoma in situ) of breast 2010    right/surgery and radiation    Fibromyalgia 2010    GERD (gastroesophageal reflux disease) 2010    History of migraine 2018    History of migraine headaches 2017    Migraine 2010    Nausea & vomiting      Past Surgical History:   Procedure Laterality Date    COLONOSCOPY N/A 2016    COLONOSCOPY performed by Rex Parish MD at [address] COLONOSCOPY N/A 2019    COLONOSCOPY performed by Bertha Shaver MD at [address] [place] [address]       x2    HX APPENDECTOMY      HX MASTECTOMY      right,then reconstruction/implant     Current Outpatient Medications on File Prior to Visit   Medication Sig Dispense Refill  esomeprazole (NEXIUM) 40 mg capsule TAKE ONE CAPSULE BY MOUTH TWICE DAILY 180 Capsule 2    metoprolol tartrate (LOPRESSOR) 50 mg tablet TAKE 1 TABLET BY MOUTH TWICE DAILY 180 Tab 1    celecoxib (CELEBREX) 200 mg capsule TAKE 1 CAPSULE BY MOUTH TWICE DAILY 180 Cap 1    cholecalciferol (VITAMIN D3) 2,000 unit cap capsule Take 2,000 Units by mouth daily.  cyanocobalamin (VITAMIN B-12) 1,000 mcg tablet Take 1,000 mcg by mouth daily.  cyclosporine (RESTASIS) 0.05 % ophthalmic emulsion Administer 1 Drop to both eyes two (2) times a day. No current facility-administered medications on file prior to visit. Allergies   Allergen Reactions    Aspirin Nausea and Vomiting    Betadine [Povidone-Iodine] Rash    Nsaids (Non-Steroidal Anti-Inflammatory Drug) Nausea and Vomiting    Other Medication Nausea and Vomiting     General anes. .. - Pt does not know what this is.      Family History   Problem Relation Age of Onset    Coronary Artery Disease Mother     Hypertension Mother     Heart Disease Mother     Diabetes Mother     Pulmonary Embolism Mother     Coronary Artery Disease Father     Heart Disease Father     Hypertension Father     Kidney Disease Father     Thyroid Disease Father     Stroke Maternal Grandmother 76    Diabetes Maternal Grandfather 80    COPD Maternal Grandfather     Hypertension Paternal Grandmother 80    Coronary Artery Disease Paternal Grandfather 80    Hypertension Paternal Grandfather      Social History     Socioeconomic History    Marital status:      Spouse name: Not on file    Number of children: Not on file    Years of education: Not on file    Highest education level: Not on file   Tobacco Use    Smoking status: Never Smoker    Smokeless tobacco: Never Used   Vaping Use    Vaping Use: Never used   Substance and Sexual Activity    Alcohol use: No    Drug use: No    Sexual activity: Not Currently     Social Determinants of Health Financial Resource Strain:     Difficulty of Paying Living Expenses:    Food Insecurity:     Worried About Running Out of Food in the Last Year:     [address] in the Last Year:    Transportation Needs:     Lack of Transportation (Medical):  Lack of Transportation (Non-Medical):    Physical Activity:     Days of Exercise per Week:     Minutes of Exercise per Session:    Stress:     Feeling of Stress :    Social Connections:     Frequency of Communication with Friends and Family:     Frequency of Social Gatherings with Friends and Family:     Attends Sikh Services:     Active Member of Clubs or Organizations:     Attends Club or Organization Meetings:     Marital Status:                Review of Systems   Constitutional: Negative for chills, diaphoresis, fever, malaise/fatigue and weight loss. Eyes: Negative for blurred vision, double vision, pain and redness. Respiratory: Negative for cough, shortness of breath and wheezing. Cardiovascular: Negative for chest pain, palpitations, orthopnea, claudication, leg swelling and PND. Skin: Negative for itching and rash. Neurological: Negative for dizziness, tingling, tremors, sensory change, speech change, focal weakness, seizures, loss of consciousness, weakness and headaches. Results for orders placed or performed in visit on 44/92/53   METABOLIC PANEL, COMPREHENSIVE   Result Value Ref Range    Sodium 140 136 - 145 mmol/L    Potassium 4.2 3.5 - 5.1 mmol/L    Chloride 110 (H) 97 - 108 mmol/L    CO2 26 21 - 32 mmol/L    Anion gap 4 (L) 5 - 15 mmol/L    Glucose 120 (H) 65 - 100 mg/dL    BUN 11 6 - 20 MG/DL    Creatinine 0.67 0.55 - 1.02 MG/DL    BUN/Creatinine ratio 16 12 - 20      GFR est AA >60 >60 ml/min/1.73m2    GFR est non-AA >60 >60 ml/min/1.73m2    Calcium 9.4 8.5 - 10.1 MG/DL    Bilirubin, total 0.5 0.2 - 1.0 MG/DL    ALT (SGPT) 37 12 - 78 U/L    AST (SGOT) 18 15 - 37 U/L    Alk.  phosphatase 197 (H) 45 - 117 U/L Protein, total 6.9 6.4 - 8.2 g/dL    Albumin 3.9 3.5 - 5.0 g/dL    Globulin 3.0 2.0 - 4.0 g/dL    A-G Ratio 1.3 1.1 - 2.2               Physical Exam  Visit Vitals  /72 (BP 1 Location: Left arm, BP Patient Position: Sitting, BP Cuff Size: Adult)   Pulse 71   Temp 98.2 °F (36.8 °C)   Resp 16   Ht 5' 2\" (1.575 m)   Wt 174 lb (78.9 kg)   SpO2 98%   BMI 31.83 kg/m²           Head: Normocephalic, without obvious abnormality, atraumatic  Eyes: conjunctivae/corneas clear. PERRL, EOM's intact. Neck: supple, symmetrical, trachea midline, no adenopathy, thyroid: not enlarged, symmetric, no tenderness/mass/nodules, no carotid bruit and no JVD  Lungs: clear to auscultation bilaterally  Heart: regular rate and rhythm, S1, S2 normal, no murmur, click, rub or gallop  Extremities: extremities normal, atraumatic, no cyanosis or edema. 2+ dp and pt pulses bilaterally. Knees:  has crepitation bilaterally with flexing and extending the knee. Pulses: 2+ and symmetric  Lymph nodes: Cervical, supraclavicular, and axillary nodes normal.  Neurologic: Grossly normal      ASSESSMENT and PLAN    ICD-10-CM ICD-9-CM    1. Gastroesophageal reflux disease with esophagitis without hemorrhage  K21.00 530.81      530.10    2. Fibromyalgia  M79.7 729.1 traMADoL (ULTRAM) 50 mg tablet      METABOLIC PANEL, COMPREHENSIVE      METABOLIC PANEL, COMPREHENSIVE   3. Arthritis  M19.90 716.90 traMADoL (ULTRAM) 50 mg tablet      METABOLIC PANEL, COMPREHENSIVE      METABOLIC PANEL, COMPREHENSIVE   4. Vitamin D deficiency  E55.9 268.9    5. Spinal stenosis of lumbosacral region  S67.20 124.80 METABOLIC PANEL, COMPREHENSIVE      METABOLIC PANEL, COMPREHENSIVE      XR SPINE LUMB 2 OR 3 V      CANCELED: XR SPINE LUMB MIN 4 V   6. Spondylosis of lumbar region without myelopathy or radiculopathy  M47.816 721.3      Diagnoses and all orders for this visit:    1. Gastroesophageal reflux disease with esophagitis without hemorrhage    2.  Fibromyalgia  - traMADoL (ULTRAM) 50 mg tablet; Take 1 Tablet by mouth every six (6) hours as needed for Pain for up to 30 days. Max Daily Amount: 200 mg.  -     METABOLIC PANEL, COMPREHENSIVE; Future    3. Arthritis  -     traMADoL (ULTRAM) 50 mg tablet; Take 1 Tablet by mouth every six (6) hours as needed for Pain for up to 30 days. Max Daily Amount: 200 mg.  -     METABOLIC PANEL, COMPREHENSIVE; Future    4. Vitamin D deficiency    5. Spinal stenosis of lumbosacral region  -     METABOLIC PANEL, COMPREHENSIVE; Future  -     XR SPINE LUMB 2 OR 3 V; Future    6. Spondylosis of lumbar region without myelopathy or radiculopathy            reviewed medications and side effects in detail  Please call my office if there are any questions- [phone]. Discussed expected course/resolution/complications of diagnosis in detail with patient. Medication risks/benefits/costs/interactions/alternatives discussed with patient. Pt was given an after visit summary which includes diagnoses, current medications & vitals. Pt expressed understanding with the diagnosis and plan. Patient to call if no better in 3 -4 days and prn new problems. BMI is significantly elevated- in the obese range. I reviewed diet, exercise and weight control. Discussed weight control in detail, the importance of mainly decreased carbs, and for weight maintenance, exercise; discussed different diets and that it isn't as important to watch the type of foods as it is to decrease calorie intake no matter what type of diet you do, etc.     Total 30 minutes  re: Review of  the proper technique of checking the blood pressure- check it on an average day only, not on a stressful day, sitting, no exercise for at least 1 hour and not experiencing any new pain( chronic pain is OK).  Patient encouraged to check BP sitting and standing at least once a month and to report these readings to me if > 140/ 90 on average , or if the standing BP is >  15 points lower than the sitting. Always check it twice and if there is > 5 points decrease from the previous reading( top reading or systolic) keep checking it until it does not drop 5 points. Write only this final reading down, not the preceding readings. If out of these readings there is only 1 out of 4 or less > 421, or > 90 diastolic then your blood pressure is OK; it needs further treatment if it is above this. Also, don't forget,  as noted above, to check your blood pressure standing once a month; this is to detect a drop in your BP that might lead to fainting and serious injury; you check it standing with your arm hanging straight down and relaxed. Check it twice waiting 1 minute between the two readings. If, with either one of these 2 readings there is a > 15 point drop of the systolic compared to your sitting pressure( done before the standing BP), then let me know. Following these guidelines, continue to check your BP and write down only the ones described above and it will help me to effectively treat your blood pressure. Reviewed symptoms, or lack thereof, of hypertension and elevated cholesterol. Regular exercise is very important to your health; it helps mentally, physically, socially; it prevents injuries if done properly. Exercise, even as simple as walking 20-30 minutes daily has major benefits to your health even though your \"numbers\" are the same in the lab. See if you can add this into your daily regimen and after a few months it will become a regular habit-\"just something you do,\" like brushing your teeth. A combination of aerobic exercise and strengthening and stretching is felt to be the best for you, so this should be your ultimate goal.   This can be done in the privacy of your home or in a group setting as at the gym  Some prefer having a , others prefer to do exercise in groups or individually. Do what \"works\" for you.  You need to make it simple and \"fun,\" or you most likely will not continue it. Recommended a weekly \"heart check. \" I went into detail how to do this. Also, discussed symptoms of concern that were noted today in the note above, treatment options( including doing nothing), when to follow up before recommended time frame. Also, answered all questions. We did check her complete metabolic panel because she is on Celebrex and we will check liver and kidney functions. We also ordered a lumbar sacrospinal x-ray to evaluate her bilateral lower leg discomfort with his very suspicious for spinal stenosis. I encouraged her to try different maneuvers when she is trying to reive her discomfort. I explained to her she most likely has spinal stenosis, what that is, and that treatment will depend on what is causing the spinal stenosis. She takes her tramadol mainly for arthritis involving her joints. The worst are the ankles, knees, wrists, and hands. This document was written by Daria Villafuerte, as dictated by Eugene Valdovinos MD.  I have reviewed and agree with the above note and have made corrections where appropriate Wes Mcintyre M.D.

## 2021-08-04 NOTE — PROGRESS NOTES
Chief Complaint   Patient presents with    Medication Refill     follow up   1. Have you been to the ER, urgent care clinic since your last visit? Hospitalized since your last visit? No    2. Have you seen or consulted any other health care providers outside of the [address] [place] [address] since your last visit? Include any pap smears or colon screening.  No   mammo done

## 2021-08-29 DIAGNOSIS — M19.90 ARTHRITIS: ICD-10-CM

## 2021-08-30 RX ORDER — CELECOXIB 200 MG/1
CAPSULE ORAL
Qty: 180 CAPSULE | Refills: 1 | Status: SHIPPED | OUTPATIENT
Start: 2021-08-30 | End: 2022-08-26

## 2021-09-08 ENCOUNTER — OFFICE VISIT (OUTPATIENT)
Dept: NEUROLOGY | Age: 66
End: 2021-09-08
Payer: MEDICARE

## 2021-09-08 VITALS
SYSTOLIC BLOOD PRESSURE: 118 MMHG | OXYGEN SATURATION: 97 % | WEIGHT: 171.2 LBS | BODY MASS INDEX: 31.5 KG/M2 | TEMPERATURE: 98.7 F | HEIGHT: 62 IN | DIASTOLIC BLOOD PRESSURE: 78 MMHG | RESPIRATION RATE: 16 BRPM | HEART RATE: 65 BPM

## 2021-09-08 DIAGNOSIS — R29.898 COMPLAINTS OF WEAKNESS OF LOWER EXTREMITY: ICD-10-CM

## 2021-09-08 DIAGNOSIS — G43.009 MIGRAINE WITHOUT AURA AND WITHOUT STATUS MIGRAINOSUS, NOT INTRACTABLE: ICD-10-CM

## 2021-09-08 DIAGNOSIS — M79.10 MYALGIA: Primary | ICD-10-CM

## 2021-09-08 PROCEDURE — G8754 DIAS BP LESS 90: HCPCS | Performed by: PSYCHIATRY & NEUROLOGY

## 2021-09-08 PROCEDURE — G8417 CALC BMI ABV UP PARAM F/U: HCPCS | Performed by: PSYCHIATRY & NEUROLOGY

## 2021-09-08 PROCEDURE — G8510 SCR DEP NEG, NO PLAN REQD: HCPCS | Performed by: PSYCHIATRY & NEUROLOGY

## 2021-09-08 PROCEDURE — G9899 SCRN MAM PERF RSLTS DOC: HCPCS | Performed by: PSYCHIATRY & NEUROLOGY

## 2021-09-08 PROCEDURE — G8427 DOCREV CUR MEDS BY ELIG CLIN: HCPCS | Performed by: PSYCHIATRY & NEUROLOGY

## 2021-09-08 PROCEDURE — 3017F COLORECTAL CA SCREEN DOC REV: CPT | Performed by: PSYCHIATRY & NEUROLOGY

## 2021-09-08 PROCEDURE — G8752 SYS BP LESS 140: HCPCS | Performed by: PSYCHIATRY & NEUROLOGY

## 2021-09-08 PROCEDURE — 99214 OFFICE O/P EST MOD 30 MIN: CPT | Performed by: PSYCHIATRY & NEUROLOGY

## 2021-09-08 RX ORDER — DULOXETIN HYDROCHLORIDE 20 MG/1
20 CAPSULE, DELAYED RELEASE ORAL DAILY
Qty: 30 CAPSULE | Refills: 2 | Status: SHIPPED | OUTPATIENT
Start: 2021-09-08 | End: 2021-11-29 | Stop reason: DRUGHIGH

## 2021-09-08 NOTE — PROGRESS NOTES
Chief Complaint   Patient presents with    Headache     Patient had been having 2-4 headaches a month. 3 most recent PHQ Screens 9/8/2021   Little interest or pleasure in doing things Not at all   Feeling down, depressed, irritable, or hopeless Not at all   Total Score PHQ 2 0     Abuse Screening Questionnaire 9/8/2021   Do you ever feel afraid of your partner? N   Are you in a relationship with someone who physically or mentally threatens you? N   Is it safe for you to go home? Y     Visit Vitals  /78 (BP 1 Location: Left arm, BP Patient Position: Sitting)   Pulse 65   Temp 98.7 °F (37.1 °C) (Oral)   Resp 16   Ht 5' 2\" (1.575 m)   Wt 171 lb 3.2 oz (77.7 kg)   SpO2 97%   BMI 31.31 kg/m²     1. Have you been to the ER, urgent care clinic since your last visit? Hospitalized since your last visit?no    2. Have you seen or consulted any other health care providers outside of the [address] [place] [address] since your last visit? Include any pap smears or colon screening.  no

## 2021-09-08 NOTE — PROGRESS NOTES
Neurology Clinic Follow up Note    Patient ID:  Karla Moseley  [id number]  60 y.o.  1955      Ms. Dallas Retana is here for follow up today of  Chief Complaint   Patient presents with    Headache          Last Appointment With Me:  3/4/2021    Gypsy Hodgkins is presenting for evaluation of headaches. Headaches started during childhood with recent increasing frequency over the past 6-8 months. Location: L occipital with radiation to the frontal region  Character: throbbing  Intensity: On average 8/10  Frequency: 8-10x monthly  # HA free days per month: 15-20  Duration: 4+ hours  Aura: None  Associated Sx with HA: +nausea/vomiting, +phonophotophobia. Neurological ROS: Denies focal weakness, numbness or vision loss associated with headaches. +Dizziness/vertigo during recent ED visit with headache lasting 72h. Systemic ROS:   Caffeine use: 1-2 cups daily  H/O Head trauma: None  Depressive or anxiety Sx: None    Any change in pattern of HA? See above    Triggers: Unknown. Alleviating factors: Rest/sleep  FHx HA/migraine: Mother, sisters with migraines    Treatment so far: Imitrex PRN for rescue therapy-typically works well but not for her most recent severe headache, Tylenol PRN (2x weekly)  Prior preventatives: Metoprolol (started this >20 years ago)    Investigations so far: MRI Brain 12/30/2020 normal\"    Interval History:   She has discontinued TCA therapy due to AM fatigue even with low doses. Headaches are doing better. She experiences 2-4 headaches monthly, less severe, relieved with Ubrelvy PRN. She mentions some chronic pain into her thighs/lower legs when walking moderate distances, worse over the past several months. Her legs feel heavy. Pain is described as an aching sensation, relieved with rest. Climbing stairs exacerbates symptoms. No muscle atrophy or significant cramping. Denies LBP, SOB, dysphagia, diplopia. She admits to a h/o fibromyalgia.      PMHx/ PSHx/ FHx/ SHx:  Reviewed and unchanged previous visit. Past Medical History:   Diagnosis Date    Arthritis 12/20/2010    Cancer [facility]) 2008    right breast cancer    Colon polyp     DCIS (ductal carcinoma in situ) of breast 12/20/2010    right/surgery and radiation    Fibromyalgia 12/20/2010    GERD (gastroesophageal reflux disease) 12/20/2010    History of migraine 7/19/2018    History of migraine headaches 7/20/2017    Migraine 12/20/2010    Nausea & vomiting          ROS:  Comprehensive review of systems negative except for as noted above. Objective:       Meds:  Current Outpatient Medications   Medication Sig Dispense Refill    ubrogepant (Ubrelvy) 50 mg tablet Take 50 mg by mouth once as needed for Migraine.  celecoxib (CELEBREX) 200 mg capsule TAKE 1 CAPSULE BY MOUTH TWICE DAILY 180 Capsule 1    esomeprazole (NEXIUM) 40 mg capsule TAKE ONE CAPSULE BY MOUTH TWICE DAILY 180 Capsule 2    metoprolol tartrate (LOPRESSOR) 50 mg tablet TAKE 1 TABLET BY MOUTH TWICE DAILY 180 Tab 1    cholecalciferol (VITAMIN D3) 2,000 unit cap capsule Take 2,000 Units by mouth daily.  cyanocobalamin (VITAMIN B-12) 1,000 mcg tablet Take 1,000 mcg by mouth daily.  cyclosporine (RESTASIS) 0.05 % ophthalmic emulsion Administer 1 Drop to both eyes two (2) times a day. Exam:  Visit Vitals  /78 (BP 1 Location: Left arm, BP Patient Position: Sitting)   Pulse 65   Temp 98.7 °F (37.1 °C) (Oral)   Resp 16   Ht 5' 2\" (1.575 m)   Wt 171 lb 3.2 oz (77.7 kg)   SpO2 97%   BMI 31.31 kg/m²     NEUROLOGICAL EXAM:  General: Awake, alert, speech fluent  CN: PERRL, EOMI without nystagmus, VFF to confrontation, facial sensation and strength are normal and symmetric, hearing is intact to finger rub bilaterally, palate and tongue movements are intact and symmetric. Motor: Normal tone, bulk and strength bilaterally. Reflexes: 2/4 throughout  Coordination: FNF, SCOTT, HTS intact. Sensation: LT intact throughout.   Gait: Normal-based and steady. LABS  Results for orders placed or performed in visit on 96/53/03   METABOLIC PANEL, COMPREHENSIVE   Result Value Ref Range    Sodium 140 136 - 145 mmol/L    Potassium 4.2 3.5 - 5.1 mmol/L    Chloride 110 (H) 97 - 108 mmol/L    CO2 26 21 - 32 mmol/L    Anion gap 4 (L) 5 - 15 mmol/L    Glucose 120 (H) 65 - 100 mg/dL    BUN 11 6 - 20 MG/DL    Creatinine 0.67 0.55 - 1.02 MG/DL    BUN/Creatinine ratio 16 12 - 20      GFR est AA >60 >60 ml/min/1.73m2    GFR est non-AA >60 >60 ml/min/1.73m2    Calcium 9.4 8.5 - 10.1 MG/DL    Bilirubin, total 0.5 0.2 - 1.0 MG/DL    ALT (SGPT) 37 12 - 78 U/L    AST (SGOT) 18 15 - 37 U/L    Alk. phosphatase 197 (H) 45 - 117 U/L    Protein, total 6.9 6.4 - 8.2 g/dL    Albumin 3.9 3.5 - 5.0 g/dL    Globulin 3.0 2.0 - 4.0 g/dL    A-G Ratio 1.3 1.1 - 2.2         IMAGING:  MRI Results (most recent):  Results from Hospital Encounter encounter on 12/30/20   MRI BRAIN W WO CONT    Narrative *PRELIMINARY REPORT*    Indication: Atypical migraine, vertigo. EXAM: MRI brain with and without contrast.    COMPARISON: CT head earlier same day. Preliminary findings: No acute or subacute ischemia or other definite acute  intracranial abnormality is confirmed. ? Mild T2 and FLAIR hyperintensity in both  temporal lobes medially, of uncertain if any significance. Volumetric  T1-weighted images after contrast reveal? Mild prominence of meningeal thickness  over the clivus and upper cervical spine, of uncertain if any significance. Preliminary report was provided by Dr. Alberto Ortega, the on-call radiologist, at  [facility] 84 hours    Final report to follow. *END PRELIMINARY REPORT*    EXAM:  MRI BRAIN W WO CONT    INDICATION:    atypical migraine w/vertigo    COMPARISON:  CT head 12/30/2020. CONTRAST: 15 ml Dotarem. TECHNIQUE:    Multiplanar multisequence acquisition without and with contrast of the brain. FINDINGS:  The ventricles are normal in size and position.  The brain parenchyma has normal  signal characteristics for age. There is no acute infarct, hemorrhage,  extra-axial fluid collection, or mass effect. There is no cerebellar tonsillar  herniation. Expected arterial flow-voids are present. No evidence of abnormal  enhancement. Incidental small right parietal developmental venous anomaly. The paranasal sinuses are clear. Trace left mastoid effusion. The orbital  contents are within normal limits. No significant osseous or scalp lesions are  identified. Impression IMPRESSION:   1. No acute or significant intracranial abnormality. Assessment:     Encounter Diagnoses     ICD-10-CM ICD-9-CM   1. Myalgia  M79.10 729.1   2. Complaints of weakness of lower extremity  R29.898 729.89   3. 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 MRI Brain WWO performed 12/30/20 following ED evaluation for severe headache with vertigo showing no acute intracranial pathology. Presentation most consistent with vertiginous migraine variant. Migraines were not responsive to betablockers/metoprolol. She has noted a reduction in headache frequency after initiation of TCA therapy, but did not tolerate medication due to fatigue. We did discuss treatment alternatives including CGRP inhibitors which she previously deferred. For rescue migraine treatment, will continue Ubrelvy due to prior ineffectiveness with triptans. In addition to her migraines, she mentions new concerns regarding chronic progressive lower extremity pain/myalgias, subjective weakness worse with activity or when climbing stairs in particular limiting her ability to function to a degree.  Neurological examination appears non-focal. While this may likely be a manifestation of her pre-existing fibromyalgia, we will proceed with evaluation of underlying myopathy or neuromuscular junction disorder as outlined below. She will start Cymbalta which may assist with treatment of fibromyalgia as well as her migraines while work up is being completed. Headache education  Discussed pathophysiology of headache. Discussed treatment options, both abortive and preventive medications. Instructed patient about medications and potential side effects. Discussed medication overuse headache and to limit use of analgesics to less than 2 doses per week. Plan:     · EMG myopathy protocol (include repetitive stimulation)  · CK, aldolase, TSH, AchR ab  · Start Cymbalta 20mg daily for headache ppx and fibromyalgia  · Continue Ubrelvy 50mg PRN for migraine rescue therapy       Follow-up and Dispositions    · Return in about 4 months (around 1/8/2022). I have discussed the diagnosis with the patient today and the intended plan as seen in the above orders with both the patient as well as referring provider and/or PCP via electronic correspondence. The patient has received an after-visit summary and questions were answered concerning future plans. I have discussed medication side effects and warnings with the patient as well.       Signed:  Torres Alcantar DO  9/8/2021

## 2021-09-22 LAB
ACHR BIND AB SER-SCNC: <0.03 NMOL/L (ref 0–0.24)
ACHR BLOCK AB SER-ACNC: 14 % (ref 0–25)
ACHR MOD AB/ACHR TOTAL SFR SER: <12 % (ref 0–20)
ALDOLASE SERPL-CCNC: 5.5 U/L (ref 3.3–10.3)
CK SERPL-CCNC: 83 U/L (ref 32–182)
TSH SERPL DL<=0.005 MIU/L-ACNC: 2.13 UIU/ML (ref 0.45–4.5)

## 2021-10-02 DIAGNOSIS — M19.90 ARTHRITIS: ICD-10-CM

## 2021-10-02 DIAGNOSIS — M79.7 FIBROMYALGIA: ICD-10-CM

## 2021-10-04 RX ORDER — METOPROLOL TARTRATE 50 MG/1
TABLET ORAL
Qty: 180 TABLET | Refills: 0 | Status: SHIPPED | OUTPATIENT
Start: 2021-10-04 | End: 2021-12-29

## 2021-10-06 ENCOUNTER — OFFICE VISIT (OUTPATIENT)
Dept: NEUROLOGY | Age: 66
End: 2021-10-06
Payer: MEDICARE

## 2021-10-06 DIAGNOSIS — R29.898 WEAKNESS OF BOTH LOWER EXTREMITIES: ICD-10-CM

## 2021-10-06 DIAGNOSIS — M79.10 MYALGIA: Primary | ICD-10-CM

## 2021-10-06 PROCEDURE — 95909 NRV CNDJ TST 5-6 STUDIES: CPT | Performed by: PSYCHIATRY & NEUROLOGY

## 2021-10-06 PROCEDURE — 95886 MUSC TEST DONE W/N TEST COMP: CPT | Performed by: PSYCHIATRY & NEUROLOGY

## 2021-10-06 PROCEDURE — 95937 NEUROMUSCULAR JUNCTION TEST: CPT | Performed by: PSYCHIATRY & NEUROLOGY

## 2021-10-06 NOTE — PROGRESS NOTES
[facility] Neurology [facility]  [address]  Phone [phone] Fax [phone]  Test Date:  10/6/2021    Patient: Dai Post : 1955 Physician: Sara Patel DO   Sex: Female Height: ' \" Ref Phys: Sara Patel DO   ID#: [id number]  Weight:  lbs. Technician: Sawyer Penn     Patient Complaints:  Bilateral lower extremity weakness; Myalgia    NCV & EMG Findings:  All nerve conduction studies were within normal limits. All examined muscles (as indicated in the following table) showed no evidence of electrical instability. Impression:  Extensive electrodiagnostic evaluation of the left upper and lower extremity with related paraspinal muscles reveals no evidence of a generalized myopathy or disorder of neuromuscular junction transmission, as could be seen in myasthenia gravis or Lambert-Eaton myasthenic syndrome. Specifically, there are no myopathic appearing motor units, motor unit instability or myotonic discharges noted during this study. In addition, repetitive nerve stimulation does not reveal evidence of reproducible decrement.      ___________________________  Sara Patel DO        Nerve Conduction Studies  Anti Sensory Summary Table     Stim Site NR Peak (ms) Norm Peak (ms) P-T Amp (µV) Norm P-T Amp Onset (ms) Site1 Site2 Delta-P (ms) Dist (cm) Joe (m/s) Norm Joe (m/s)   Left Median Anti Sensory (2nd Digit)  31.7°C   Wrist    3.0 <3.6 15.2 >10 2.1 Wrist 2nd Digit 3.0 14.0 47 >39   Left Sural Anti Sensory (Lat Mall)  31.7°C   Calf    3.5 <4.0 11.3 >5.0 2.8 Calf Lat Mall 3.5 14.0 40 >35     Motor Summary Table     Stim Site NR Onset (ms) Norm Onset (ms) O-P Amp (mV) Norm O-P Amp Site1 Site2 Delta-0 (ms) Dist (cm) Joe (m/s) Norm Joe (m/s)   Left Median Motor (Abd Poll Brev)  29.7°C   Wrist    3.0 <4.2 8.2 >5 Elbow Wrist 3.2 18.0 56 >50   Elbow    6.2  7.7          Left Peroneal Motor (Ext Dig Brev)  31°C   Ankle    3.8 <6.1 4.3 >2.5 B Fib Ankle 5.4 32.0 59 >38   B Fib    9.2  4.2  Poplt B Fib 1.3 10.0 77 >40   Poplt    10.5  4.0          Left Tibial Motor (Abd Silva Brev)  32°C   Ankle    3.1 <6.1 10.3 >3.0 Knee Ankle 6.6 33.0 50 >35   Knee    9.7  8.7            EMG     Side Muscle Nerve Root Ins Act Fibs Psw Amp Dur Poly Recrt Int Sayra Smolder Comment   Left Gastroc Tibial S1-2 Nml Nml Nml Nml Nml 0 Nml Nml    Left AntTibialis Dp Br Peronel L4-5 Nml Nml Nml Nml Nml 0 Nml Nml    Left RectFemoris Femoral L2-4 Nml Nml Nml Nml Nml 0 Nml Nml    Left GluteusMed SupGluteal L5-S1 Nml Nml Nml Nml Nml 0 Nml Nml    Left Lumbo Parasp Low Rami L5-S1 Nml Nml Nml         Left BrachioRad Radial C5-6 Nml Nml Nml Nml Nml 0 Nml Nml    Left Biceps Musculocut C5-6 Nml Nml Nml Nml Nml 0 Nml Nml    Left Triceps Radial C6-7-8 Nml Nml Nml Nml Nml 0 Nml Nml    Left Deltoid Axillary C5-6 Nml Nml Nml Nml Nml 0 Nml Nml    Left 1stDorInt Ulnar C8-T1 Nml Nml Nml Nml Nml 0 Nml Nml    Left Ext Dig Brev Dp Br Peronel L5, S1 Nml Nml Nml Nml Nml 0 Nml Nml      RNS     Trial # Label Amp 1 (mV)  O-P Amp 5 (mV)  O-P Amp % Dif Area 1 (mV·ms) Area 5 (mV·ms) Area % Dif Rep Rate Train Length Pause Time (min:sec) Comments   Left Abd Poll Brev   Tr 1 Baseline 6.81   20.35   3.00 10 00:30    Tr 7 Post Exercise 7.61 7.45 -2.0 24.52 22.97 -6.3 3.00 10 01:00    Tr 15 1 min Post 7.63 7.53 -1.3 24.93 23.74 -4.8 3.00 10 01:00    Tr 16 2 min Post 8.53 8.40 -1.5 26.97 25.46 -5.6 3.00 10 01:00    Tr 18 3 min Post 8.17 8. 11 -0.6 25.40 24.02 -5.4 3.00 10 00:00              Waveforms:

## 2021-11-29 ENCOUNTER — TELEPHONE (OUTPATIENT)
Dept: NEUROLOGY | Age: 66
End: 2021-11-29

## 2021-11-29 RX ORDER — DULOXETIN HYDROCHLORIDE 60 MG/1
60 CAPSULE, DELAYED RELEASE ORAL DAILY
Qty: 30 CAPSULE | Refills: 2 | Status: SHIPPED | OUTPATIENT
Start: 2021-11-29 | End: 2022-01-26 | Stop reason: SDUPTHER

## 2021-11-29 NOTE — TELEPHONE ENCOUNTER
Spoke with patient, she is requesting an increase of her Cymbalta. She says this was discussed with  when she had her EMG done. She would like the updated prescription to go to the [facility].

## 2021-11-29 NOTE — TELEPHONE ENCOUNTER
----- Message from April Rabb sent at 11/29/2021 12:51 PM EST -----  Regarding: /Telephone  Medication Refill    Caller (if not patient): Self      Relationship of caller (if not patient): n/a      Best contact number(s): [phone]      Name of medication and dosage if known: \" synboltia  \"       Is patient out of this medication (yes/no):  No ( 7-10 days left )       Pharmacy name:    Pharmacy listed in chart? (yes/no): Yes   Pharmacy phone number:      Details to clarify the request: Pt also stated if  wanted to increase her dose as they spoke about she can do it now       April Rabb

## 2021-11-30 NOTE — TELEPHONE ENCOUNTER
Spoke with patient, informed her per -Sent in prescription for Cymbalta 60mg daily. Potential side effects may include GI upset/nausea. Also please let us know if any change in mood.

## 2021-12-29 DIAGNOSIS — M19.90 ARTHRITIS: ICD-10-CM

## 2021-12-29 DIAGNOSIS — M79.7 FIBROMYALGIA: ICD-10-CM

## 2021-12-30 RX ORDER — METOPROLOL TARTRATE 50 MG/1
TABLET ORAL
Qty: 180 TABLET | Refills: 1 | Status: SHIPPED | OUTPATIENT
Start: 2021-12-30 | End: 2022-02-01 | Stop reason: SDUPTHER

## 2022-01-07 ENCOUNTER — TELEPHONE (OUTPATIENT)
Dept: FAMILY MEDICINE CLINIC | Age: 67
End: 2022-01-07

## 2022-01-07 NOTE — TELEPHONE ENCOUNTER
Chief Complaint   Patient presents with    Prior [facility] approval for Esomeprazole Magnesium 40 mg 12/08/21 through 1/07/2023      Patient and pharmacy aware

## 2022-01-26 ENCOUNTER — OFFICE VISIT (OUTPATIENT)
Dept: NEUROLOGY | Age: 67
End: 2022-01-26
Payer: MEDICARE

## 2022-01-26 VITALS
WEIGHT: 155 LBS | HEART RATE: 75 BPM | RESPIRATION RATE: 18 BRPM | DIASTOLIC BLOOD PRESSURE: 78 MMHG | OXYGEN SATURATION: 97 % | SYSTOLIC BLOOD PRESSURE: 110 MMHG | BODY MASS INDEX: 28.52 KG/M2 | HEIGHT: 62 IN

## 2022-01-26 DIAGNOSIS — M79.7 FIBROMYALGIA: ICD-10-CM

## 2022-01-26 DIAGNOSIS — G43.009 MIGRAINE WITHOUT AURA AND WITHOUT STATUS MIGRAINOSUS, NOT INTRACTABLE: Primary | ICD-10-CM

## 2022-01-26 PROCEDURE — G8752 SYS BP LESS 140: HCPCS | Performed by: PSYCHIATRY & NEUROLOGY

## 2022-01-26 PROCEDURE — 1090F PRES/ABSN URINE INCON ASSESS: CPT | Performed by: PSYCHIATRY & NEUROLOGY

## 2022-01-26 PROCEDURE — 1101F PT FALLS ASSESS-DOCD LE1/YR: CPT | Performed by: PSYCHIATRY & NEUROLOGY

## 2022-01-26 PROCEDURE — G8427 DOCREV CUR MEDS BY ELIG CLIN: HCPCS | Performed by: PSYCHIATRY & NEUROLOGY

## 2022-01-26 PROCEDURE — G8536 NO DOC ELDER MAL SCRN: HCPCS | Performed by: PSYCHIATRY & NEUROLOGY

## 2022-01-26 PROCEDURE — G9899 SCRN MAM PERF RSLTS DOC: HCPCS | Performed by: PSYCHIATRY & NEUROLOGY

## 2022-01-26 PROCEDURE — G8754 DIAS BP LESS 90: HCPCS | Performed by: PSYCHIATRY & NEUROLOGY

## 2022-01-26 PROCEDURE — G8510 SCR DEP NEG, NO PLAN REQD: HCPCS | Performed by: PSYCHIATRY & NEUROLOGY

## 2022-01-26 PROCEDURE — G8417 CALC BMI ABV UP PARAM F/U: HCPCS | Performed by: PSYCHIATRY & NEUROLOGY

## 2022-01-26 PROCEDURE — 3017F COLORECTAL CA SCREEN DOC REV: CPT | Performed by: PSYCHIATRY & NEUROLOGY

## 2022-01-26 PROCEDURE — G8399 PT W/DXA RESULTS DOCUMENT: HCPCS | Performed by: PSYCHIATRY & NEUROLOGY

## 2022-01-26 PROCEDURE — 99214 OFFICE O/P EST MOD 30 MIN: CPT | Performed by: PSYCHIATRY & NEUROLOGY

## 2022-01-26 RX ORDER — TRAMADOL HYDROCHLORIDE 50 MG/1
50 TABLET ORAL DAILY
COMMUNITY
Start: 2021-10-26 | End: 2022-11-01

## 2022-01-26 RX ORDER — DULOXETIN HYDROCHLORIDE 60 MG/1
60 CAPSULE, DELAYED RELEASE ORAL DAILY
Qty: 90 CAPSULE | Refills: 1 | Status: SHIPPED | OUTPATIENT
Start: 2022-01-26 | End: 2022-07-26 | Stop reason: SDUPTHER

## 2022-01-26 NOTE — PROGRESS NOTES
Neurology Clinic Follow up Note    Patient ID:  Satish Casiano  [id number]  73 y.o.  1955      Ms. Liz Bruno is here for follow up today of migraines, fibromyalgia. Last Appointment With Me:  9/8/2021    Ricki Lutz is presenting for evaluation of headaches. Headaches started during childhood with recent increasing frequency over the past 6-8 months. Location: L occipital with radiation to the frontal region  Character: throbbing  Intensity: On average 8/10  Frequency: 8-10x monthly  # HA free days per month: 15-20  Duration: 4+ hours  Aura: None  Associated Sx with HA: +nausea/vomiting, +phonophotophobia. Neurological ROS: Denies focal weakness, numbness or vision loss associated with headaches. +Dizziness/vertigo during recent ED visit with headache lasting 72h. Systemic ROS:   Caffeine use: 1-2 cups daily  H/O Head trauma: None  Depressive or anxiety Sx: None    Any change in pattern of HA? See above    Triggers: Unknown. Alleviating factors: Rest/sleep  FHx HA/migraine: Mother, sisters with migraines    Treatment so far: Imitrex PRN for rescue therapy-typically works well but not for her most recent severe headache, Tylenol PRN (2x weekly)  Prior preventatives: Metoprolol (started this >20 years ago)    Investigations so far: MRI Brain 12/30/2020 normal\"    Interval History:   Headaches are doing better. She experiences 2-3x headaches monthly, less severe, relieved with Ubrelvy PRN. S/P EMG myopathy protocol which was normal since last visit due to chronic pain into her thighs/lower legs when walking moderate distances. No muscle atrophy or significant cramping. She admits to a h/o fibromyalgia. Leg pains have improved with initiation of Cymbalta. Denies side effects with medication. PMHx/ PSHx/ FHx/ SHx:  Reviewed and unchanged previous visit.    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 Objective:       Meds:  Current Outpatient Medications   Medication Sig Dispense Refill    metoprolol tartrate (LOPRESSOR) 50 mg tablet TAKE 1 TABLET BY MOUTH TWICE DAILY 180 Tablet 1    DULoxetine (CYMBALTA) 60 mg capsule Take 1 Capsule by mouth daily. 30 Capsule 2    ubrogepant (Ubrelvy) 50 mg tablet Take 1 Tablet by mouth as needed for Migraine (Take at onset of migraine. Max dose 50mg/d.). 10 Tablet 2    celecoxib (CELEBREX) 200 mg capsule TAKE 1 CAPSULE BY MOUTH TWICE DAILY 180 Capsule 1    esomeprazole (NEXIUM) 40 mg capsule TAKE ONE CAPSULE BY MOUTH TWICE DAILY 180 Capsule 2    cholecalciferol (VITAMIN D3) 2,000 unit cap capsule Take 2,000 Units by mouth daily.  cyanocobalamin (VITAMIN B-12) 1,000 mcg tablet Take 1,000 mcg by mouth daily.  cyclosporine (RESTASIS) 0.05 % ophthalmic emulsion Administer 1 Drop to both eyes two (2) times a day. Exam:  Visit Vitals  /78   Pulse 75   Resp 18   Ht 5' 2\" (1.575 m)   Wt 155 lb (70.3 kg)   SpO2 97%   BMI 28.35 kg/m²     NEUROLOGICAL EXAM:  General: Awake, alert, speech fluent  CN: PERRL, EOMI without nystagmus, VFF to confrontation, facial sensation and strength are normal and symmetric, hearing is intact to finger rub bilaterally, palate and tongue movements are intact and symmetric. Motor: Normal tone, bulk and strength bilaterally. Reflexes: Deferred  Coordination: FNF, SCOTT, HTS intact. Sensation: LT intact throughout. Gait: Normal-based and steady.       LABS  Results for orders placed or performed in visit on 09/08/21   TSH 3RD GENERATION   Result Value Ref Range    TSH 2.130 0.450 - 4.500 uIU/mL   ACETYLCHOLINE BINDING AB   Result Value Ref Range    AChR Binding Ab, serum <0.03 0.00 - 0.24 nmol/L   ACETYLCHOLINE BLOCKING AB   Result Value Ref Range    AChR Blocking Ab 14 0 - 25 %   ACETYLCHOLINE REC MOD AB   Result Value Ref Range    AChR Modulating Ab <12 0 - 20 %   CK   Result Value Ref Range    Creatine Kinase,Total 83 32 - 182 U/L   ALDOLASE   Result Value Ref Range    Aldolase 5.5 3.3 - 10.3 U/L       IMAGING:  MRI Results (most recent):  Results from Hospital Encounter encounter on 12/30/20   MRI BRAIN W WO CONT    Narrative *PRELIMINARY REPORT*    Indication: Atypical migraine, vertigo. EXAM: MRI brain with and without contrast.    COMPARISON: CT head earlier same day. Preliminary findings: No acute or subacute ischemia or other definite acute  intracranial abnormality is confirmed. ? Mild T2 and FLAIR hyperintensity in both  temporal lobes medially, of uncertain if any significance. Volumetric  T1-weighted images after contrast reveal? Mild prominence of meningeal thickness  over the clivus and upper cervical spine, of uncertain if any significance. Preliminary report was provided by Dr. Geno Wadsworth, the on-call radiologist, at  [facility] 84 hours    Final report to follow. *END PRELIMINARY REPORT*    EXAM:  MRI BRAIN W WO CONT    INDICATION:    atypical migraine w/vertigo    COMPARISON:  CT head 12/30/2020. CONTRAST: 15 ml Dotarem. TECHNIQUE:    Multiplanar multisequence acquisition without and with contrast of the brain. FINDINGS:  The ventricles are normal in size and position. The brain parenchyma has normal  signal characteristics for age. There is no acute infarct, hemorrhage,  extra-axial fluid collection, or mass effect. There is no cerebellar tonsillar  herniation. Expected arterial flow-voids are present. No evidence of abnormal  enhancement. Incidental small right parietal developmental venous anomaly. The paranasal sinuses are clear.  Trace left mastoid effusion. The orbital  contents are within normal limits. No significant osseous or scalp lesions are  identified. Impression IMPRESSION:   1. No acute or significant intracranial abnormality. EMG:  Impression:  Extensive electrodiagnostic evaluation of the left upper and lower extremity with related paraspinal muscles reveals no evidence of a generalized myopathy or disorder of neuromuscular junction transmission, as could be seen in myasthenia gravis or Lambert-Eaton myasthenic syndrome. Specifically, there are no myopathic appearing motor units, motor unit instability or myotonic discharges noted during this study. In addition, repetitive nerve stimulation does not reveal evidence of reproducible decrement. Assessment:     Encounter Diagnoses     ICD-10-CM ICD-9-CM   1. Migraine without aura and without status migrainosus, not intractable  G43.009 346.10   2. 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 MRI Brain WWO performed 12/30/20 following ED evaluation for severe headache with vertigo showing no acute intracranial pathology. Presentation most consistent with vertiginous migraine variant. Migraines were not responsive to betablockers/metoprolol. She noted a reduction in headache frequency after initiation of TCA therapy, but did not tolerate medication due to fatigue. Migraine frequency/severity has improved with initiation of Cymbalta. For rescue migraine treatment, will continue Ubrelvy due to prior ineffectiveness with triptans. In addition to her migraines, she previously reported chronic progressive lower extremity pain/myalgias, subjective weakness worse with activity or when climbing stairs limiting her ability to function.  Neurological examination appears non-focal. EMG myopathy protocol was normal. Thyroid studies, CK levels and AchR antibodies were also normal. Symptoms are likely a manifestation of her fibromyalgia. She has noted improvement with titration of Cymbalta which may assist with treatment of fibromyalgia as well as her migraines. She elects to continue with current therapy. Headache education  Discussed pathophysiology of headache. Discussed treatment options, both abortive and preventive medications. Instructed patient about medications and potential side effects. Discussed medication overuse headache and to limit use of analgesics to less than 2 doses per week. Plan:   Continue Cymbalta 60mg daily for headache ppx and fibromyalgia  Continue Ubrelvy 50mg PRN for migraine rescue therapy       Follow-up and Dispositions    · Return in about 6 months (around 7/26/2022). I have discussed the diagnosis with the patient today and the intended plan as seen in the above orders with both the patient as well as referring provider and/or PCP via electronic correspondence. The patient has received an after-visit summary and questions were answered concerning future plans. I have discussed medication side effects and warnings with the patient as well.       Signed:  Jean Leon DO  1/26/2022

## 2022-01-26 NOTE — PROGRESS NOTES
Nayan Banegas is a 73 y.o. female  HIPAA verified by two patient identifiers. Health Maintenance Due   Topic    DTaP/Tdap/Td series (1 - Tdap)    Cervical cancer screen     Flu Vaccine (1)    Medicare Yearly Exam     Bone Densitometry (Dexa) Screening     Pneumococcal 65+ years (1 of 1 - PPSV23)    COVID-19 Vaccine (3 - Booster for Moderna series)     Chief Complaint   Patient presents with    Follow-up     Migraines are geting better due to medications     Visit Vitals  /78   Pulse 75   Resp 18   Ht 5' 2\" (1.575 m)   Wt 155 lb (70.3 kg)   SpO2 97%   BMI 28.35 kg/m²       Pain Scale: 0 - No pain/10  Pain Location:   1. Have you been to the ER, urgent care clinic since your last visit? Hospitalized since your last visit? No    2. Have you seen or consulted any other health care providers outside of the [address] [place] [address] since your last visit? Include any pap smears or colon screening.  No

## 2022-02-01 ENCOUNTER — OFFICE VISIT (OUTPATIENT)
Dept: FAMILY MEDICINE CLINIC | Age: 67
End: 2022-02-01
Payer: MEDICARE

## 2022-02-01 VITALS
SYSTOLIC BLOOD PRESSURE: 120 MMHG | HEIGHT: 62 IN | WEIGHT: 156 LBS | DIASTOLIC BLOOD PRESSURE: 70 MMHG | OXYGEN SATURATION: 97 % | HEART RATE: 75 BPM | RESPIRATION RATE: 16 BRPM | TEMPERATURE: 98.3 F | BODY MASS INDEX: 28.71 KG/M2

## 2022-02-01 DIAGNOSIS — M47.816 SPONDYLOSIS OF LUMBAR REGION WITHOUT MYELOPATHY OR RADICULOPATHY: ICD-10-CM

## 2022-02-01 DIAGNOSIS — Z86.69 HISTORY OF MIGRAINE HEADACHES: ICD-10-CM

## 2022-02-01 DIAGNOSIS — K21.00 GASTROESOPHAGEAL REFLUX DISEASE WITH ESOPHAGITIS: ICD-10-CM

## 2022-02-01 DIAGNOSIS — M48.07 SPINAL STENOSIS OF LUMBOSACRAL REGION: ICD-10-CM

## 2022-02-01 DIAGNOSIS — K21.00 GASTROESOPHAGEAL REFLUX DISEASE WITH ESOPHAGITIS WITHOUT HEMORRHAGE: Primary | ICD-10-CM

## 2022-02-01 DIAGNOSIS — E55.9 VITAMIN D DEFICIENCY: ICD-10-CM

## 2022-02-01 DIAGNOSIS — Z86.69 HISTORY OF MIGRAINE: ICD-10-CM

## 2022-02-01 DIAGNOSIS — M85.88 OSTEOPENIA OF LUMBAR SPINE: ICD-10-CM

## 2022-02-01 DIAGNOSIS — I10 ESSENTIAL HYPERTENSION: ICD-10-CM

## 2022-02-01 DIAGNOSIS — M19.90 ARTHRITIS: ICD-10-CM

## 2022-02-01 DIAGNOSIS — F11.99 OPIOID USE, UNSPECIFIED WITH UNSPECIFIED OPIOID-INDUCED DISORDER (HCC): ICD-10-CM

## 2022-02-01 DIAGNOSIS — M79.7 FIBROMYALGIA: ICD-10-CM

## 2022-02-01 PROCEDURE — 1090F PRES/ABSN URINE INCON ASSESS: CPT | Performed by: FAMILY MEDICINE

## 2022-02-01 PROCEDURE — G9899 SCRN MAM PERF RSLTS DOC: HCPCS | Performed by: FAMILY MEDICINE

## 2022-02-01 PROCEDURE — G8510 SCR DEP NEG, NO PLAN REQD: HCPCS | Performed by: FAMILY MEDICINE

## 2022-02-01 PROCEDURE — G8399 PT W/DXA RESULTS DOCUMENT: HCPCS | Performed by: FAMILY MEDICINE

## 2022-02-01 PROCEDURE — G8754 DIAS BP LESS 90: HCPCS | Performed by: FAMILY MEDICINE

## 2022-02-01 PROCEDURE — G8417 CALC BMI ABV UP PARAM F/U: HCPCS | Performed by: FAMILY MEDICINE

## 2022-02-01 PROCEDURE — G0463 HOSPITAL OUTPT CLINIC VISIT: HCPCS | Performed by: FAMILY MEDICINE

## 2022-02-01 PROCEDURE — 99214 OFFICE O/P EST MOD 30 MIN: CPT | Performed by: FAMILY MEDICINE

## 2022-02-01 PROCEDURE — 3017F COLORECTAL CA SCREEN DOC REV: CPT | Performed by: FAMILY MEDICINE

## 2022-02-01 PROCEDURE — G8752 SYS BP LESS 140: HCPCS | Performed by: FAMILY MEDICINE

## 2022-02-01 PROCEDURE — G8427 DOCREV CUR MEDS BY ELIG CLIN: HCPCS | Performed by: FAMILY MEDICINE

## 2022-02-01 PROCEDURE — G8536 NO DOC ELDER MAL SCRN: HCPCS | Performed by: FAMILY MEDICINE

## 2022-02-01 PROCEDURE — 1101F PT FALLS ASSESS-DOCD LE1/YR: CPT | Performed by: FAMILY MEDICINE

## 2022-02-01 RX ORDER — TRAMADOL HYDROCHLORIDE 50 MG/1
50 TABLET ORAL
Qty: 90 TABLET | Refills: 1 | Status: SHIPPED | OUTPATIENT
Start: 2022-02-01 | End: 2022-03-03

## 2022-02-01 RX ORDER — CHOLECALCIFEROL (VITAMIN D3) 125 MCG
5000 CAPSULE ORAL DAILY
COMMUNITY
Start: 2022-02-01

## 2022-02-01 RX ORDER — METOPROLOL TARTRATE 50 MG/1
50 TABLET ORAL 2 TIMES DAILY
Qty: 180 TABLET | Refills: 1 | Status: SHIPPED | OUTPATIENT
Start: 2022-02-01 | End: 2022-06-30

## 2022-02-01 RX ORDER — ESOMEPRAZOLE MAGNESIUM 40 MG/1
40 CAPSULE, DELAYED RELEASE ORAL 2 TIMES DAILY
Qty: 180 CAPSULE | Refills: 2 | Status: SHIPPED | OUTPATIENT
Start: 2022-02-01 | End: 2022-04-11

## 2022-02-01 NOTE — PROGRESS NOTES
Chief Complaint   Patient presents with    GERD   1. \"Have you been to the ER, urgent care clinic since your last visit? Hospitalized since your last visit? \" No    2. \"Have you seen or consulted any other health care providers outside of the [address] [place] [address] since your last visit? \" No Dr Yoel Gallego    3. For patients over 45: Has the patient had a colonoscopy? Yes - no Care Gap present     If the patient is female:    4. For patients over 40: Has the patient had a mammogram? Yes - no Care Gap present    5. For patients over 21: Has the patient had a pap smear?  Yes - no Care Gap present Interviewed and evaluated

## 2022-02-01 NOTE — PROGRESS NOTES
HISTORY OF PRESENT ILLNESS  HPI  Johnny Navarro a 66 y. o. female with a history of migraine, GERD, colon polyp, osteopenia and osteoarthritis of spine, lumbar spondylosis, appendectomy, DCIS of breast and fibromyalgia, who presents to the office today for f/u of these health problems. Pt comes in to follow up on her Nexium. Pt takes Nexium 40 mg BID. She usually takes the Nexium on a empty stomach. When she does not take the Nexium, she will have reflux and food coming back up. Her symptoms can come on anytime and sometimes wake her from sleep. This rarely happens it if she takes the medicine regularly. She will notice symptoms within a few hours of missing a dose. Pt had these symptoms as a child and into her teens. The symptoms went away and came back when she was in her 29's. As a child, she had less heartburn and more food coming up. Pt has no hx of blockage or esophageal dilation. Pt has some FHX of GERD via her mom, dad, sister, maternal grandfather and grandmother, and her paternal aunts. She takes vitamin B12 and 5000 units of vitamin D daily. She recalls she had gradually lowering B12 levels in the past . Pt estimates she has been on the vitamin B12 and vitamin D for 12 years    Pt denies unusual SOB, chest pain, and any recent ER visits or hospitalizations.          Past Medical History:   Diagnosis Date    Arthritis 12/20/2010    Cancer [facility]) 2008    right breast cancer    Colon polyp     DCIS (ductal carcinoma in situ) of breast 12/20/2010    right/surgery and radiation    Fibromyalgia 12/20/2010    GERD (gastroesophageal reflux disease) 12/20/2010    History of migraine 7/19/2018    History of migraine headaches 7/20/2017    Migraine 12/20/2010    Nausea & vomiting      Past Surgical History:   Procedure Laterality Date    COLONOSCOPY N/A 8/22/2016    COLONOSCOPY performed by Neema Rivers MD at [address] COLONOSCOPY N/A 11/5/2019    COLONOSCOPY performed by Anthony Rosario Bárbara Carter MD at [facility] ENDOSCOPY   [address]       x2    HX APPENDECTOMY      HX MASTECTOMY  2008    right,then reconstruction/implant     Current Outpatient Medications on File Prior to Visit   Medication Sig Dispense Refill    cholecalciferol (VITAMIN D3) (5000 Units /125 mcg) capsule Take 1 Capsule by mouth daily.  traMADoL (ULTRAM) 50 mg tablet       DULoxetine (CYMBALTA) 60 mg capsule Take 1 Capsule by mouth daily. 90 Capsule 1    ubrogepant (Ubrelvy) 50 mg tablet Take 1 Tablet by mouth as needed for Migraine (Take at onset of migraine. Max dose 50mg/d.). 10 Tablet 2    celecoxib (CELEBREX) 200 mg capsule TAKE 1 CAPSULE BY MOUTH TWICE DAILY 180 Capsule 1    cyanocobalamin (VITAMIN B-12) 1,000 mcg tablet Take 1,000 mcg by mouth daily.  cyclosporine (RESTASIS) 0.05 % ophthalmic emulsion Administer 1 Drop to both eyes two (2) times a day. No current facility-administered medications on file prior to visit. Allergies   Allergen Reactions    Aspirin Nausea and Vomiting    Betadine [Povidone-Iodine] Rash    Nsaids (Non-Steroidal Anti-Inflammatory Drug) Nausea and Vomiting    Other Medication Nausea and Vomiting     General anes. .. - Pt does not know what this is.      Family History   Problem Relation Age of Onset    Coronary Art Dis Mother     Hypertension Mother     Heart Disease Mother     Diabetes Mother     Pulmonary Embolism Mother     Coronary Art Dis Father     Heart Disease Father     Hypertension Father     Kidney Disease Father     Thyroid Disease Father     Stroke Maternal Grandmother 76    Diabetes Maternal Grandfather 80    COPD Maternal Grandfather     Hypertension Paternal Grandmother 80    Coronary Art Dis Paternal Grandfather 80    Hypertension Paternal Grandfather      Social History     Socioeconomic History    Marital status:    Tobacco Use    Smoking status: Never Smoker    Smokeless tobacco: Never Used   Vaping Use    Vaping Use: Never used   Substance and Sexual Activity    Alcohol use: No    Drug use: No    Sexual activity: Not Currently             Review of Systems   Constitutional: Negative for chills, diaphoresis, fever, malaise/fatigue and weight loss. Eyes: Negative for blurred vision, double vision, pain and redness. Respiratory: Negative for cough, shortness of breath and wheezing. Cardiovascular: Negative for chest pain, palpitations, orthopnea, claudication, leg swelling and PND. Skin: Negative for itching and rash. Neurological: Negative for dizziness, tingling, tremors, sensory change, speech change, focal weakness, seizures, loss of consciousness, weakness and headaches. Results for orders placed or performed in visit on 09/08/21   TSH 3RD GENERATION   Result Value Ref Range    TSH 2.130 0.450 - 4.500 uIU/mL   ACETYLCHOLINE BINDING AB   Result Value Ref Range    AChR Binding Ab, serum <0.03 0.00 - 0.24 nmol/L   ACETYLCHOLINE BLOCKING AB   Result Value Ref Range    AChR Blocking Ab 14 0 - 25 %   ACETYLCHOLINE REC MOD AB   Result Value Ref Range    AChR Modulating Ab <12 0 - 20 %   CK   Result Value Ref Range    Creatine Kinase,Total 83 32 - 182 U/L   ALDOLASE   Result Value Ref Range    Aldolase 5.5 3.3 - 10.3 U/L             Physical Exam  Visit Vitals  /70 (BP 1 Location: Left arm, BP Patient Position: Sitting, BP Cuff Size: Large adult)   Pulse 75   Temp 98.3 °F (36.8 °C)   Resp 16   Ht 5' 2\" (1.575 m)   Wt 156 lb (70.8 kg)   SpO2 97%   BMI 28.53 kg/m²         Head: Normocephalic, without obvious abnormality, atraumatic  Eyes: conjunctivae/corneas clear. PERRL, EOM's intact.    Neck: supple, symmetrical, trachea midline, no adenopathy, thyroid: not enlarged, symmetric, no tenderness/mass/nodules, no carotid bruit and no JVD  Lungs: clear to auscultation bilaterally  Heart: regular rate and rhythm, S1, S2 normal, no murmur, click, rub or gallop  Extremities: extremities normal, atraumatic, no cyanosis or edema  Pulses: 2+ and symmetric  Lymph nodes: Cervical, supraclavicular, and axillary nodes normal.  Neurologic: Grossly normal        ASSESSMENT and PLAN    ICD-10-CM ICD-9-CM    1. Gastroesophageal reflux disease with esophagitis without hemorrhage  K21.00 530.81      530.10    2. Fibromyalgia  M79.7 729.1 metoprolol tartrate (LOPRESSOR) 50 mg tablet      traMADoL (ULTRAM) 50 mg tablet      10-DRUG SCREEN W/CONF      10-DRUG SCREEN W/CONF   3. Arthritis  M19.90 716.90 metoprolol tartrate (LOPRESSOR) 50 mg tablet      traMADoL (ULTRAM) 50 mg tablet      10-DRUG SCREEN W/CONF      10-DRUG SCREEN W/CONF   4. Gastroesophageal reflux disease with esophagitis  K21.00 530.11 esomeprazole (NEXIUM) 40 mg capsule   5. Opioid use, unspecified with unspecified opioid-induced disorder  F11.99 292.9      305.50    6. Essential hypertension  I10 401.9    7. History of migraine headaches  Z86.69 V12.49    8. Spinal stenosis of lumbosacral region  M48.07 724.02    9. Spondylosis of lumbar region without myelopathy or radiculopathy  M47.816 721.3    10. Vitamin D deficiency  E55.9 268.9    11. Osteopenia of lumbar spine  M85.88 733.90    12. History of migraine  Z86.69 V12.49      Diagnoses and all orders for this visit:    1. Gastroesophageal reflux disease with esophagitis without hemorrhage    2. Fibromyalgia  -     metoprolol tartrate (LOPRESSOR) 50 mg tablet; Take 1 Tablet by mouth two (2) times a day. -     traMADoL (ULTRAM) 50 mg tablet; Take 1 Tablet by mouth every six (6) hours as needed for Pain for up to 30 days. Max Daily Amount: 200 mg.  -     10-DRUG SCREEN W/CONF; Future    3. Arthritis  -     metoprolol tartrate (LOPRESSOR) 50 mg tablet; Take 1 Tablet by mouth two (2) times a day. -     traMADoL (ULTRAM) 50 mg tablet; Take 1 Tablet by mouth every six (6) hours as needed for Pain for up to 30 days. Max Daily Amount: 200 mg.  -     10-DRUG SCREEN W/CONF; Future    4.  Gastroesophageal reflux disease with esophagitis  -     esomeprazole (NEXIUM) 40 mg capsule; Take 1 Capsule by mouth two (2) times a day. 5. Opioid use, unspecified with unspecified opioid-induced disorder    6. Essential hypertension    7. History of migraine headaches    8. Spinal stenosis of lumbosacral region    9. Spondylosis of lumbar region without myelopathy or radiculopathy    10. Vitamin D deficiency    11. Osteopenia of lumbar spine    12. History of migraine      Follow-up and Dispositions    · Return in about 3 months (around 5/1/2022) for GERD, f/u Vitamin D deficiency, osteoarthritis. reviewed medications and side effects in detail  Please call my office if there are any questions- [phone]. Discussed expected course/resolution/complications of diagnosis in detail with patient. Medication risks/benefits/costs/interactions/alternatives discussed with patient. Pt was given an after visit summary which includes diagnoses, current medications & vitals. Pt expressed understanding with the diagnosis and plan. Patient to call if no better in 3 -4 days and prn new problems. BMI is significantly elevated- in the overweight range. I reviewed diet, exercise and weight control. Discussed weight control in detail, the importance of mainly decreased carbs, and for weight maintenance, exercise; discussed different diets and that it isn't as important to watch the type of foods as it is to decrease calorie intake no matter what type of diet you do, etc.     Total 30 minutes  re: Recommended a weekly \"heart check. \" I went into detail how to do this. Regular exercise is very important to your health; it helps mentally, physically, socially; it prevents injuries if done properly. Exercise, even as simple as walking 20-30 minutes daily has major benefits to your health even though your \"numbers\" are the same in the lab.  See if you can add this into your daily regimen and after a few months it will become a regular habit-\"just something you do,\" like brushing your teeth. A combination of aerobic exercise and strengthening and stretching is felt to be the best for you, so this should be your ultimate goal.   This can be done in the privacy of your home or in a group setting as at the gym  Some prefer having a , others prefer to do exercise in groups or individually. Do what \"works\" for you. You need to make it simple and \"fun,\" or you most likely will not continue it. Also, discussed symptoms of concern that were noted today in the note above, treatment options( including doing nothing), when to follow up before recommended time frame. Also, answered all questions. She apparently started a change in her diet back in July and has lost about 16 lbs since then. I encouraged her to continue with that. She uses the tramadol for her arthritis pain which affects most of her joints, especially her back, hands, and knees. Usually, one tramadol will help the pain and she averages about one a day, and sometimes combines it with Tylenol. She also takes metoprolol that was initially started for migraine prevention but also has found it helps her borderline BP. Because she is on Nexium 40 mg BID( which can decrease absorption of many nutrients). , she has been taking B12 and vitamin D for years. Her last level was checked about a year ago for the vitamin D. The B12 has not been check for several years, but when it was checked several years ago, it was in a high normal range. If she misses just one dose of the Nexium, her symptoms of reflux will return. She does admit to not always taking it related to meals and I encouraged her to consider that taking it 30-60 minutes before a meal whenever possible. I notified her that her tetanus was due and her Pneumovax 23 are due. Because her primary insurance is Medicare, she will not do the tetanus now( but will come in with next wound.  and she will wait to do her Pneumovax 23 when she does her physical this year. At that time, she will also schedule her first bone density. This document was written by Saima Lassiter, as dictated by Hai Oconnell MD.  I have reviewed and agree with the above note and have made corrections where appropriate Wes Hung M.D.

## 2022-02-03 LAB
AMPHETAMINES UR QL SCN: NEGATIVE NG/ML
BARBITURATES UR QL SCN: NEGATIVE NG/ML
BENZODIAZ UR QL SCN: NEGATIVE NG/ML
BZE UR QL SCN: NEGATIVE NG/ML
CANNABINOIDS UR QL SCN: NEGATIVE NG/ML
CREAT UR-MCNC: 137.5 MG/DL (ref 20–300)
METHADONE UR QL SCN: NEGATIVE NG/ML
OPIATES UR QL SCN: NEGATIVE NG/ML
OXYCODONE+OXYMORPHONE UR QL SCN: NEGATIVE NG/ML
PCP UR QL: NEGATIVE NG/ML
PH UR: 5.9 [PH] (ref 4.5–8.9)
PLEASE NOTE:, 733157: NORMAL
PROPOXYPH UR QL SCN: NEGATIVE NG/ML

## 2022-02-04 ENCOUNTER — TELEPHONE (OUTPATIENT)
Dept: NEUROLOGY | Age: 67
End: 2022-02-04

## 2022-03-18 PROBLEM — M47.816 SPONDYLOSIS OF LUMBAR REGION WITHOUT MYELOPATHY OR RADICULOPATHY: Status: ACTIVE | Noted: 2019-01-29

## 2022-03-18 PROBLEM — F11.99 OPIOID USE, UNSPECIFIED WITH UNSPECIFIED OPIOID-INDUCED DISORDER (HCC): Status: ACTIVE | Noted: 2022-02-01

## 2022-03-18 PROBLEM — R06.09 DOE (DYSPNEA ON EXERTION): Status: ACTIVE | Noted: 2019-09-04

## 2022-03-19 PROBLEM — M19.90 ARTHRITIS: Status: ACTIVE | Noted: 2019-09-04

## 2022-03-19 PROBLEM — R60.0 PEDAL EDEMA: Status: ACTIVE | Noted: 2019-09-04

## 2022-03-19 PROBLEM — Z86.69 HISTORY OF MIGRAINE: Status: ACTIVE | Noted: 2018-07-19

## 2022-03-19 PROBLEM — I10 ESSENTIAL HYPERTENSION: Status: ACTIVE | Noted: 2020-01-10

## 2022-03-20 PROBLEM — Z86.69 HISTORY OF MIGRAINE HEADACHES: Status: ACTIVE | Noted: 2017-07-20

## 2022-03-20 PROBLEM — E55.9 VITAMIN D DEFICIENCY: Status: ACTIVE | Noted: 2020-01-10

## 2022-03-20 PROBLEM — M85.88 OSTEOPENIA OF LUMBAR SPINE: Status: ACTIVE | Noted: 2019-01-29

## 2022-04-09 DIAGNOSIS — K21.00 GASTROESOPHAGEAL REFLUX DISEASE WITH ESOPHAGITIS: ICD-10-CM

## 2022-04-11 RX ORDER — ESOMEPRAZOLE MAGNESIUM 40 MG/1
CAPSULE, DELAYED RELEASE ORAL
Qty: 180 CAPSULE | Refills: 2 | Status: SHIPPED | OUTPATIENT
Start: 2022-04-11

## 2022-07-26 ENCOUNTER — OFFICE VISIT (OUTPATIENT)
Dept: NEUROLOGY | Age: 67
End: 2022-07-26
Payer: MEDICARE

## 2022-07-26 VITALS
BODY MASS INDEX: 26.87 KG/M2 | WEIGHT: 146 LBS | OXYGEN SATURATION: 99 % | HEART RATE: 71 BPM | HEIGHT: 62 IN | DIASTOLIC BLOOD PRESSURE: 76 MMHG | SYSTOLIC BLOOD PRESSURE: 118 MMHG

## 2022-07-26 DIAGNOSIS — M79.7 FIBROMYALGIA: ICD-10-CM

## 2022-07-26 DIAGNOSIS — G43.009 MIGRAINE WITHOUT AURA AND WITHOUT STATUS MIGRAINOSUS, NOT INTRACTABLE: Primary | ICD-10-CM

## 2022-07-26 PROCEDURE — 3017F COLORECTAL CA SCREEN DOC REV: CPT | Performed by: PSYCHIATRY & NEUROLOGY

## 2022-07-26 PROCEDURE — G8417 CALC BMI ABV UP PARAM F/U: HCPCS | Performed by: PSYCHIATRY & NEUROLOGY

## 2022-07-26 PROCEDURE — G8536 NO DOC ELDER MAL SCRN: HCPCS | Performed by: PSYCHIATRY & NEUROLOGY

## 2022-07-26 PROCEDURE — G9899 SCRN MAM PERF RSLTS DOC: HCPCS | Performed by: PSYCHIATRY & NEUROLOGY

## 2022-07-26 PROCEDURE — G8752 SYS BP LESS 140: HCPCS | Performed by: PSYCHIATRY & NEUROLOGY

## 2022-07-26 PROCEDURE — G8427 DOCREV CUR MEDS BY ELIG CLIN: HCPCS | Performed by: PSYCHIATRY & NEUROLOGY

## 2022-07-26 PROCEDURE — 1101F PT FALLS ASSESS-DOCD LE1/YR: CPT | Performed by: PSYCHIATRY & NEUROLOGY

## 2022-07-26 PROCEDURE — 99214 OFFICE O/P EST MOD 30 MIN: CPT | Performed by: PSYCHIATRY & NEUROLOGY

## 2022-07-26 PROCEDURE — 1090F PRES/ABSN URINE INCON ASSESS: CPT | Performed by: PSYCHIATRY & NEUROLOGY

## 2022-07-26 PROCEDURE — 1123F ACP DISCUSS/DSCN MKR DOCD: CPT | Performed by: PSYCHIATRY & NEUROLOGY

## 2022-07-26 PROCEDURE — G8510 SCR DEP NEG, NO PLAN REQD: HCPCS | Performed by: PSYCHIATRY & NEUROLOGY

## 2022-07-26 PROCEDURE — G8399 PT W/DXA RESULTS DOCUMENT: HCPCS | Performed by: PSYCHIATRY & NEUROLOGY

## 2022-07-26 PROCEDURE — G8754 DIAS BP LESS 90: HCPCS | Performed by: PSYCHIATRY & NEUROLOGY

## 2022-07-26 RX ORDER — DULOXETIN HYDROCHLORIDE 60 MG/1
60 CAPSULE, DELAYED RELEASE ORAL DAILY
Qty: 90 CAPSULE | Refills: 3 | Status: SHIPPED | OUTPATIENT
Start: 2022-07-26

## 2022-07-26 NOTE — PROGRESS NOTES
Neurology Clinic Follow up Note    Patient ID:  Jaren Guy  [id number]  73 y.o.  1955      Ms. Citlalli Rodriguez is here for follow up today of migraines, fibromyalgia. Last Appointment With Me:  1/26/2022    Lynne Nichols is presenting for evaluation of headaches. Headaches started during childhood with recent increasing frequency over the past 6-8 months. Location: L occipital with radiation to the frontal region  Character: throbbing  Intensity: On average 8/10  Frequency: 8-10x monthly  # HA free days per month: 15-20  Duration: 4+ hours  Aura: None  Associated Sx with HA: +nausea/vomiting, +phonophotophobia. Neurological ROS: Denies focal weakness, numbness or vision loss associated with headaches. +Dizziness/vertigo during recent ED visit with headache lasting 72h. Systemic ROS:   Caffeine use: 1-2 cups daily  H/O Head trauma: None  Depressive or anxiety Sx: None    Any change in pattern of HA? See above    Triggers: Unknown. Alleviating factors: Rest/sleep  FHx HA/migraine: Mother, sisters with migraines    Treatment so far: Imitrex PRN for rescue therapy-typically works well but not for her most recent severe headache, Tylenol PRN (2x weekly)  Prior preventatives: Metoprolol (started this >20 years ago)    Investigations so far: MRI Brain 12/30/2020 normal\"    Interval History:   Headaches are stable since last visit. She experiences 3-4 headaches monthly, less severe, relieved with Ubrelvy PRN. H/O fibromyalgia. Leg pains have improved with initiation of Cymbalta. Denies side effects with medication. PMHx/ PSHx/ FHx/ SHx:  Reviewed and unchanged previous visit.    Past Medical History:   Diagnosis Date    Arthritis 12/20/2010    Cancer [facility]) 2008    right breast cancer    Colon polyp     DCIS (ductal carcinoma in situ) of breast 12/20/2010    right/surgery and radiation    Fibromyalgia 12/20/2010    GERD (gastroesophageal reflux disease) 12/20/2010    History of migraine 7/19/2018 History of migraine headaches 7/20/2017    Migraine 12/20/2010    Nausea & vomiting          ROS:  Comprehensive review of systems negative except for as noted above. Objective:       Meds:  Current Outpatient Medications   Medication Sig Dispense Refill    metoprolol tartrate (LOPRESSOR) 50 mg tablet TAKE 1 TABLET BY MOUTH TWICE DAILY 180 Tablet 0    esomeprazole (NEXIUM) 40 mg capsule TAKE ONE CAPSULE BY MOUTH TWICE DAILY 180 Capsule 2    cholecalciferol (VITAMIN D3) (5000 Units /125 mcg) capsule Take 1 Capsule by mouth daily. traMADoL (ULTRAM) 50 mg tablet       DULoxetine (CYMBALTA) 60 mg capsule Take 1 Capsule by mouth daily. 90 Capsule 1    ubrogepant (Ubrelvy) 50 mg tablet Take 1 Tablet by mouth as needed for Migraine (Take at onset of migraine. Max dose 50mg/d.). 10 Tablet 2    celecoxib (CELEBREX) 200 mg capsule TAKE 1 CAPSULE BY MOUTH TWICE DAILY 180 Capsule 1    cyanocobalamin 1,000 mcg tablet Take 1,000 mcg by mouth daily. cycloSPORINE (RESTASIS) 0.05 % dpet Administer 1 Drop to both eyes two (2) times a day. Exam:  Visit Vitals  /76 (BP 1 Location: Left upper arm, BP Patient Position: Sitting)   Pulse 71   Ht 5' 2\" (1.575 m)   Wt 146 lb (66.2 kg)   SpO2 99%   BMI 26.70 kg/m²     NEUROLOGICAL EXAM:  General: Awake, alert, speech fluent  CN: PERRL, EOMI without nystagmus, VFF to confrontation, facial sensation and strength are normal and symmetric, hearing is intact to finger rub bilaterally, palate and tongue movements are intact and symmetric. Motor: Normal tone, bulk and strength bilaterally. Reflexes: 2/4 throughout  Coordination: FNF, SCOTT, HTS intact. Sensation: LT intact throughout. Gait: Normal-based and steady.       LABS  Results for orders placed or performed in visit on 02/01/22   10-DRUG SCREEN W/CONF   Result Value Ref Range    Amphetamine Screen, urine Negative Lecjgt=9909 ng/mL    Barbiturates Screen, urine Negative Awydud=073 ng/mL    Benzodiazepines Screen, urine Negative Wnjiog=649 ng/mL    Cannabinoid Screen, urine Negative Cutoff=20 ng/mL    Cocaine Metab. Screen, urine Negative Roxuws=692 ng/mL    Opiate Screen, urine Negative Gmurws=934 ng/mL    Oxycodone/Oxymorphone, urine Negative Muneuv=038 ng/mL    Phencyclidine Screen, urine Negative Cutoff=25 ng/mL    Methadone Screen, urine Negative Gzlvga=317 ng/mL    Propoxyphene Screen, urine Negative Rgemyc=992 ng/mL    Creatinine, urine 137.5 20.0 - 300.0 mg/dL    pH, urine 5.9 4.5 - 8.9      Please Note Comment         IMAGING:  MRI Results (most recent):  Results from Hospital Encounter encounter on 12/30/20   MRI BRAIN W WO CONT    Narrative *PRELIMINARY REPORT*    Indication: Atypical migraine, vertigo. EXAM: MRI brain with and without contrast.    COMPARISON: CT head earlier same day. Preliminary findings: No acute or subacute ischemia or other definite acute  intracranial abnormality is confirmed. ? Mild T2 and FLAIR hyperintensity in both  temporal lobes medially, of uncertain if any significance. Volumetric  T1-weighted images after contrast reveal? Mild prominence of meningeal thickness  over the clivus and upper cervical spine, of uncertain if any significance. Preliminary report was provided by Dr. Jules Wyman, the on-call radiologist, at  [address]    Final report to follow. *END PRELIMINARY REPORT*    EXAM:  MRI BRAIN W WO CONT    INDICATION:    atypical migraine w/vertigo    COMPARISON:  CT head 12/30/2020. CONTRAST: 15 ml Dotarem. TECHNIQUE:    Multiplanar multisequence acquisition without and with contrast of the brain. FINDINGS:  The ventricles are normal in size and position. The brain parenchyma has normal  signal characteristics for age. There is no acute infarct, hemorrhage,  extra-axial fluid collection, or mass effect. There is no cerebellar tonsillar  herniation. Expected arterial flow-voids are present. No evidence of abnormal  enhancement.  Incidental small right parietal developmental venous anomaly. The paranasal sinuses are clear. Trace left mastoid effusion. The orbital  contents are within normal limits. No significant osseous or scalp lesions are  identified. Impression IMPRESSION:   1. No acute or significant intracranial abnormality. EMG:  Impression:  Extensive electrodiagnostic evaluation of the left upper and lower extremity with related paraspinal muscles reveals no evidence of a generalized myopathy or disorder of neuromuscular junction transmission, as could be seen in myasthenia gravis or Lambert-Eaton myasthenic syndrome. Specifically, there are no myopathic appearing motor units, motor unit instability or myotonic discharges noted during this study. In addition, repetitive nerve stimulation does not reveal evidence of reproducible decrement. Assessment:     Encounter Diagnoses     ICD-10-CM ICD-9-CM   1. Migraine without aura and without status migrainosus, not intractable  G43.009 346.10   2. 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 MRI Brain WWO performed 12/30/20 following ED evaluation for severe headache with vertigo showing no acute intracranial pathology. Presentation most consistent with vertiginous migraine variant. Migraines were not responsive to betablockers/metoprolol. She noted a reduction in headache frequency after initiation of TCA therapy, but did not tolerate medication due to fatigue. Migraine frequency/severity has improved with initiation of Cymbalta. For rescue migraine treatment, will continue Ubrelvy due to prior ineffectiveness with triptans. In addition to her migraines, she previously reported chronic progressive lower extremity pain/myalgias, subjective weakness worse with activity or when climbing stairs limiting her ability to function.  Neurological examination appears non-focal. EMG myopathy protocol was normal. Thyroid studies, CK levels and AchR antibodies were also normal. Symptoms are likely a manifestation of her fibromyalgia. She has noted improvement with titration of Cymbalta which may assist with treatment of fibromyalgia as well as her migraines. She elects to continue with current therapy. Headache education  Discussed pathophysiology of headache. Discussed treatment options, both abortive and preventive medications. Instructed patient about medications and potential side effects. Discussed medication overuse headache and to limit use of analgesics to less than 2 doses per week. Plan:   Continue Cymbalta 60mg daily for headache ppx and fibromyalgia  Continue Ubrelvy 50mg PRN for migraine rescue therapy      Follow-up and Dispositions    Return in about 1 year (around 7/26/2023). I have discussed the diagnosis with the patient today and the intended plan as seen in the above orders with both the patient as well as referring provider and/or PCP via electronic correspondence. The patient has received an after-visit summary and questions were answered concerning future plans. I have discussed medication side effects and warnings with the patient as well.       Signed:  Esthela Darby DO  7/26/2022

## 2022-08-23 ENCOUNTER — TELEPHONE (OUTPATIENT)
Dept: FAMILY MEDICINE CLINIC | Age: 67
End: 2022-08-23

## 2022-08-24 ENCOUNTER — OFFICE VISIT (OUTPATIENT)
Dept: FAMILY MEDICINE CLINIC | Age: 67
End: 2022-08-24
Payer: MEDICARE

## 2022-08-24 VITALS
HEIGHT: 62 IN | OXYGEN SATURATION: 98 % | WEIGHT: 145 LBS | RESPIRATION RATE: 16 BRPM | BODY MASS INDEX: 26.68 KG/M2 | SYSTOLIC BLOOD PRESSURE: 114 MMHG | DIASTOLIC BLOOD PRESSURE: 70 MMHG | HEART RATE: 73 BPM | TEMPERATURE: 97.1 F

## 2022-08-24 DIAGNOSIS — M79.7 FIBROMYALGIA: ICD-10-CM

## 2022-08-24 DIAGNOSIS — I10 ESSENTIAL HYPERTENSION: Primary | ICD-10-CM

## 2022-08-24 DIAGNOSIS — K21.00 GASTROESOPHAGEAL REFLUX DISEASE WITH ESOPHAGITIS: ICD-10-CM

## 2022-08-24 DIAGNOSIS — Z86.69 HISTORY OF MIGRAINE HEADACHES: ICD-10-CM

## 2022-08-24 DIAGNOSIS — Z00.00 ENCOUNTER FOR INITIAL ANNUAL WELLNESS VISIT (AWV) IN MEDICARE PATIENT: ICD-10-CM

## 2022-08-24 DIAGNOSIS — L50.9 HIVES OF UNKNOWN ORIGIN: ICD-10-CM

## 2022-08-24 DIAGNOSIS — K21.00 GASTROESOPHAGEAL REFLUX DISEASE WITH ESOPHAGITIS WITHOUT HEMORRHAGE: ICD-10-CM

## 2022-08-24 DIAGNOSIS — Z71.89 ACP (ADVANCE CARE PLANNING): ICD-10-CM

## 2022-08-24 DIAGNOSIS — D05.11 DUCTAL CARCINOMA IN SITU (DCIS) OF RIGHT BREAST: ICD-10-CM

## 2022-08-24 DIAGNOSIS — M48.07 SPINAL STENOSIS OF LUMBOSACRAL REGION: ICD-10-CM

## 2022-08-24 DIAGNOSIS — K21.9 GASTROESOPHAGEAL REFLUX DISEASE WITHOUT ESOPHAGITIS: ICD-10-CM

## 2022-08-24 DIAGNOSIS — E55.9 VITAMIN D DEFICIENCY: ICD-10-CM

## 2022-08-24 PROCEDURE — 3017F COLORECTAL CA SCREEN DOC REV: CPT | Performed by: FAMILY MEDICINE

## 2022-08-24 PROCEDURE — G8536 NO DOC ELDER MAL SCRN: HCPCS | Performed by: FAMILY MEDICINE

## 2022-08-24 PROCEDURE — G9899 SCRN MAM PERF RSLTS DOC: HCPCS | Performed by: FAMILY MEDICINE

## 2022-08-24 PROCEDURE — G0463 HOSPITAL OUTPT CLINIC VISIT: HCPCS | Performed by: FAMILY MEDICINE

## 2022-08-24 PROCEDURE — G8417 CALC BMI ABV UP PARAM F/U: HCPCS | Performed by: FAMILY MEDICINE

## 2022-08-24 PROCEDURE — G8752 SYS BP LESS 140: HCPCS | Performed by: FAMILY MEDICINE

## 2022-08-24 PROCEDURE — 1123F ACP DISCUSS/DSCN MKR DOCD: CPT | Performed by: FAMILY MEDICINE

## 2022-08-24 PROCEDURE — G0438 PPPS, INITIAL VISIT: HCPCS | Performed by: FAMILY MEDICINE

## 2022-08-24 PROCEDURE — G8399 PT W/DXA RESULTS DOCUMENT: HCPCS | Performed by: FAMILY MEDICINE

## 2022-08-24 PROCEDURE — G8754 DIAS BP LESS 90: HCPCS | Performed by: FAMILY MEDICINE

## 2022-08-24 PROCEDURE — 1090F PRES/ABSN URINE INCON ASSESS: CPT | Performed by: FAMILY MEDICINE

## 2022-08-24 PROCEDURE — G8510 SCR DEP NEG, NO PLAN REQD: HCPCS | Performed by: FAMILY MEDICINE

## 2022-08-24 PROCEDURE — G8427 DOCREV CUR MEDS BY ELIG CLIN: HCPCS | Performed by: FAMILY MEDICINE

## 2022-08-24 PROCEDURE — 99215 OFFICE O/P EST HI 40 MIN: CPT | Performed by: FAMILY MEDICINE

## 2022-08-24 PROCEDURE — 1101F PT FALLS ASSESS-DOCD LE1/YR: CPT | Performed by: FAMILY MEDICINE

## 2022-08-24 RX ORDER — POLYETHYLENE GLYCOL 3350, SODIUM CHLORIDE, SODIUM BICARBONATE, POTASSIUM CHLORIDE 420; 11.2; 5.72; 1.48 G/4L; G/4L; G/4L; G/4L
POWDER, FOR SOLUTION ORAL
COMMUNITY
Start: 2022-08-20

## 2022-08-24 RX ORDER — SODIUM, POTASSIUM,MAG SULFATES 17.5-3.13G
SOLUTION, RECONSTITUTED, ORAL ORAL
COMMUNITY
Start: 2022-08-23

## 2022-08-24 NOTE — PROGRESS NOTES
Chief Complaint   Patient presents with    Skin Problem     Inner thigh red rashes with itching ( left thigh) , tingling and burning sensation . Bilateral legs     Mammogram     Scheduled :  [facility] :  first week of September 2022. Colonoscopy 12/2022     1. Have you been to the ER, urgent care clinic since your last visit? Hospitalized since your last visit? No    2. Have you seen or consulted any other health care providers outside of the [address] [place] [address] since your last visit? Include any pap smears or colon screening.  No

## 2022-08-24 NOTE — LETTER
Alyse Pichardo  RXN:[id number]   MR #:[id number]   NHQLLB:[facility]   Page 1 of 5        CONTROLLED SUBSTANCE AGREEMENT     I may be prescribed medications that are controlled substances as part  of my treatment plan for management of my medical condition(s). The goal of my treatment plan is to maintain and/or improve my health and wellbeing. Because controlled substances have an increased risk of abuse or harm, continual re-evaluation is needed determine if the goals of my treatment plan are being met for my safety and the safety of others. Tinoalis Jauregui  am entering into this Controlled Substance Agreement with my provider, __________________________________ at [facility] . I understand that successful treatment requires mutual trust and honesty between me and my provider. I understand that there are state and federal laws and regulations which apply to the medications that my provider may prescribe that must be followed. I understand there are risks and benefits ts of taking the medicines that my provider may prescribe. I understand and agree that following this Agreement is necessary in continuing my provider-patient relationship and success of my treatment plan. As a part of my treatment plan, I agree to the following:    COMMUNICATION:    1. I will communicate fully with my provider about my medical condition(s), including the effect on my daily life and how well my medications are helping. I will tell my provider all of the medications that I take for any reason, including medications I receive from another health care provider, and will notify my provider about all issues, problems or concerns, including any side effects, which may be related to my medications. I understand that this information allows my provider to adjust my treatment plan to help manage my medical condition.  I understand that this information will become part of my permanent medical record. 2. I will notify my provider if I have a history of alcohol/drug misuse/addiction or if I have had treatment for alcohol/drug addiction in the past, or if I have a new problem with or concern about alcohol/drug use/addiction, because this increases the likelihood of high risk behaviors and may lead to serious medical conditions. 3. Females Only: I will notify my provider if I am or become pregnant, or if I intend to become pregnant, or if I intend to breastfeed. I understand that communication of these issues with my provider is important, due to possible effects my medication could have on an unborn fetus or breastfeeding child. Initials_____      Name:Adriel Montoya   PLL:[birth year]   MR #:[id number]   Office:[facility]   Page 2 of 5       MISUSE OF MEDICATIONS / DRUGS:    1. I agree to take all controlled substances as prescribed, and will not misuse or abuse any controlled substances prescribed by my provider. For my safety, I will not increase the amount of medicine I take without first talking with and getting permission from my provider. 2. If I have a medical emergency, another health care provider may prescribe me medication. If I seek emergency treatment, I will notify my provider within seventy-two (72) hours. 3. I understand that my provider may discuss my use and/or possible misuse/abuse of controlled substances and alcohol, as appropriate, with any health care provider involved in my care, pharmacist or legal authority. ILLEGAL DRUGS:    1. I will not use illegal drugs of any kind, including but not limited to marijuana, heroin, cocaine, or any prescription drug which is not prescribed to me. DRUG DIVERSION / PRESCRIPTION FRAUD:    1. I will not share, sell, trade, give away, or otherwise misuse my prescriptions or medications. 2. I will not alter any prescriptions provided to me by my provider.     SINGLE PROVIDER:    1. I agree that all controlled substances that I take will be prescribed only by my provider (or his/her covering provider) under this Agreement. This agreement does not prevent me from seeking emergency medical treatment or receiving pain management related to a surgery. PROTECTING MEDICATIONS:    1. I am responsible for keeping my prescriptions and medications in a safe and secure place including safeguarding them from loss or theft. I understand that lost, stolen or damaged/destroyed prescriptions or medications will not be replaced. Initials____          Name:Adriel Starr   XGY:[birth year]   MR #:[id number]   [facility]   Page 3 of 5   PRESCRIPTION RENEWALS/REFILLS:    1. I will follow my controlled substance medication schedule as prescribed by my provider. 2. I understand and agree that I will make any requests for renewals or refills of my prescriptions only at the time of an office visit or during my providers regular office hours subject to the prescription refill requirements of the individual practice. 3. I understand that my provider may not call in prescriptions for controlled substances to my pharmacy. 4. I understand that my provider may adjust or discontinue these medications as deemed appropriate for my medical treatment plan. This Agreement does not guarantee the prescription of controlled medications. 5. I agree that if my medications are adjusted or discontinued, I will properly dispose of any remaining medications. I understand that I will be required to dispose of any remaining controlled medications prior to being provided with any prescriptions for other controlled medications. 6. I understand that the renewal of my prescription depends on my medical condition, my consistent participation, and my adherence with my treatment plan and this Agreement.     7. I understand that if I do not keep an appointment with my provider, I may not receive a renewal or refill for my controlled substance medication. PRESCRIPTION MONITORING / DRUG TESTIN. I understand that my provider may require me to provide urine, saliva or blood for testing at any time. I understand that this testing will be used to monitor for safety and adherence with my treatment plan and this Agreement. 2. I understand that my provider may ask me to provide an observed urine specimen, which means that a nurse or other health care provider may watch me provide urine, and I agree to cooperate if I am asked to provide an observed specimen. 3. I understand that if I do not provide urine, saliva or blood samples within two (2) hours of my providers request, or other timeframe decided by my provider, my treatment plan could be changed, or my prescriptions and medications may be changed or ended. 4. I understand that urine, saliva and blood test results will be a part of my permanent medical record. Initials_____        Name:Adriel Marr Holton   BR   MR #:[id number]   [address]   Page 4 of 5    5. I understand that my provider is required to obtain a copy of my State Prescription Monitoring Program () Report at any time in order to safely prescribe medications. 6. I will bring all of my prescribed controlled substance medications in their original bottles to all of my scheduled appointments. 7. I understand that my provider may ask me to come to the practice with all of my prescribed medications for a random pill count at any time. I agree to cooperate if I am asked to come in for a random pill count. I understand that if I do not arrive in the timeframe decided by my provider, my treatment plan could be changed, or my prescriptions and medications may be changed or ended.     COOPERATION WITH INVESTIGATIONS:    1. I authorize my provider and my pharmacy to cooperate fully with any local, state, or federal law enforcement agency in the investigation of any possible misuse, sale, or other diversion of my controlled substance prescriptions or medications. RISKS:    1. I understand that my level of consciousness may be affected from the use of controlled substances, and I understand that there are risks, benefits, effects and potential alternatives (including no treatment) to the medications that my provider has prescribed. 2. I understand that I may become drowsy, tired, dizzy, constipated, and sick to my stomach, or have changes in my mood or in my sleep while taking my medications. I have talked with my provider about these possible side effects, risks, benefits, and alternative treatments, and my provider has answered all of my questions. 3. I understand that I should not suddenly stop taking my medications without first speaking with my provider. I understand that if I suddenly stop taking my medications, I may experience nausea, vomiting, sweating,anxiety, sleeplessness, itching or other uncomfortable feelings. 4. I will not take my medications with alcohol of any kind, including beer, wine or liquor. I understand that drinking alcohol with my medications increases the chances of side effects, including breathing problems or even death. 5. I understand that if I have a history of alcoholism or other drug addiction I may be at increased risk of addiction to my medications. Signs of addiction might include craving, compulsive use, and continued use despite harm. Since addiction is a disease, I understand my provider may decide to change my medications and refer me to appropriate treatment services. I understand that this information would become part of my permanent medical record. Initials_____        Name:Adriel Marino Artur   LAT:[birth year]   MR #:[id number]   [address]   Page 5 of 5       6.  Females only: Children born to women who regularly take controlled substances are likely to have physical problems and suffer withdrawal symptoms at birth. If I am of child-bearing age, I understand that I should use safe and effective birth control while taking any controlled substances to avoid the impact of medications on an unborn fetus or  child. I agree to notify my provider immediately if I should become pregnant so that my treatment plan can be adjusted. 7. Males only: I understand that chronic use of controlled substances has been associated with low testosterone levels in males which may affect my mood, stamina, sexual desire, and general health. I understand that my provider may order the appropriate laboratory test to determine my testosterone level,and I agree to this testing. ADHERENCE:    1. I understand that if I do not adhere to this Agreement in any way, my provider may change my prescriptions, stop prescribing controlled substances or end our provider-patient relationship. 2. If my provider decides to stop prescribing medication, or decides to end our provider-patient relationship,my provider may require that I taper my medications slowly. If necessary, my provider may also provide a prescription for other medications to treat my withdrawal symptoms. UNDERSTANDING THIS AGREEMENT:    I understand that my provider may adjust or stop my prescriptions for controlled substances based on my medical condition and my treatment plan. I understand that this Agreement does not guarantee that I will be prescribed medications or controlled substances. I understand that controlled substances may be just one part  of my treatment plan. My initial on each page and my signature below shows that I have read each page of this Agreement, I have had an opportunity to ask questions, and all of my questions have been answered to my satisfaction by my provider.     By signing below, I agree to comply with this Agreement, and I understand that if I do not follow the Agreements listed above, my provider may stop    _________________________________________  Date/Time 8/24/2022 2:55 PM                 (Patient Signature)    ________________________________________    Date/Time 8/24/2022 2:55 PM   (Parent or Guardian Signature if <18 yrs)    _________________________________________ Date/Time 8/24/2022 2:55 PM   (Provider Signature)

## 2022-08-24 NOTE — PROGRESS NOTES
HISTORY OF PRESENT ILLNESS  HPI  Va Armijo is a 73 y.o. female with a history of migraine, GERD, colon polyp, osteopenia and osteoarthritis of spine, lumbar spondylosis, appendectomy, DCIS of breast and fibromyalgia, who presents to the office today for welcome to Medicare annual wellness visit f/u of these health problems. Pt had a bowl of cheerios and coffee this morning. A month ago, pt noticed a red itchy rash over her posterior knees, bilateral legs, bilateral thighs, and back. The itching came on 1.5 days after onset. After 1.5 weeks, the rash went away by treating with Benadryl cream, but the rash returned this past week, appearing over her medial thigh bilaterally and posteror knees bilaterally as well as her back. Pt has never had this rash before. Pt is taking no regular allergy medication. Pt has a colonoscopy scheduled in December. She has had multiple colonoscopies in the past with the last one done 3 years ago with finding of pre-cancerous polyps. She also has FHx of polyps and colon CA    Pt notes her dad  at 80 y.o. and her mom, who has hx of DM, is still living at 80 y.o. Pt mentions that her maternal grandparents developed DM in their 63's and many of her relatives developed DM. Pt notes half her maternal side of the family is normal weight and the other half is overweight    Pt has lost weight with dieting and has done this since 2021. She has noticed some discomfort in her joints of her fingers that come on related to frequent use or banging on the fingers. She endorses some swelling over the fingers. Pt has a mammogram scheduled for the first week of September. Pt denies unusual SOB, chest pain, and any recent ER visits or hospitalizations.        Past Medical History:   Diagnosis Date    Arthritis 2010    Cancer [facility])     right breast cancer    Colon polyp     DCIS (ductal carcinoma in situ) of breast 2010    right/surgery and radiation Fibromyalgia 2010    GERD (gastroesophageal reflux disease) 2010    History of migraine headaches 2017    Migraine 2010    Nausea & vomiting      Past Surgical History:   Procedure Laterality Date    COLONOSCOPY N/A 2016    COLONOSCOPY performed by Amanda Chávez MD at [facility] ENDOSCOPY    COLONOSCOPY N/A 2019    COLONOSCOPY performed by Roula Perry MD at [address] [place] [address]       x2    HX APPENDECTOMY      HX MASTECTOMY  2008    right,then reconstruction/implant     Current Outpatient Medications on File Prior to Visit   Medication Sig Dispense Refill    Suprep Bowel Prep Kit 17.5-3.13-1.6 gram solr oral solution       peg-electrolyte soln (NULYTELY) 420 gram solution       DULoxetine (CYMBALTA) 60 mg capsule Take 1 Capsule by mouth in the morning. 90 Capsule 3    ubrogepant (Ubrelvy) 50 mg tablet Take 1 Tablet by mouth as needed for Migraine (Take at onset of migraine. Max dose 50mg/d.). 10 Tablet 5    metoprolol tartrate (LOPRESSOR) 50 mg tablet TAKE 1 TABLET BY MOUTH TWICE DAILY 180 Tablet 0    esomeprazole (NEXIUM) 40 mg capsule TAKE ONE CAPSULE BY MOUTH TWICE DAILY 180 Capsule 2    cholecalciferol (VITAMIN D3) (5000 Units /125 mcg) capsule Take 1 Capsule by mouth daily. traMADoL (ULTRAM) 50 mg tablet Take 50 mg by mouth daily. cyanocobalamin 1,000 mcg tablet Take 1,000 mcg by mouth daily. cycloSPORINE (RESTASIS) 0.05 % dpet Administer 1 Drop to both eyes two (2) times a day. No current facility-administered medications on file prior to visit. Allergies   Allergen Reactions    Aspirin Nausea and Vomiting    Betadine [Povidone-Iodine] Rash    Nsaids (Non-Steroidal Anti-Inflammatory Drug) Nausea and Vomiting    Other Medication Nausea and Vomiting     General anes. .. - Pt does not know what this is.      Family History   Problem Relation Age of Onset    Coronary Art Dis Mother     Hypertension Mother     Heart Disease Mother Diabetes Mother     Pulmonary Embolism Mother     Coronary Art Dis Father     Heart Disease Father     Hypertension Father     Kidney Disease Father     Thyroid Disease Father     Stroke Maternal Grandmother 76    Diabetes Maternal Grandfather 80    COPD Maternal Grandfather     Hypertension Paternal Grandmother 80    Coronary Art Dis Paternal Grandfather 80    Hypertension Paternal Grandfather      Social History     Socioeconomic History    Marital status:    Tobacco Use    Smoking status: Never    Smokeless tobacco: Never   Vaping Use    Vaping Use: Never used   Substance and Sexual Activity    Alcohol use: No    Drug use: No    Sexual activity: Not Currently                 Review of Systems   Constitutional:  Negative for chills, diaphoresis, fever, malaise/fatigue and weight loss. HENT:  Negative for congestion, ear discharge, ear pain, hearing loss, nosebleeds, sore throat and tinnitus. Eyes:  Negative for blurred vision, double vision, photophobia, pain, discharge and redness. Respiratory:  Negative for cough, hemoptysis, sputum production, shortness of breath, wheezing and stridor. Cardiovascular:  Negative for chest pain, palpitations, orthopnea, claudication, leg swelling and PND. Gastrointestinal:  Negative for abdominal pain, blood in stool, constipation, diarrhea, heartburn, melena, nausea and vomiting. Genitourinary:  Negative for dysuria, flank pain, frequency, hematuria and urgency. Musculoskeletal:  Negative for back pain, falls, joint pain, myalgias and neck pain. Skin:  Positive for rash. Negative for itching. Neurological:  Negative for dizziness, tingling, tremors, sensory change, speech change, focal weakness, seizures, loss of consciousness, weakness and headaches. Endo/Heme/Allergies:  Negative for environmental allergies and polydipsia. Does not bruise/bleed easily.    Psychiatric/Behavioral:  Negative for depression, hallucinations, memory loss, substance abuse and suicidal ideas. The patient is not nervous/anxious and does not have insomnia. Results for orders placed or performed in visit on 91/19/13   METABOLIC PANEL, COMPREHENSIVE   Result Value Ref Range    Sodium 142 136 - 145 mmol/L    Potassium 4.9 3.5 - 5.1 mmol/L    Chloride 108 97 - 108 mmol/L    CO2 30 21 - 32 mmol/L    Anion gap 4 (L) 5 - 15 mmol/L    Glucose 103 (H) 65 - 100 mg/dL    BUN 18 6 - 20 MG/DL    Creatinine 0.86 0.55 - 1.02 MG/DL    BUN/Creatinine ratio 21 (H) 12 - 20      GFR est AA >60 >60 ml/min/1.73m2    GFR est non-AA >60 >60 ml/min/1.73m2    Calcium 9.9 8.5 - 10.1 MG/DL    Bilirubin, total 0.5 0.2 - 1.0 MG/DL    ALT (SGPT) 31 12 - 78 U/L    AST (SGOT) 18 15 - 37 U/L    Alk.  phosphatase 147 (H) 45 - 117 U/L    Protein, total 7.1 6.4 - 8.2 g/dL    Albumin 4.1 3.5 - 5.0 g/dL    Globulin 3.0 2.0 - 4.0 g/dL    A-G Ratio 1.4 1.1 - 2.2     LIPID PANEL   Result Value Ref Range    Cholesterol, total 174 <200 MG/DL    Triglyceride 80 <150 MG/DL    HDL Cholesterol 71 MG/DL    LDL, calculated 87 0 - 100 MG/DL    VLDL, calculated 16 MG/DL    CHOL/HDL Ratio 2.5 0.0 - 5.0     CBC W/O DIFF   Result Value Ref Range    WBC 8.9 3.6 - 11.0 K/uL    RBC 4.52 3.80 - 5.20 M/uL    HGB 14.0 11.5 - 16.0 g/dL    HCT 42.4 35.0 - 47.0 %    MCV 93.8 80.0 - 99.0 FL    MCH 31.0 26.0 - 34.0 PG    MCHC 33.0 30.0 - 36.5 g/dL    RDW 13.4 11.5 - 14.5 %    PLATELET 741 833 - 807 K/uL    MPV 9.6 8.9 - 12.9 FL    NRBC 0.0 0  WBC    ABSOLUTE NRBC 0.00 0.00 - 0.01 K/uL   URINALYSIS W/MICROSCOPIC   Result Value Ref Range    Color DARK YELLOW      Appearance CLEAR CLEAR      Specific gravity 1.025 1.003 - 1.030      pH (UA) 6.0 5.0 - 8.0      Protein TRACE (A) Negative mg/dL    Glucose Negative Negative mg/dL    Ketone TRACE (A) Negative mg/dL    Blood Negative Negative      Urobilinogen 0.2 0.2 - 1.0 EU/dL    Nitrites Negative Negative      Leukocyte Esterase TRACE (A) Negative      WBC 0-4 0 - 4 /hpf    RBC 5-10 0 - 5 /hpf Epithelial cells FEW FEW /lpf    Bacteria Negative Negative /hpf    Mucus 1+ (A) Negative /lpf    CA Oxalate crystals FEW (A) Negative      Hyaline cast 2-5 0 - 5 /lpf   SAMPLES BEING HELD   Result Value Ref Range    SAMPLES BEING HELD 1sst     COMMENT        Add-on orders for these samples will be processed based on acceptable specimen integrity and analyte stability, which may vary by analyte. BILIRUBIN, CONFIRM   Result Value Ref Range    Bilirubin UA, confirm Negative Negative             Physical Exam  Visit Vitals  /70 (BP 1 Location: Left arm, BP Patient Position: Sitting, BP Cuff Size: Adult)   Pulse 73   Temp 97.1 °F (36.2 °C) (Temporal)   Resp 16   Ht 5' 2\" (1.575 m)   Wt 145 lb (65.8 kg)   SpO2 98%   BMI 26.52 kg/m²         General:  Alert, cooperative, no distress, appears stated age. Head:  Normocephalic, without obvious abnormality, atraumatic. Eyes:  Conjunctivae/corneas clear. PERRL, EOMs intact. Fundi benign. Ears:  Normal TMs and external ear canals both ears. Nose: Nares normal. Septum midline. Mucosa normal. No drainage or sinus tenderness. Throat: Lips, mucosa, and tongue normal. Teeth and gums normal.   Neck: Supple, symmetrical, trachea midline, no adenopathy, thyroid: no enlargement/tenderness/nodules, no carotid bruit and no JVD. Back:   Symmetric, no curvature. ROM normal. No CVA tenderness. Lungs:   Clear to auscultation bilaterally. Chest wall:  No tenderness or deformity. Heart:  Regular rate and rhythm, S1, S2 normal, no murmur, click, rub or gallop. Abdomen:   Soft, non-tender. Bowel sounds normal. No masses,  No organomegaly. Extremities: Extremities normal, atraumatic, no cyanosis or edema. Pulses: 2+ and symmetric all extremities. Skin: Skin color, texture, turgor normal. No lesions. She has less than 2 cm in diameter patches of skin.  One on her left shoulder, one on her left lower thigh medially, one on the upper thigh and some resolving ones in the popliteal area left more than right. Each patch has some erythema with a papule in the middle but no pustule, no scab, and no vesicle. Lymph nodes: Cervical, supraclavicular, and axillary nodes normal.   Neurologic: CNII-XII intact. Normal strength, sensation and reflexes throughout. ASSESSMENT and PLAN    ICD-10-CM ICD-9-CM    1. Encounter for initial annual wellness visit (AWV) in Medicare patient  A90.78 G93.6 METABOLIC PANEL, COMPREHENSIVE      LIPID PANEL      CBC W/O DIFF      URINALYSIS W/MICROSCOPIC      METABOLIC PANEL, COMPREHENSIVE      LIPID PANEL      CBC W/O DIFF      URINALYSIS W/MICROSCOPIC      2. Gastroesophageal reflux disease with esophagitis  K21.00 530.11       3. Fibromyalgia  M79.7 729.1       4. History of migraine headaches  Z86.69 V12.49       5. Gastroesophageal reflux disease with esophagitis without hemorrhage  K21.00 530.81      530.10       6. Essential hypertension  I10 401.9       7. Vitamin D deficiency  E55.9 268.9       8. Spinal stenosis of lumbosacral region  M48.07 724.02       9. Ductal carcinoma in situ (DCIS) of right breast  D05.11 233.0       10. Gastroesophageal reflux disease without esophagitis  K21.9 530.81         Diagnoses and all orders for this visit:    1. Encounter for initial annual wellness visit (AWV) in Medicare patient  -     METABOLIC PANEL, COMPREHENSIVE; Future  -     LIPID PANEL; Future  -     CBC W/O DIFF; Future  -     URINALYSIS W/MICROSCOPIC; Future    2. Gastroesophageal reflux disease with esophagitis    3. Fibromyalgia    4. History of migraine headaches    5. Gastroesophageal reflux disease with esophagitis without hemorrhage    6. Essential hypertension    7. Vitamin D deficiency    8. Spinal stenosis of lumbosacral region    9. Ductal carcinoma in situ (DCIS) of right breast    10.  Gastroesophageal reflux disease without esophagitis    Other orders  -     SAMPLES BEING HELD  -     BILIRUBIN, CONFIRM        lab results and schedule of future lab studies reviewed with patient  reviewed diet, exercise and weight control  cardiovascular risk and specific lipid/LDL goals reviewed  reviewed medications and side effects in detail  Please call my office if there are any questions- [phone]. I will arrange for follow up after the lab tests done from today return  Recommended a weekly \"heart check. \" I went into detail how to do this. Call for refills on medications as needed. Discussed expected course/resolution/complications of diagnosis in detail with patient. Medication risks/benefits/costs/interactions/alternatives discussed with patient. Pt was given an after visit summary which includes diagnoses, current medications & vitals. Pt expressed understanding with the diagnosis and plan    BMI is significantly elevated- in the overweight range. I reviewed diet, exercise and weight control. Discussed weight control in detail, the importance of mainly decreased carbs, and for weight maintenance, exercise; discussed different diets and that it isn't as important to watch the type of foods as it is to decrease calorie intake no matter what type of diet you do, etc.   Total 30 minutes  re: Recommended a weekly \"heart check. \" I went into detail how to do this. Regular exercise is very important to your health; it helps mentally, physically, socially; it prevents injuries if done properly. Exercise, even as simple as walking 20-30 minutes daily has major benefits to your health even though your \"numbers\" are the same in the lab. See if you can add this into your daily regimen and after a few months it will become a regular habit-\"just something you do,\" like brushing your teeth.      A combination of aerobic exercise and strengthening and stretching is felt to be the best for you, so this should be your ultimate goal.   This can be done in the privacy of your home or in a group setting as at the gym  Some prefer having a , others prefer to do exercise in groups or individually. Do what \"works\" for you. You need to make it simple and \"fun,\" or you most likely will not continue it. Also, discussed symptoms of concern that were noted today in the note above, treatment options( including doing nothing), when to follow up before recommended time frame. Also, answered all questions. I told her we would go ahead with the rash which is clearly allergic in nature using Kenalog cream and let us know how that works for this. She should go ahead and try Allegra, Zyrtec, or Benadryl whenever she has a flare and restart it whenever she has the next flare. If it does not seem to be helping, she should call back and we can give her some advice on what to do. I congratulated her on her weight loss and encouraged her to continue with that. Also encouraged her to incorporate a weekly heart check into her routine. She notes she has increased her activity level in general a great deal over the past year, averaging over 10,000 steps a day at this point which was unusual for her prior to this year. This document was written by Gomez Montana, as dictated by Xavi Platt MD.   I have reviewed and agree with the above note and have made corrections where appropriate Wes Gilliland M.D. This is an Initial Medicare Annual Wellness Exam (AWV) (Performed 12 months after IPPE or effective date of Medicare Part B enrollment, Once in a lifetime)    I have reviewed the patient's medical history in detail and updated the computerized patient record. Assessment/Plan   Education and counseling provided:  Are appropriate based on today's review and evaluation    1. Essential hypertension  2. Encounter for initial annual wellness visit (AWV) in Medicare patient  -     METABOLIC PANEL, COMPREHENSIVE; Future  -     LIPID PANEL; Future  -     CBC W/O DIFF; Future  -     URINALYSIS W/MICROSCOPIC; Future  3. Gastroesophageal reflux disease with esophagitis  4. Fibromyalgia  5. History of migraine headaches  6. Gastroesophageal reflux disease with esophagitis without hemorrhage  7. Vitamin D deficiency  8. Spinal stenosis of lumbosacral region  9. Ductal carcinoma in situ (DCIS) of right breast  10. Gastroesophageal reflux disease without esophagitis  11. Hives of unknown origin  15. ACP (advance care planning)       Depression Risk Factor Screening     3 most recent PHQ Screens 8/24/2022   Little interest or pleasure in doing things Not at all   Feeling down, depressed, irritable, or hopeless Not at all   Total Score PHQ 2 0   Trouble falling or staying asleep, or sleeping too much Not at all   Feeling tired or having little energy Not at all   Poor appetite, weight loss, or overeating Not at all   Feeling bad about yourself - or that you are a failure or have let yourself or your family down Not at all   Trouble concentrating on things such as school, work, reading, or watching TV Not at all   Moving or speaking so slowly that other people could have noticed; or the opposite being so fidgety that others notice Not at all   Thoughts of being better off dead, or hurting yourself in some way Not at all   PHQ 9 Score 0   How difficult have these problems made it for you to do your work, take care of your home and get along with others Not difficult at all       Alcohol & Drug Abuse Risk Screen   Do you average more than 1 drink per night or more than 7 drinks a week?: (P) No  On any one occasion in the past three months have you had more than 3 drinks containing alcohol?: (P) No       Opioid Risk: (Low risk score <55, High risk score ?55)  Opioid risk score: 10      Click here to complete the Controlled Substance Monitoring SmartForm    Last PDMP Wes as Reviewed:  Review User Review Instant Review Result            Functional Ability and Level of Safety   Hearing:  Hearing: (P) Patient wears hearing aid   Activities of Daily Living:   The home contains: (P) handrails  Functional ADLs: (P) Patient does total self care  Ambulation:  Patient ambulates: (P) with no difficulty    Fall Risk:  Fall Risk Assessment, last 12 mths 8/24/2022   Able to walk? Yes   Fall in past 12 months? 0   Do you feel unsteady?  0   Are you worried about falling 0     Abuse Screen:  Do you ever feel afraid of your partner?: No  Are you in a relationship with someone who physically or mentally threatens you?: No  Is it safe for you to go home?: Yes      Cognitive Screening   Has your family/caregiver stated any concerns about your memory?: (P) No   Cognitive Screening: Normal - Mini Cog Test    Health Maintenance Due     Health Maintenance Due   Topic Date Due    DTaP/Tdap/Td series (1 - Tdap) Never done    Cervical cancer screen  Never done    Bone Densitometry (Dexa) Screening  09/23/2021    Pneumococcal 65+ years (1 - PCV) Never done    COVID-19 Vaccine (4 - Booster for Moderna series) 04/10/2022    Flu Vaccine (1) 09/01/2022       Patient Care Team   Patient Care Team:  Duke Patterson MD as PCP - Carly Kenny MD as PCP - [facility]    History     Patient Active Problem List   Diagnosis Code    DCIS (ductal carcinoma in situ) of breast-June 2008- mastectomy, then reconstruction D05.10    Migraine G43.909    GERD (gastroesophageal reflux disease) K21.9    Fibromyalgia M79.7    Colonic polyp-  lg @ hepatic flexure-4/2015 K63.5    History of migraine headaches Z86.69    History of migraine Z86.69    Osteopenia of lumbar spine M85.88    Osteoarthritis of spine M47.9    Spondylosis of lumbar region without myelopathy or radiculopathy M47.816    Pedal edema R60.0    KYLE (dyspnea on exertion) R06.00    Arthritis M19.90    Vitamin D deficiency E55.9    Essential hypertension I10    Opioid use, unspecified with unspecified opioid-induced disorder F11.99     Past Medical History:   Diagnosis Date    Arthritis 12/20/2010    Cancer [facility]) 2008    right breast cancer    Colon polyp DCIS (ductal carcinoma in situ) of breast 2010    right/surgery and radiation    Fibromyalgia 2010    GERD (gastroesophageal reflux disease) 2010    History of migraine headaches 2017    Migraine 2010    Nausea & vomiting       Past Surgical History:   Procedure Laterality Date    COLONOSCOPY N/A 2016    COLONOSCOPY performed by Rosy Farmer MD at [facility] ENDOSCOPY    COLONOSCOPY N/A 2019    COLONOSCOPY performed by Iam Granados MD at [facility]       x2    HX APPENDECTOMY      HX MASTECTOMY  2008    right,then reconstruction/implant     Current Outpatient Medications   Medication Sig Dispense Refill    Suprep Bowel Prep Kit 17.5-3.13-1.6 gram solr oral solution       peg-electrolyte soln (NULYTELY) 420 gram solution       DULoxetine (CYMBALTA) 60 mg capsule Take 1 Capsule by mouth in the morning. 90 Capsule 3    ubrogepant (Ubrelvy) 50 mg tablet Take 1 Tablet by mouth as needed for Migraine (Take at onset of migraine. Max dose 50mg/d.). 10 Tablet 5    metoprolol tartrate (LOPRESSOR) 50 mg tablet TAKE 1 TABLET BY MOUTH TWICE DAILY 180 Tablet 0    esomeprazole (NEXIUM) 40 mg capsule TAKE ONE CAPSULE BY MOUTH TWICE DAILY 180 Capsule 2    cholecalciferol (VITAMIN D3) (5000 Units /125 mcg) capsule Take 1 Capsule by mouth daily. traMADoL (ULTRAM) 50 mg tablet Take 50 mg by mouth daily. cyanocobalamin 1,000 mcg tablet Take 1,000 mcg by mouth daily. cycloSPORINE (RESTASIS) 0.05 % dpet Administer 1 Drop to both eyes two (2) times a day. celecoxib (CELEBREX) 200 mg capsule TAKE 1 CAPSULE BY MOUTH TWICE DAILY 180 Capsule 1     Allergies   Allergen Reactions    Aspirin Nausea and Vomiting    Betadine [Povidone-Iodine] Rash    Nsaids (Non-Steroidal Anti-Inflammatory Drug) Nausea and Vomiting    Other Medication Nausea and Vomiting     General anes. .. - Pt does not know what this is.        Family History   Problem Relation Age of Onset Coronary Art Dis Mother     Hypertension Mother     Heart Disease Mother     Diabetes Mother     Pulmonary Embolism Mother     Coronary Art Dis Father     Heart Disease Father     Hypertension Father     Kidney Disease Father     Thyroid Disease Father     Stroke Maternal Grandmother 76    Diabetes Maternal Grandfather 80    COPD Maternal Grandfather     Hypertension Paternal Grandmother 80    Coronary Art Dis Paternal Grandfather 80    Hypertension Paternal Grandfather      Social History     Tobacco Use    Smoking status: Never    Smokeless tobacco: Never   Substance Use Topics    Alcohol use: No       Krystal Cedillo MD

## 2022-08-25 DIAGNOSIS — M19.90 ARTHRITIS: ICD-10-CM

## 2022-08-25 LAB
ALBUMIN SERPL-MCNC: 4.1 G/DL (ref 3.5–5)
ALBUMIN/GLOB SERPL: 1.4 {RATIO} (ref 1.1–2.2)
ALP SERPL-CCNC: 147 U/L (ref 45–117)
ALT SERPL-CCNC: 31 U/L (ref 12–78)
ANION GAP SERPL CALC-SCNC: 4 MMOL/L (ref 5–15)
APPEARANCE UR: CLEAR
AST SERPL-CCNC: 18 U/L (ref 15–37)
BACTERIA URNS QL MICRO: NEGATIVE /HPF
BILIRUB SERPL-MCNC: 0.5 MG/DL (ref 0.2–1)
BILIRUB UR QL CFM: NEGATIVE
BUN SERPL-MCNC: 18 MG/DL (ref 6–20)
BUN/CREAT SERPL: 21 (ref 12–20)
CALCIUM SERPL-MCNC: 9.9 MG/DL (ref 8.5–10.1)
CAOX CRY URNS QL MICRO: ABNORMAL
CHLORIDE SERPL-SCNC: 108 MMOL/L (ref 97–108)
CHOLEST SERPL-MCNC: 174 MG/DL
CO2 SERPL-SCNC: 30 MMOL/L (ref 21–32)
COLOR UR: ABNORMAL
COMMENT, HOLDF: NORMAL
CREAT SERPL-MCNC: 0.86 MG/DL (ref 0.55–1.02)
EPITH CASTS URNS QL MICRO: ABNORMAL /LPF
ERYTHROCYTE [DISTWIDTH] IN BLOOD BY AUTOMATED COUNT: 13.4 % (ref 11.5–14.5)
GLOBULIN SER CALC-MCNC: 3 G/DL (ref 2–4)
GLUCOSE SERPL-MCNC: 103 MG/DL (ref 65–100)
GLUCOSE UR STRIP.AUTO-MCNC: NEGATIVE MG/DL
HCT VFR BLD AUTO: 42.4 % (ref 35–47)
HDLC SERPL-MCNC: 71 MG/DL
HDLC SERPL: 2.5 {RATIO} (ref 0–5)
HGB BLD-MCNC: 14 G/DL (ref 11.5–16)
HGB UR QL STRIP: NEGATIVE
HYALINE CASTS URNS QL MICRO: ABNORMAL /LPF (ref 0–5)
KETONES UR QL STRIP.AUTO: ABNORMAL MG/DL
LDLC SERPL CALC-MCNC: 87 MG/DL (ref 0–100)
LEUKOCYTE ESTERASE UR QL STRIP.AUTO: ABNORMAL
MCH RBC QN AUTO: 31 PG (ref 26–34)
MCHC RBC AUTO-ENTMCNC: 33 G/DL (ref 30–36.5)
MCV RBC AUTO: 93.8 FL (ref 80–99)
MUCOUS THREADS URNS QL MICRO: ABNORMAL /LPF
NITRITE UR QL STRIP.AUTO: NEGATIVE
NRBC # BLD: 0 K/UL (ref 0–0.01)
NRBC BLD-RTO: 0 PER 100 WBC
PH UR STRIP: 6 [PH] (ref 5–8)
PLATELET # BLD AUTO: 294 K/UL (ref 150–400)
PMV BLD AUTO: 9.6 FL (ref 8.9–12.9)
POTASSIUM SERPL-SCNC: 4.9 MMOL/L (ref 3.5–5.1)
PROT SERPL-MCNC: 7.1 G/DL (ref 6.4–8.2)
PROT UR STRIP-MCNC: ABNORMAL MG/DL
RBC # BLD AUTO: 4.52 M/UL (ref 3.8–5.2)
RBC #/AREA URNS HPF: ABNORMAL /HPF (ref 0–5)
SAMPLES BEING HELD,HOLD: NORMAL
SODIUM SERPL-SCNC: 142 MMOL/L (ref 136–145)
SP GR UR REFRACTOMETRY: 1.02 (ref 1–1.03)
TRIGL SERPL-MCNC: 80 MG/DL (ref ?–150)
UROBILINOGEN UR QL STRIP.AUTO: 0.2 EU/DL (ref 0.2–1)
VLDLC SERPL CALC-MCNC: 16 MG/DL
WBC # BLD AUTO: 8.9 K/UL (ref 3.6–11)
WBC URNS QL MICRO: ABNORMAL /HPF (ref 0–4)

## 2022-08-26 RX ORDER — CELECOXIB 200 MG/1
CAPSULE ORAL
Qty: 180 CAPSULE | Refills: 1 | Status: SHIPPED | OUTPATIENT
Start: 2022-08-26

## 2022-09-11 NOTE — PATIENT INSTRUCTIONS
Medicare Wellness Visit, Female     The best way to live healthy is to have a lifestyle where you eat a well-balanced diet, exercise regularly, limit alcohol use, and quit all forms of tobacco/nicotine, if applicable. Regular preventive services are another way to keep healthy. Preventive services (vaccines, screening tests, monitoring & exams) can help personalize your care plan, which helps you manage your own care. Screening tests can find health problems at the earliest stages, when they are easiest to treat. [facility] follows the current, evidence-based guidelines published by the [facility] Renaldo Bender ([facility]) when recommending preventive services for our patients. Because we follow these guidelines, sometimes recommendations change over time as research supports it. (For example, mammograms used to be recommended annually. Even though Medicare will still pay for an annual mammogram, the newer guidelines recommend a mammogram every two years for women of average risk). Of course, you and your doctor may decide to screen more often for some diseases, based on your risk and your co-morbidities (chronic disease you are already diagnosed with). Preventive services for you include:  - Medicare offers their members a free annual wellness visit, which is time for you and your primary care provider to discuss and plan for your preventive service needs. Take advantage of this benefit every year!  -All adults over the age of 72 should receive the recommended pneumonia vaccines. Current USPSTF guidelines recommend a series of two vaccines for the best pneumonia protection.   -All adults should have a flu vaccine yearly and a tetanus vaccine every 10 years.   -All adults age 48 and older should receive the shingles vaccines (series of two vaccines).       -All adults age 38-68 who are overweight should have a diabetes screening test once every three years.   -All adults born between [birth year] and 1965 should be screened once for Hepatitis C.  -Other screening tests and preventive services for persons with diabetes include: an eye exam to screen for diabetic retinopathy, a kidney function test, a foot exam, and stricter control over your cholesterol.   -Cardiovascular screening for adults with routine risk involves an electrocardiogram (ECG) at intervals determined by your doctor.   -Colorectal cancer screenings should be done for adults age 54-65 with no increased risk factors for colorectal cancer. There are a number of acceptable methods of screening for this type of cancer. Each test has its own benefits and drawbacks. Discuss with your doctor what is most appropriate for you during your annual wellness visit. The different tests include: colonoscopy (considered the best screening method), a fecal occult blood test, a fecal DNA test, and sigmoidoscopy.    -A bone mass density test is recommended when a woman turns 65 to screen for osteoporosis. This test is only recommended one time, as a screening. Some providers will use this same test as a disease monitoring tool if you already have osteoporosis. -Breast cancer screenings are recommended every other year for women of normal risk, age 54-69.  -Cervical cancer screenings for women over age 72 are only recommended with certain risk factors.      Here is a list of your current Health Maintenance items (your personalized list of preventive services) with a due date:  Health Maintenance Due   Topic Date Due    DTaP/Tdap/Td  (1 - Tdap) Never done    Cervical cancer screen  Never done    Bone Mineral Density   09/23/2021    Pneumococcal Vaccine (1 - PCV) Never done    COVID-19 Vaccine (4 - Booster for Moderna series) 04/10/2022    Yearly Flu Vaccine (1) 09/01/2022

## 2022-10-31 ENCOUNTER — TELEPHONE (OUTPATIENT)
Dept: FAMILY MEDICINE CLINIC | Age: 67
End: 2022-10-31

## 2022-10-31 DIAGNOSIS — Z86.69 HISTORY OF MIGRAINE HEADACHES: ICD-10-CM

## 2022-10-31 DIAGNOSIS — M47.816 SPONDYLOSIS OF LUMBAR REGION WITHOUT MYELOPATHY OR RADICULOPATHY: Primary | ICD-10-CM

## 2022-11-01 RX ORDER — TRAMADOL HYDROCHLORIDE 50 MG/1
50 TABLET ORAL
Qty: 90 TABLET | Refills: 1 | Status: SHIPPED | OUTPATIENT
Start: 2022-11-01 | End: 2022-12-01

## 2022-12-12 RX ORDER — VITAMIN B COMPLEX
2500 TABLET ORAL DAILY
COMMUNITY

## 2022-12-12 RX ORDER — TRAMADOL HYDROCHLORIDE 50 MG/1
50 TABLET ORAL
COMMUNITY

## 2022-12-19 ENCOUNTER — HOSPITAL ENCOUNTER (OUTPATIENT)
Age: 67
Setting detail: OUTPATIENT SURGERY
Discharge: HOME | End: 2022-12-19
Attending: INTERNAL MEDICINE | Admitting: INTERNAL MEDICINE
Payer: MEDICARE

## 2022-12-19 ENCOUNTER — ANESTHESIA (OUTPATIENT)
Dept: ENDOSCOPY | Age: 67
End: 2022-12-19
Payer: MEDICARE

## 2022-12-19 ENCOUNTER — ANESTHESIA EVENT (OUTPATIENT)
Dept: ENDOSCOPY | Age: 67
End: 2022-12-19
Payer: MEDICARE

## 2022-12-19 VITALS
TEMPERATURE: 98.6 F | OXYGEN SATURATION: 97 % | SYSTOLIC BLOOD PRESSURE: 113 MMHG | DIASTOLIC BLOOD PRESSURE: 72 MMHG | HEIGHT: 62 IN | WEIGHT: 147 LBS | RESPIRATION RATE: 15 BRPM | BODY MASS INDEX: 27.05 KG/M2 | HEART RATE: 79 BPM

## 2022-12-19 PROCEDURE — 76060000031 HC ANESTHESIA FIRST 0.5 HR: Performed by: INTERNAL MEDICINE

## 2022-12-19 PROCEDURE — 74011250636 HC RX REV CODE- 250/636: Performed by: NURSE ANESTHETIST, CERTIFIED REGISTERED

## 2022-12-19 PROCEDURE — 76040000019: Performed by: INTERNAL MEDICINE

## 2022-12-19 RX ORDER — NALOXONE HYDROCHLORIDE 0.4 MG/ML
0.4 INJECTION, SOLUTION INTRAMUSCULAR; INTRAVENOUS; SUBCUTANEOUS
Status: DISCONTINUED | OUTPATIENT
Start: 2022-12-19 | End: 2022-12-19 | Stop reason: HOSPADM

## 2022-12-19 RX ORDER — PROPOFOL 10 MG/ML
INJECTION, EMULSION INTRAVENOUS AS NEEDED
Status: DISCONTINUED | OUTPATIENT
Start: 2022-12-19 | End: 2022-12-19 | Stop reason: HOSPADM

## 2022-12-19 RX ORDER — DEXTROMETHORPHAN/PSEUDOEPHED 2.5-7.5/.8
1.2 DROPS ORAL
Status: DISCONTINUED | OUTPATIENT
Start: 2022-12-19 | End: 2022-12-19 | Stop reason: HOSPADM

## 2022-12-19 RX ORDER — SODIUM CHLORIDE 9 MG/ML
50 INJECTION, SOLUTION INTRAVENOUS CONTINUOUS
Status: DISCONTINUED | OUTPATIENT
Start: 2022-12-19 | End: 2022-12-19 | Stop reason: HOSPADM

## 2022-12-19 RX ORDER — SODIUM CHLORIDE 9 MG/ML
INJECTION, SOLUTION INTRAVENOUS
Status: DISCONTINUED | OUTPATIENT
Start: 2022-12-19 | End: 2022-12-19 | Stop reason: HOSPADM

## 2022-12-19 RX ORDER — ATROPINE SULFATE 0.1 MG/ML
0.5 INJECTION INTRAVENOUS
Status: DISCONTINUED | OUTPATIENT
Start: 2022-12-19 | End: 2022-12-19 | Stop reason: HOSPADM

## 2022-12-19 RX ORDER — SODIUM CHLORIDE 0.9 % (FLUSH) 0.9 %
5-40 SYRINGE (ML) INJECTION EVERY 8 HOURS
Status: DISCONTINUED | OUTPATIENT
Start: 2022-12-19 | End: 2022-12-19 | Stop reason: HOSPADM

## 2022-12-19 RX ORDER — EPINEPHRINE 0.1 MG/ML
1 INJECTION INTRACARDIAC; INTRAVENOUS
Status: DISCONTINUED | OUTPATIENT
Start: 2022-12-19 | End: 2022-12-19 | Stop reason: HOSPADM

## 2022-12-19 RX ORDER — SODIUM CHLORIDE 0.9 % (FLUSH) 0.9 %
5-40 SYRINGE (ML) INJECTION AS NEEDED
Status: DISCONTINUED | OUTPATIENT
Start: 2022-12-19 | End: 2022-12-19 | Stop reason: HOSPADM

## 2022-12-19 RX ORDER — FLUMAZENIL 0.1 MG/ML
0.2 INJECTION INTRAVENOUS
Status: DISCONTINUED | OUTPATIENT
Start: 2022-12-19 | End: 2022-12-19 | Stop reason: HOSPADM

## 2022-12-19 RX ADMIN — SODIUM CHLORIDE: 900 INJECTION, SOLUTION INTRAVENOUS at 09:19

## 2022-12-19 RX ADMIN — PROPOFOL 100 MG: 10 INJECTION, EMULSION INTRAVENOUS at 09:26

## 2022-12-19 NOTE — ANESTHESIA PREPROCEDURE EVALUATION
Relevant Problems   No relevant active problems       Anesthetic History   No history of anesthetic complications            Review of Systems / Medical History  Patient summary reviewed, nursing notes reviewed and pertinent labs reviewed    Pulmonary  Within defined limits                 Neuro/Psych   Within defined limits           Cardiovascular  Within defined limits  Hypertension                   GI/Hepatic/Renal  Within defined limits   GERD           Endo/Other  Within defined limits      Arthritis and cancer     Other Findings              Physical Exam    Airway  Mallampati: II  TM Distance: > 6 cm  Neck ROM: normal range of motion   Mouth opening: Normal     Cardiovascular  Regular rate and rhythm,  S1 and S2 normal,  no murmur, click, rub, or gallop             Dental  No notable dental hx       Pulmonary  Breath sounds clear to auscultation               Abdominal  GI exam deferred       Other Findings            Anesthetic Plan    ASA: 2  Anesthesia type: MAC            Anesthetic plan and risks discussed with: Patient

## 2022-12-19 NOTE — H&P
[address]  [address], [address]  [phone]                                History and Physical     NAME: Jessica Mckay   :  1955   MRN:  [id number]     HPI:  The patient was seen and examined. Past Surgical History:   Procedure Laterality Date    COLONOSCOPY N/A 2016    COLONOSCOPY performed by Carina Gonsales MD at [facility] ENDOSCOPY    COLONOSCOPY N/A 2019    COLONOSCOPY performed by Flaco Mcnally MD at [address] [place] [address]       x2    HX APPENDECTOMY      HX MASTECTOMY  2008    right,then reconstruction/implant     Past Medical History:   Diagnosis Date    Arthritis 2010    Cancer [facility])     right breast cancer    Colon polyp     DCIS (ductal carcinoma in situ) of breast 2010    right/surgery and radiation    Fibromyalgia 2010    GERD (gastroesophageal reflux disease) 2010    History of migraine headaches 2017    Migraine 2010    Nausea & vomiting      Social History     Tobacco Use    Smoking status: Never    Smokeless tobacco: Never   Vaping Use    Vaping Use: Never used   Substance Use Topics    Alcohol use: No    Drug use: No     Allergies   Allergen Reactions    Aspirin Nausea and Vomiting    Betadine [Povidone-Iodine] Rash    Nsaids (Non-Steroidal Anti-Inflammatory Drug) Nausea and Vomiting    Other Medication Nausea and Vomiting     General anes. ..      Family History   Problem Relation Age of Onset    Coronary Art Dis Mother     Hypertension Mother     Heart Disease Mother     Diabetes Mother     Pulmonary Embolism Mother     Coronary Art Dis Father     Heart Disease Father     Hypertension Father     Kidney Disease Father     Thyroid Disease Father     Stroke Maternal Grandmother 76    Diabetes Maternal Grandfather 80    COPD Maternal Grandfather     Hypertension Paternal Grandmother 80    Coronary Art Dis Paternal Grandfather 80    Hypertension Paternal Grandfather      Current Facility-Administered Medications   Medication Dose Route Frequency    0.9% sodium chloride infusion  50 mL/hr IntraVENous CONTINUOUS    sodium chloride (NS) flush 5-40 mL  5-40 mL IntraVENous Q8H    sodium chloride (NS) flush 5-40 mL  5-40 mL IntraVENous PRN    naloxone (NARCAN) injection 0.4 mg  0.4 mg IntraVENous Multiple    flumazeniL (ROMAZICON) 0.1 mg/mL injection 0.2 mg  0.2 mg IntraVENous Multiple    simethicone (MYLICON) 06XD/9.6YO oral drops 80 mg  1.2 mL Oral Multiple    atropine injection 0.5 mg  0.5 mg IntraVENous ONCE PRN    EPINEPHrine (ADRENALIN) 0.1 mg/mL syringe 1 mg  1 mg Endoscopically ONCE PRN     Facility-Administered Medications Ordered in Other Encounters   Medication Dose Route Frequency    0.9% sodium chloride infusion   IntraVENous CONTINUOUS         PHYSICAL EXAM:  General: WD, WN. Alert, cooperative, no acute distress    HEENT: NC, Atraumatic. PERRLA, EOMI. Anicteric sclerae. Lungs:  CTA Bilaterally. No Wheezing/Rhonchi/Rales. Heart:  Regular  rhythm,  No murmur, No Rubs, No Gallops  Abdomen: Soft, Non distended, Non tender. +Bowel sounds, no HSM  Extremities: No c/c/e  Neurologic:  CN 2-12 gi, Alert and oriented X 3. No acute neurological distress   Psych:   Good insight. Not anxious nor agitated. The heart, lungs and mental status were satisfactory for the administration of MAC sedation and for the procedure. Mallampati score: 2     The patient was counseled at length about the risks of bonny Covid-19 in the sallie-operative and post-operative states including the recovery window of their procedure. The patient was made aware that bonny Covid-19 after a surgical procedure may worsen their prognosis for recovering from the virus and lend to a higher morbidity and or mortality risk. The patient was given the options of postponing their procedure. All of the risks, benefits, and alternatives were discussed.  The patient does  wish to proceed with the procedure.       Assessment:   Personal history colon polyps  FH colon cancer    Plan:   Endoscopic procedure  MAC sedation

## 2022-12-19 NOTE — PROCEDURES
[address] [facility]  [address] [place] [address] [address], [address]  [phone]                              Colonoscopy Procedure Note      Indications:    Family history of coloretal cancer (screening only), Personal history of colon polyps (screening only)     :  Janet Houston MD    Surgical Assistant: Endoscopy Technician-1: Tatum Miles  Endoscopy RN-1: Cheyenne Rader RN    Implants: none    Referring Provider: Adam Galeano MD    Sedation:  MAC anesthesia    Procedure Details:  After informed consent was obtained with all risks and benefits of procedure explained and preoperative exam completed, the patient was taken to the endoscopy suite and placed in the left lateral decubitus position. Upon sequential sedation as per above, a digital rectal exam was performed  And was normal.  The Olympus videocolonoscope  was inserted in the rectum and carefully advanced to the sigmoid colon. The quality of preparation was inadequate. The colonoscope was slowly withdrawn with careful evaluation between folds. Retroflexion in the rectum was performed and was normal..     Findings:   Rectum: no mucosal lesion appreciated  stool present;;  Sigmoid: no mucosal lesion appreciated  stool present;;  Descending Colon: not intubated  Transverse Colon: not intubated  Ascending Colon: not intubated  Cecum: not intubated  Terminal Ileum: not intubated    Interventions:  none    Specimen Removed:  * No specimens in log *    Complications: None. EBL:  None. Recommendations:   -Repeat colonoscopy in 3 months with double prep. -High fiber diet.     -Resume normal medication(s). Discharge Disposition:  Home in the company of a  when able to ambulate.     Janet Houston MD  12/19/2022  9:31 AM

## 2022-12-19 NOTE — PROGRESS NOTES

## 2022-12-19 NOTE — ANESTHESIA POSTPROCEDURE EVALUATION
Procedure(s):  COLONOSCOPY. MAC    Anesthesia Post Evaluation        Patient location during evaluation: PACU  Patient participation: complete - patient participated  Level of consciousness: awake and alert  Pain management: adequate  Airway patency: patent  Anesthetic complications: no  Cardiovascular status: acceptable  Respiratory status: acceptable  Hydration status: acceptable  Comments: I have seen and evaluated the patient and is ready for discharge. Alena Hung MD    Post anesthesia nausea and vomiting:  none      INITIAL Post-op Vital signs:   Vitals Value Taken Time   /72 12/19/22 0950   Temp 37 °C (98.6 °F) 12/19/22 0935   Pulse 76 12/19/22 0952   Resp 18 12/19/22 0952   SpO2 97 % 12/19/22 0950   Vitals shown include unvalidated device data.

## 2022-12-19 NOTE — DISCHARGE INSTRUCTIONS
[address]  [address] [facility]                                  Lane Speaks  [id number]  1955    COLON DISCHARGE INSTRUCTIONS    DISCOMFORT:  Redness at IV site- apply warm compress to area; if redness or soreness persist- contact your physician  There may be a slight amount of blood passed from the rectum  Gaseous discomfort- walking, belching will help relieve any discomfort      DIET:   High fiber diet. - however -  remember your colon is empty and a heavy meal will produce gas. Avoid these foods:  vegetables, fried / greasy foods, carbonated drinks for today  You may not  drink alcoholic beverages for at least 12 hours     ACTIVITY:  It is recommended that you spend the remainder of the day resting -  avoid any strenuous activity. You may not operate a vehicle for 12 hours  You may not  engage in an occupation involving machinery or appliances for rest of today    Avoid making any critical decisions for at least 24 hour    CALL M.D. ANY SIGN OF:   Increasing pain, nausea, vomiting  Abdominal distension (swelling)  New increased bleeding (oral or rectal)  Fever (chills)  Pain in chest area  Bloody discharge from nose or mouth  Shortness of breath    You may not  take any Advil, Aspirin, Ibuprofen, Motrin, Aleve, or Goodys for 3 days, ONLY  Tylenol as needed for pain. Post procedure diagnosis: Inadequate prep      Post-procedure recommendations:  -Repeat colonoscopy in 3 months with double prep. -High fiber diet.     -Resume normal medication(s). Follow-up Instructions:    Call Dr. Rey Sarmiento for any questions or problems. If we took a biopsy please call the office within 2 weeks to discuss your  pathology results. Telephone # [phone]         Learning About Coronavirus [phone])  Coronavirus [phone]): Overview  What is coronavirus (COVID-19)? The coronavirus disease (COVID-19) is caused by a virus.  It is an illness that was first found in [place], [place], in December 2019. It has since spread worldwide. The virus can cause fever, cough, and trouble breathing. In severe cases, it can cause pneumonia and make it hard to breathe without help. It can cause death. Coronaviruses are a large group of viruses. They cause the common cold. They also cause more serious illnesses like Middle East respiratory syndrome (MERS) and severe acute respiratory syndrome (SARS). COVID-19 is caused by a novel coronavirus. That means it's a new type that has not been seen in people before. This virus spreads person-to-person through droplets from coughing and sneezing. It can also spread when you are close to someone who is infected. And it can spread when you touch something that has the virus on it, such as a doorknob or a tabletop. What can you do to protect yourself from coronavirus (COVID-19)? The best way to protect yourself from getting sick is to: Avoid areas where there is an outbreak. Avoid contact with people who may be infected. Wash your hands often with soap or alcohol-based hand sanitizers. Avoid crowds and try to stay at least 6 feet away from other people. Wash your hands often, especially after you cough or sneeze. Use soap and water, and scrub for at least 20 seconds. If soap and water aren't available, use an alcohol-based hand . Avoid touching your mouth, nose, and eyes. What can you do to avoid spreading the virus to others? To help avoid spreading the virus to others:  Cover your mouth with a tissue when you cough or sneeze. Then throw the tissue in the trash. Use a disinfectant to clean things that you touch often. Stay home if you are sick or have been exposed to the virus. Don't go to school, work, or public areas. And don't use public transportation. If you are sick:  Leave your home only if you need to get medical care. But call the doctor's office first so they know you're coming.  And wear a face mask, if you have one. If you have a face mask, wear it whenever you're around other people. It can help stop the spread of the virus when you cough or sneeze. Clean and disinfect your home every day. Use household  and disinfectant wipes or sprays. Take special care to clean things that you grab with your hands. These include doorknobs, remote controls, phones, and handles on your refrigerator and microwave. And don't forget countertops, tabletops, bathrooms, and computer keyboards. When to call for help  Call 911 anytime you think you may need emergency care. For example, call if:  You have severe trouble breathing. (You can't talk at all.)  You have constant chest pain or pressure. You are severely dizzy or lightheaded. You are confused or can't think clearly. Your face and lips have a blue color. You pass out (lose consciousness) or are very hard to wake up. Call your doctor now if you develop symptoms such as:  Shortness of breath. Fever. Cough. If you need to get care, call ahead to the doctor's office for instructions before you go. Make sure you wear a face mask, if you have one, to prevent exposing other people to the virus. Where can you get the latest information? The following health organizations are tracking and studying this virus. Their websites contain the most up-to-date information. Liu Daniele also learn what to do if you think you may have been exposed to the virus. U.S. Centers for Disease Control and Prevention (CDC): The CDC provides updated news about the disease and travel advice. The website also tells you how to prevent the spread of infection. www.cdc.gov  World Health Organization [facility]): WHO offers information about the virus outbreaks. WHO also has travel advice. www.who.int  Current as of: April 1, 2020               Content Version: 12.4  © 2006-2020 Health[employer].    Care instructions adapted under license by your healthcare professional. If you have questions about a medical condition or this instruction, always ask your healthcare professional. [place] [address] any warranty or liability for your use of this information.

## 2022-12-29 DIAGNOSIS — K21.00 GASTROESOPHAGEAL REFLUX DISEASE WITH ESOPHAGITIS: ICD-10-CM

## 2022-12-29 DIAGNOSIS — M79.7 FIBROMYALGIA: ICD-10-CM

## 2022-12-29 DIAGNOSIS — M19.90 ARTHRITIS: ICD-10-CM

## 2022-12-29 RX ORDER — ESOMEPRAZOLE MAGNESIUM 40 MG/1
CAPSULE, DELAYED RELEASE ORAL
Qty: 180 CAPSULE | Refills: 0 | Status: SHIPPED | OUTPATIENT
Start: 2022-12-29

## 2022-12-29 RX ORDER — METOPROLOL TARTRATE 50 MG/1
TABLET ORAL
Qty: 180 TABLET | Refills: 1 | Status: SHIPPED | OUTPATIENT
Start: 2022-12-29

## 2023-01-04 NOTE — TELEPHONE ENCOUNTER
Colonoscopy in [facility]    Close Enc    Thanks  Vikki PASCUAL   Referral Specialist  [facility]

## 2023-02-15 ENCOUNTER — OFFICE VISIT (OUTPATIENT)
Dept: FAMILY MEDICINE CLINIC | Age: 68
End: 2023-02-15
Payer: MEDICARE

## 2023-02-15 DIAGNOSIS — R68.89 COLD INTOLERANCE: ICD-10-CM

## 2023-02-15 DIAGNOSIS — Z71.89 ACP (ADVANCE CARE PLANNING): ICD-10-CM

## 2023-02-15 DIAGNOSIS — I10 ESSENTIAL HYPERTENSION: Primary | ICD-10-CM

## 2023-02-15 DIAGNOSIS — D05.11 DUCTAL CARCINOMA IN SITU (DCIS) OF RIGHT BREAST: ICD-10-CM

## 2023-02-15 DIAGNOSIS — Z86.69 HISTORY OF MIGRAINE HEADACHES: ICD-10-CM

## 2023-02-15 DIAGNOSIS — K21.00 GASTROESOPHAGEAL REFLUX DISEASE WITH ESOPHAGITIS WITHOUT HEMORRHAGE: ICD-10-CM

## 2023-02-15 DIAGNOSIS — E55.9 VITAMIN D DEFICIENCY: ICD-10-CM

## 2023-02-15 DIAGNOSIS — M48.07 SPINAL STENOSIS OF LUMBOSACRAL REGION: ICD-10-CM

## 2023-02-15 DIAGNOSIS — Z00.00 MEDICARE ANNUAL WELLNESS VISIT, SUBSEQUENT: ICD-10-CM

## 2023-02-15 DIAGNOSIS — R53.83 FATIGUE, UNSPECIFIED TYPE: ICD-10-CM

## 2023-02-15 DIAGNOSIS — M79.7 FIBROMYALGIA: ICD-10-CM

## 2023-02-15 DIAGNOSIS — F11.99 OPIOID USE, UNSPECIFIED WITH UNSPECIFIED OPIOID-INDUCED DISORDER (HCC): ICD-10-CM

## 2023-02-15 PROCEDURE — G0463 HOSPITAL OUTPT CLINIC VISIT: HCPCS | Performed by: FAMILY MEDICINE

## 2023-02-15 NOTE — PROGRESS NOTES
Chief Complaint   Patient presents with    Hypertension    Cold     Can't get warm    1. \"Have you been to the ER, urgent care clinic since your last visit? Hospitalized since your last visit? \" No    2. \"Have you seen or consulted any other health care providers outside of the [address] [place] [address] since your last visit? \" Yes [facility] Dr Nikkie Seo,       3. For patients over 45: Has the patient had a colonoscopy? Yes - no Care Gap present     If the patient is female:    4. For patients over 40: Has the patient had a mammogram? Yes - no Care Gap present    5. For patients over 21: Has the patient had a pap smear?  Yes - no Care Gap present

## 2023-02-15 NOTE — PROGRESS NOTES
HISTORY OF PRESENT ILLNESS  HPI  Alen Palomino is a 78 y.o. female with a history of migraine, GERD, colon polyp, osteopenia and osteoarthritis of spine, lumbar spondylosis, appendectomy, DCIS of breast and fibromyalgia, who presents to the office today c/o feeling cold and for f/u of these health problems    Pt reports she she feels cold all over. She has had this before about 10 years ago, but it was intermittent lasting for a few hours or so coming on once or twice a day for a few weeks to months. This problem had decreased in frequency and longevity for a time, only having it here and there. Now, she has this all day, every day for the past 4-5 months. Pt had read that thyroid or circulation could contribute. Pt has no personal hx of thyroid problems, but her father and paternal grandmother do. Her father developed pituitary atrophy in his early 63's and her paternal grandmother's sister also had thyroid problems. Pt mentions she is is doing well with Ubrelvy. Pt estimates she has a migraine 3-4 times a month. She has noticed her finger nails and toe nails curl, peel, crack, and break off. This has been a problem for the past 2-3 years. Pt denies unusual SOB, chest pain, and any recent ER visits or hospitalizations.        Past Medical History:   Diagnosis Date    Arthritis 12/20/2010    Cancer [facility]) 2008    right breast cancer    Colon polyp     DCIS (ductal carcinoma in situ) of breast 12/20/2010    right/surgery and radiation    Fibromyalgia 12/20/2010    GERD (gastroesophageal reflux disease) 12/20/2010    History of migraine headaches 07/20/2017    Migraine 12/20/2010    Nausea & vomiting      Past Surgical History:   Procedure Laterality Date    COLONOSCOPY N/A 8/22/2016    COLONOSCOPY performed by Jami Burch MD at [facility] ENDOSCOPY    COLONOSCOPY N/A 11/5/2019    COLONOSCOPY performed by Allen Iqbal MD at [facility] ENDOSCOPY    COLONOSCOPY N/A 12/19/2022    COLONOSCOPY performed by Sarah Joy Zunilda Samuels MD at [address]       x2    HX APPENDECTOMY      HX MASTECTOMY  2008    right,then reconstruction/implant     Current Outpatient Medications on File Prior to Visit   Medication Sig Dispense Refill    esomeprazole (NEXIUM) 40 mg capsule TAKE 1 CAPSULE BY MOUTH TWICE DAILY 180 Capsule 0    metoprolol tartrate (LOPRESSOR) 50 mg tablet TAKE 1 TABLET BY MOUTH TWICE DAILY 180 Tablet 1    traMADoL (ULTRAM) 50 mg tablet Take 50 mg by mouth daily as needed for Pain. cyclosporine (RESTASIS OP) Administer 1 Drop to both eyes two (2) times a day. cyanocobalamin (VITAMIN B12) 2,500 mcg sublingual tablet Take 2,500 mcg by mouth daily. celecoxib (CELEBREX) 200 mg capsule TAKE 1 CAPSULE BY MOUTH TWICE DAILY 180 Capsule 1    DULoxetine (CYMBALTA) 60 mg capsule Take 1 Capsule by mouth in the morning. 90 Capsule 3    ubrogepant (Ubrelvy) 50 mg tablet Take 1 Tablet by mouth as needed for Migraine (Take at onset of migraine. Max dose 50mg/d.). 10 Tablet 5    cholecalciferol (VITAMIN D3) (5000 Units /125 mcg) capsule Take 1 Capsule by mouth daily. No current facility-administered medications on file prior to visit. Allergies   Allergen Reactions    Aspirin Nausea and Vomiting    Betadine [Povidone-Iodine] Rash    Nsaids (Non-Steroidal Anti-Inflammatory Drug) Nausea and Vomiting    Other Medication Nausea and Vomiting     General anes. ..      Family History   Problem Relation Age of Onset    Coronary Art Dis Mother     Hypertension Mother     Heart Disease Mother     Diabetes Mother     Pulmonary Embolism Mother     Coronary Art Dis Father     Heart Disease Father     Hypertension Father     Kidney Disease Father     Thyroid Disease Father     Stroke Maternal Grandmother 76    Diabetes Maternal Grandfather 80    COPD Maternal Grandfather     Hypertension Paternal Grandmother 80    Coronary Art Dis Paternal Grandfather 80    Hypertension Paternal Grandfather      Social History Socioeconomic History    Marital status:    Tobacco Use    Smoking status: Never    Smokeless tobacco: Never   Vaping Use    Vaping Use: Never used   Substance and Sexual Activity    Alcohol use: No    Drug use: No    Sexual activity: Not Currently     Social Determinants of Health     Financial Resource Strain: Low Risk     Difficulty of Paying Living Expenses: Not hard at all   Food Insecurity: No Food Insecurity    Worried About Running Out of Food in the Last Year: Never true    Ran Out of Food in the Last Year: Never true                 Review of Systems   Constitutional:  Negative for chills, diaphoresis, fever, malaise/fatigue and weight loss. Eyes:  Negative for blurred vision, double vision, pain and redness. Respiratory:  Negative for cough, shortness of breath and wheezing. Cardiovascular:  Negative for chest pain, palpitations, orthopnea, claudication, leg swelling and PND. Skin:  Negative for itching and rash. Neurological:  Negative for dizziness, tingling, tremors, sensory change, speech change, focal weakness, seizures, loss of consciousness, weakness and headaches.    Results for orders placed or performed in visit on 02/15/23   TSH 3RD GENERATION   Result Value Ref Range    TSH 1.31 0.36 - 3.74 uIU/mL   CBC W/O DIFF   Result Value Ref Range    WBC 7.8 3.6 - 11.0 K/uL    RBC 4.51 3.80 - 5.20 M/uL    HGB 13.7 11.5 - 16.0 g/dL    HCT 43.2 35.0 - 47.0 %    MCV 95.8 80.0 - 99.0 FL    MCH 30.4 26.0 - 34.0 PG    MCHC 31.7 30.0 - 36.5 g/dL    RDW 13.5 11.5 - 14.5 %    PLATELET 852 184 - 300 K/uL    MPV 9.9 8.9 - 12.9 FL    NRBC 0.0 0  WBC    ABSOLUTE NRBC 0.00 0.00 - 2.46 K/uL   METABOLIC PANEL, COMPREHENSIVE   Result Value Ref Range    Sodium 143 136 - 145 mmol/L    Potassium 4.6 3.5 - 5.1 mmol/L    Chloride 108 97 - 108 mmol/L    CO2 30 21 - 32 mmol/L    Anion gap 5 5 - 15 mmol/L    Glucose 94 65 - 100 mg/dL    BUN 17 6 - 20 MG/DL    Creatinine 0.72 0.55 - 1.02 MG/DL BUN/Creatinine ratio 24 (H) 12 - 20      eGFR >60 >60 ml/min/1.73m2    Calcium 9.8 8.5 - 10.1 MG/DL    Bilirubin, total 0.4 0.2 - 1.0 MG/DL    ALT (SGPT) 44 12 - 78 U/L    AST (SGOT) 28 15 - 37 U/L    Alk. phosphatase 159 (H) 45 - 117 U/L    Protein, total 7.1 6.4 - 8.2 g/dL    Albumin 4.0 3.5 - 5.0 g/dL    Globulin 3.1 2.0 - 4.0 g/dL    A-G Ratio 1.3 1.1 - 2.2     LIPID PANEL   Result Value Ref Range    Cholesterol, total 214 (H) <200 MG/DL    Triglyceride 148 <150 MG/DL    HDL Cholesterol 66 MG/DL    LDL, calculated 118.4 (H) 0 - 100 MG/DL    VLDL, calculated 29.6 MG/DL    CHOL/HDL Ratio 3.2 0.0 - 5.0             Physical Exam  Visit Vitals  /72 (BP 1 Location: Left upper arm, BP Patient Position: Sitting, BP Cuff Size: Adult)   Pulse 67   Temp 98.1 °F (36.7 °C) (Temporal)   Resp 16   Ht 5' 2\" (1.575 m)   Wt 150 lb (68 kg)   SpO2 98%   BMI 27.44 kg/m²       Head: Normocephalic, without obvious abnormality, atraumatic  Eyes: conjunctivae/corneas clear. PERRL, EOM's intact. Neck: supple, symmetrical, trachea midline, no adenopathy, thyroid: not enlarged, symmetric, no tenderness/mass/nodules, no carotid bruit and no JVD  Lungs: clear to auscultation bilaterally  Heart: regular rate and rhythm, S1, S2 normal, no murmur, click, rub or gallop  Extremities: extremities normal, atraumatic, no cyanosis or edema  Skin: all of her finger nails have linear ridges that are vertical in the nail. There is no pitting horizontal ridges. A few of the nails at the end are scooped to different amounts, but the nail itself and the nail bed appears to be normal  Pulses: 2+ and symmetric  Lymph nodes: Cervical, supraclavicular, and axillary nodes normal.  Neurologic: Grossly normal        ASSESSMENT and PLAN    ICD-10-CM ICD-9-CM    1. Essential hypertension  I10 401.9 LIPID PANEL      METABOLIC PANEL, COMPREHENSIVE      METABOLIC PANEL, COMPREHENSIVE      LIPID PANEL      2.  Cold intolerance  R68.89 780.99 CBC W/O DIFF TSH 3RD GENERATION      TSH 3RD GENERATION      CBC W/O DIFF      3. Fatigue, unspecified type  R53.83 780.79 CBC W/O DIFF      TSH 3RD GENERATION      TSH 3RD GENERATION      CBC W/O DIFF      4. Gastroesophageal reflux disease with esophagitis without hemorrhage  K21.00 530.81      530.10       5. Fibromyalgia  M79.7 729.1       6. Spinal stenosis of lumbosacral region  M48.07 724.02       7. Ductal carcinoma in situ (DCIS) of right breast  D05.11 233.0       8. History of migraine headaches  Z86.69 V12.49       9. Vitamin D deficiency  E55.9 268.9       10. Opioid use, unspecified with unspecified opioid-induced disorder  F11.99 292.9      305.50       11. ACP (advance care planning)  Z71.89 V65.49       12. Medicare annual wellness visit, subsequent  Z00.00 V70.0         Diagnoses and all orders for this visit:    1. Essential hypertension  -     LIPID PANEL; Future  -     METABOLIC PANEL, COMPREHENSIVE; Future    2. Cold intolerance  -     CBC W/O DIFF; Future  -     TSH 3RD GENERATION; Future    3. Fatigue, unspecified type  -     CBC W/O DIFF; Future  -     TSH 3RD GENERATION; Future    4. Gastroesophageal reflux disease with esophagitis without hemorrhage    5. Fibromyalgia    6. Spinal stenosis of lumbosacral region    7. Ductal carcinoma in situ (DCIS) of right breast    8. History of migraine headaches    9. Vitamin D deficiency    10. Opioid use, unspecified with unspecified opioid-induced disorder    11. ACP (advance care planning)    12. Medicare annual wellness visit, subsequent    Follow-up and Dispositions    Return in about 6 months (around 8/15/2023) for GERD, osteoarthritis, f/u Vitamin D deficiency. lab results and schedule of future lab studies reviewed with patient  reviewed diet, exercise and weight control  cardiovascular risk and specific lipid/LDL goals reviewed  reviewed medications and side effects in detail  Please call my office if there are any questions- [phone].   I will arrange for follow up after the lab tests done from today return  Recommended a weekly \"heart check. \" I went into detail how to do this. Call for refills on medications as needed. Discussed expected course/resolution/complications of diagnosis in detail with patient. Medication risks/benefits/costs/interactions/alternatives discussed with patient. Pt was given an after visit summary which includes diagnoses, current medications & vitals. Pt expressed understanding with the diagnosis and plan    BMI is significantly elevated- in the overweight range. I reviewed diet, exercise and weight control. Discussed weight control in detail, the importance of mainly decreased carbs, and for weight maintenance, exercise; discussed different diets and that it isn't as important to watch the type of foods as it is to decrease calorie intake no matter what type of diet you do, etc. Mindful eating also discussed- Naturally Slim( now W[employer]) or Noom are 2 options  Total 30 minutes( in addition to the time doing the [place] Visit)    re: Review of  the proper technique of checking the blood pressure- check it on an average day only, not on a stressful day, sitting, no exercise for at least 1 hour and not experiencing any new pain( chronic pain is OK). Patient encouraged to check BP sitting and standing at least once a month and to report these readings to me if > 140/ 90 on average , or if the standing BP is >  15 points lower than the sitting. Always check it twice and if there is > 5 points decrease from the previous reading( top reading or systolic) keep checking it until it does not drop 5 points. Write only this final reading down, not the preceding readings. If out of these readings there is only 1 out of 4 or less > 152, or > 90 diastolic then your blood pressure is OK; it needs further treatment if it is above this.     Also, don't forget,  as noted above, to check your blood pressure standing once a month; this is to detect a drop in your BP that might lead to fainting and serious injury; you check it standing with your arm hanging straight down and relaxed. Check it twice waiting 1 minute between the two readings. If, with either one of these 2 readings there is a > 15 point drop of the systolic compared to your sitting pressure( done before the standing BP), then let me know. Following these guidelines, continue to check your BP and write down only the ones described above and it will help me to effectively treat your blood pressure. Reviewed symptoms, or lack thereof, of hypertension and elevated cholesterol. Regular exercise is very important to your health; it helps mentally, physically, socially; it prevents injuries if done properly. Exercise, even as simple as walking 20-30 minutes daily has major benefits to your health even though your \"numbers\" are the same in the lab. See if you can add this into your daily regimen and after a few months it will become a regular habit-\"just something you do,\" like brushing your teeth. A combination of aerobic exercise and strengthening and stretching is felt to be the best for you, so this should be your ultimate goal.   This can be done in the privacy of your home or in a group setting as at the gym  Some prefer having a , others prefer to do exercise in groups or individually. Do what \"works\" for you. You need to make it simple and \"fun,\" or you most likely will not continue it. Recommended a weekly \"heart check. \" I went into detail how to do this. Also, discussed symptoms of concern that were noted today in the note above, treatment options( including doing nothing), when to follow up before recommended time frame. Also, answered all questions. I told pt that because of her feeling cold, we will check for thyroid and anemia in addition to the normal things we check for her. She should continue to check her BP on a regular basis.  We talked briefly about thyroid problems and that they usually come from deficient thyroid hormone, but the cause of that could be the thyroid gland itself or the pituitary. The TSH will help distinguish between the two.   This document was written by Juana Reyes, as dictated by Jesica Cardenas MD.

## 2023-02-16 LAB
ALBUMIN SERPL-MCNC: 4 G/DL (ref 3.5–5)
ALBUMIN/GLOB SERPL: 1.3 (ref 1.1–2.2)
ALP SERPL-CCNC: 159 U/L (ref 45–117)
ALT SERPL-CCNC: 44 U/L (ref 12–78)
ANION GAP SERPL CALC-SCNC: 5 MMOL/L (ref 5–15)
AST SERPL-CCNC: 28 U/L (ref 15–37)
BILIRUB SERPL-MCNC: 0.4 MG/DL (ref 0.2–1)
BUN SERPL-MCNC: 17 MG/DL (ref 6–20)
BUN/CREAT SERPL: 24 (ref 12–20)
CALCIUM SERPL-MCNC: 9.8 MG/DL (ref 8.5–10.1)
CHLORIDE SERPL-SCNC: 108 MMOL/L (ref 97–108)
CHOLEST SERPL-MCNC: 214 MG/DL
CO2 SERPL-SCNC: 30 MMOL/L (ref 21–32)
CREAT SERPL-MCNC: 0.72 MG/DL (ref 0.55–1.02)
ERYTHROCYTE [DISTWIDTH] IN BLOOD BY AUTOMATED COUNT: 13.5 % (ref 11.5–14.5)
GLOBULIN SER CALC-MCNC: 3.1 G/DL (ref 2–4)
GLUCOSE SERPL-MCNC: 94 MG/DL (ref 65–100)
HCT VFR BLD AUTO: 43.2 % (ref 35–47)
HDLC SERPL-MCNC: 66 MG/DL
HDLC SERPL: 3.2 (ref 0–5)
HGB BLD-MCNC: 13.7 G/DL (ref 11.5–16)
LDLC SERPL CALC-MCNC: 118.4 MG/DL (ref 0–100)
MCH RBC QN AUTO: 30.4 PG (ref 26–34)
MCHC RBC AUTO-ENTMCNC: 31.7 G/DL (ref 30–36.5)
MCV RBC AUTO: 95.8 FL (ref 80–99)
NRBC # BLD: 0 K/UL (ref 0–0.01)
NRBC BLD-RTO: 0 PER 100 WBC
PLATELET # BLD AUTO: 310 K/UL (ref 150–400)
PMV BLD AUTO: 9.9 FL (ref 8.9–12.9)
POTASSIUM SERPL-SCNC: 4.6 MMOL/L (ref 3.5–5.1)
PROT SERPL-MCNC: 7.1 G/DL (ref 6.4–8.2)
RBC # BLD AUTO: 4.51 M/UL (ref 3.8–5.2)
SODIUM SERPL-SCNC: 143 MMOL/L (ref 136–145)
TRIGL SERPL-MCNC: 148 MG/DL (ref ?–150)
TSH SERPL DL<=0.05 MIU/L-ACNC: 1.31 UIU/ML (ref 0.36–3.74)
VLDLC SERPL CALC-MCNC: 29.6 MG/DL
WBC # BLD AUTO: 7.8 K/UL (ref 3.6–11)

## 2023-02-18 VITALS
RESPIRATION RATE: 16 BRPM | HEIGHT: 62 IN | DIASTOLIC BLOOD PRESSURE: 72 MMHG | TEMPERATURE: 98.1 F | OXYGEN SATURATION: 98 % | HEART RATE: 67 BPM | BODY MASS INDEX: 27.6 KG/M2 | SYSTOLIC BLOOD PRESSURE: 135 MMHG | WEIGHT: 150 LBS

## 2023-02-18 NOTE — PATIENT INSTRUCTIONS
Medicare Wellness Visit, Female     The best way to live healthy is to have a lifestyle where you eat a well-balanced diet, exercise regularly, limit alcohol use, and quit all forms of tobacco/nicotine, if applicable. Regular preventive services are another way to keep healthy. Preventive services (vaccines, screening tests, monitoring & exams) can help personalize your care plan, which helps you manage your own care. Screening tests can find health problems at the earliest stages, when they are easiest to treat. [facility] follows the current, evidence-based guidelines published by the [facility] Renaldo Bender ([facility]) when recommending preventive services for our patients. Because we follow these guidelines, sometimes recommendations change over time as research supports it. (For example, mammograms used to be recommended annually. Even though Medicare will still pay for an annual mammogram, the newer guidelines recommend a mammogram every two years for women of average risk). Of course, you and your doctor may decide to screen more often for some diseases, based on your risk and your co-morbidities (chronic disease you are already diagnosed with). Preventive services for you include:  - Medicare offers their members a free annual wellness visit, which is time for you and your primary care provider to discuss and plan for your preventive service needs.  Take advantage of this benefit every year!    -Over the age of 72 should receive the recommended pneumonia vaccines.    -All adults should have a flu vaccine yearly.  -All adults should have a tetanus vaccine every 10 years.   -Over the age 48 should receive the shingles vaccines.        -All adults should be screened once for Hepatitis C.  -All adults age 38-68 who are overweight should have a diabetes screening test once every three years.   -Other screening tests and preventive services for persons with diabetes include: an eye exam to screen for diabetic retinopathy, a kidney function test, a foot exam, and stricter control over your cholesterol.   -Cardiovascular screening for adults with routine risk involves an electrocardiogram (ECG) at intervals determined by your doctor.     -Colorectal cancer screenings should be done for adults age 39-70 with no increased risk factors for colorectal cancer. There are a number of acceptable methods of screening for this type of cancer. Each test has its own benefits and drawbacks. Discuss with your doctor what is most appropriate for you during your annual wellness visit. The different tests include: colonoscopy (considered the best screening method), a fecal occult blood test, a fecal DNA test, and sigmoidoscopy.    -Lung cancer screening is recommended annually with a low dose CT scan for adults between age 54 and 68, who have smoked at least 30 pack years (equivalent of 1 pack per day for 30 days), and who is a current smoker or quit less than 15 years ago.    -A bone mass density test is recommended when a woman turns 65 to screen for osteoporosis. This test is only recommended one time, as a screening. Some providers will use this same test as a disease monitoring tool if you already have osteoporosis. -Breast cancer screenings are recommended every other year for women of normal risk, age 54-69.    -Cervical cancer screenings for women over age 72 are only recommended with certain risk factors.      Here is a list of your current Health Maintenance items (your personalized list of preventive services) with a due date:  Health Maintenance Due   Topic Date Due    DTaP/Tdap/Td  (1 - Tdap) Never done    Bone Mineral Density   09/23/2021    Pneumococcal Vaccine (1 - PCV) Never done    COVID-19 Vaccine (4 - Booster for Moderna series) 02/04/2022

## 2023-02-18 NOTE — PROGRESS NOTES
This is the Subsequent Medicare Annual Wellness Exam, performed 12 months or more after the Initial AWV or the last Subsequent AWV    I have reviewed the patient's medical history in detail and updated the computerized patient record. Assessment/Plan   Education and counseling provided:  Are appropriate based on today's review and evaluation    1. Essential hypertension  -     LIPID PANEL; Future  -     METABOLIC PANEL, COMPREHENSIVE; Future  2. Cold intolerance  -     CBC W/O DIFF; Future  -     TSH 3RD GENERATION; Future  3. Fatigue, unspecified type  -     CBC W/O DIFF; Future  -     TSH 3RD GENERATION; Future  4. Gastroesophageal reflux disease with esophagitis without hemorrhage  5. Fibromyalgia  6. Spinal stenosis of lumbosacral region  7. Ductal carcinoma in situ (DCIS) of right breast  8. History of migraine headaches  9. Vitamin D deficiency  10. Opioid use, unspecified with unspecified opioid-induced disorder  11. ACP (advance care planning)  12.  Medicare annual wellness visit, subsequent       Depression Risk Factor Screening     3 most recent PHQ Screens 2/15/2023   Little interest or pleasure in doing things Not at all   Feeling down, depressed, irritable, or hopeless Not at all   Total Score PHQ 2 0   Trouble falling or staying asleep, or sleeping too much -   Feeling tired or having little energy -   Poor appetite, weight loss, or overeating -   Feeling bad about yourself - or that you are a failure or have let yourself or your family down -   Trouble concentrating on things such as school, work, reading, or watching TV -   Moving or speaking so slowly that other people could have noticed; or the opposite being so fidgety that others notice -   Thoughts of being better off dead, or hurting yourself in some way -   PHQ 9 Score -   How difficult have these problems made it for you to do your work, take care of your home and get along with others -       Alcohol & Drug Abuse Risk Screen    Do you average more than 1 drink per night or more than 7 drinks a week:  No    On any one occasion in the past three months have you have had more than 3 drinks containing alcohol:  No       Opioid Risk: (Low risk score <55, High risk score ?55)  Opioid risk score: 7      Click here to complete the Controlled Substance Monitoring SmartForm    Last PDMP Amalia as Reviewed:  Review User Review Instant Review Result   AMALIA CHRISTENSEN 11/1/2022  3:12 AM Reviewed PDMP [1]     Functional Ability and Level of Safety    Hearing: Hearing is good. Activities of Daily Living: The home contains: no safety equipment. Patient does total self care      Ambulation: with no difficulty     Fall Risk:  Fall Risk Assessment, last 12 mths 2/15/2023   Able to walk? Yes   Fall in past 12 months? 0   Do you feel unsteady? 0   Are you worried about falling 0      Abuse Screen:  Patient is not abused       Cognitive Screening    Has your family/caregiver stated any concerns about your memory: no     Cognitive Screening: Normal - Mini Cog Test  I reviewed advanced directive information and written information given to patient; patient to make follow up appointment with a NN for any questions,to review choices made for health care, agent, and anatomical gifts that are on the advanced directive at home.      Health Maintenance Due     Health Maintenance Due   Topic Date Due    DTaP/Tdap/Td series (1 - Tdap) Never done    Bone Densitometry (Dexa) Screening  09/23/2021    Pneumococcal 65+ years (1 - PCV) Never done    COVID-19 Vaccine (4 - Booster for Moderna series) 02/04/2022       Patient Care Team   Patient Care Team:  Gino Stringer MD as PCP - Elmer Suero MD as PCP - [facility] Empaneled Provider    History     Patient Active Problem List   Diagnosis Code    DCIS (ductal carcinoma in situ) of breast-June 2008- mastectomy, then reconstruction D05.10    Migraine G43.909    GERD (gastroesophageal reflux disease) K21.9    Fibromyalgia M79.7 Colonic polyp-  lg @ hepatic flexure-2015 K63.5    History of migraine headaches Z86.69    History of migraine Z86.69    Osteopenia of lumbar spine M85.88    Osteoarthritis of spine M47.9    Spondylosis of lumbar region without myelopathy or radiculopathy M47.816    Pedal edema R60.0    KYLE (dyspnea on exertion) R06.09    Arthritis M19.90    Vitamin D deficiency E55.9    Essential hypertension I10     Past Medical History:   Diagnosis Date    Arthritis 2010    Cancer [facility]) 2008    right breast cancer    Colon polyp     DCIS (ductal carcinoma in situ) of breast 2010    right/surgery and radiation    Fibromyalgia 2010    GERD (gastroesophageal reflux disease) 2010    History of migraine headaches 2017    Migraine 2010    Nausea & vomiting       Past Surgical History:   Procedure Laterality Date    COLONOSCOPY N/A 2016    COLONOSCOPY performed by Lukas Saavedra MD at [facility] ENDOSCOPY    COLONOSCOPY N/A 2019    COLONOSCOPY performed by Ranjana Nino MD at [facility] ENDOSCOPY    COLONOSCOPY N/A 2022    COLONOSCOPY performed by Leonarda Fern[facility]dez [facility] at [facility]       x2    HX APPENDECTOMY      HX MASTECTOMY  2008    right,then reconstruction/implant     Current Outpatient Medications   Medication Sig Dispense Refill    esomeprazole (NEXIUM) 40 mg capsule TAKE 1 CAPSULE BY MOUTH TWICE DAILY 180 Capsule 0    metoprolol tartrate (LOPRESSOR) 50 mg tablet TAKE 1 TABLET BY MOUTH TWICE DAILY 180 Tablet 1    traMADoL (ULTRAM) 50 mg tablet Take 50 mg by mouth daily as needed for Pain. cyclosporine (RESTASIS OP) Administer 1 Drop to both eyes two (2) times a day. cyanocobalamin (VITAMIN B12) 2,500 mcg sublingual tablet Take 2,500 mcg by mouth daily. celecoxib (CELEBREX) 200 mg capsule TAKE 1 CAPSULE BY MOUTH TWICE DAILY 180 Capsule 1    DULoxetine (CYMBALTA) 60 mg capsule Take 1 Capsule by mouth in the morning.  90 Capsule 3 ubrogepant Nonnie Dolphin) 50 mg tablet Take 1 Tablet by mouth as needed for Migraine (Take at onset of migraine. Max dose 50mg/d.). 10 Tablet 5    cholecalciferol (VITAMIN D3) (5000 Units /125 mcg) capsule Take 1 Capsule by mouth daily. Allergies   Allergen Reactions    Aspirin Nausea and Vomiting    Betadine [Povidone-Iodine] Rash    Nsaids (Non-Steroidal Anti-Inflammatory Drug) Nausea and Vomiting    Other Medication Nausea and Vomiting     General anes. ..        Family History   Problem Relation Age of Onset    Coronary Art Dis Mother     Hypertension Mother     Heart Disease Mother     Diabetes Mother     Pulmonary Embolism Mother     Coronary Art Dis Father     Heart Disease Father     Hypertension Father     Kidney Disease Father     Thyroid Disease Father     Stroke Maternal Grandmother 76    Diabetes Maternal Grandfather 80    COPD Maternal Grandfather     Hypertension Paternal Grandmother 80    Coronary Art Dis Paternal Grandfather 80    Hypertension Paternal Grandfather      Social History     Tobacco Use    Smoking status: Never    Smokeless tobacco: Never   Substance Use Topics    Alcohol use: No         Dioni Stevens MD

## 2023-03-13 ENCOUNTER — TELEPHONE (OUTPATIENT)
Dept: NEUROLOGY | Age: 68
End: 2023-03-13

## 2023-03-13 NOTE — TELEPHONE ENCOUNTER
Patient stopped by the . She dropped off a \"application for [employer]" form for her Jenny Marquez.

## 2023-05-01 DIAGNOSIS — M19.90 ARTHRITIS: ICD-10-CM

## 2023-05-01 DIAGNOSIS — M48.07 SPINAL STENOSIS OF LUMBOSACRAL REGION: Primary | ICD-10-CM

## 2023-05-02 RX ORDER — TRAMADOL HYDROCHLORIDE 50 MG/1
TABLET ORAL
Qty: 90 TABLET | Refills: 1 | Status: SHIPPED | OUTPATIENT
Start: 2023-05-02 | End: 2023-06-01

## 2023-06-28 RX ORDER — METOPROLOL TARTRATE 50 MG/1
TABLET, FILM COATED ORAL
Qty: 180 TABLET | Refills: 0 | Status: SHIPPED | OUTPATIENT
Start: 2023-06-28 | End: 2023-08-15 | Stop reason: SDUPTHER

## 2023-07-17 ENCOUNTER — TELEPHONE (OUTPATIENT)
Age: 68
End: 2023-07-17

## 2023-07-17 NOTE — TELEPHONE ENCOUNTER
Received message from patient stating, \"Good afternoon,  I am requesting assistance for the medication Ubrelvy. The initial application was submitted in April. 2023. I spoke with Gucci Delgado concerning  approval for Ubrelvy last week. They stated they are still waiting on the prior authorization from the providers office. Please fax to [phone]. Thanks  Jayson . \"

## 2023-07-26 ENCOUNTER — TELEPHONE (OUTPATIENT)
Age: 68
End: 2023-07-26

## 2023-07-26 RX ORDER — DULOXETIN HYDROCHLORIDE 60 MG/1
60 CAPSULE, DELAYED RELEASE ORAL DAILY
Qty: 90 CAPSULE | Refills: 1 | Status: SHIPPED | OUTPATIENT
Start: 2023-07-26

## 2023-08-14 NOTE — TELEPHONE ENCOUNTER
Re: Janene Saldana fax copy of PA form from nurse, completed questions but not in office tofax out, will fax out tomorrow with notes.

## 2023-08-15 ENCOUNTER — OFFICE VISIT (OUTPATIENT)
Age: 68
End: 2023-08-15
Payer: MEDICARE

## 2023-08-15 VITALS
BODY MASS INDEX: 28.16 KG/M2 | WEIGHT: 153 LBS | HEART RATE: 85 BPM | OXYGEN SATURATION: 97 % | TEMPERATURE: 97 F | DIASTOLIC BLOOD PRESSURE: 70 MMHG | SYSTOLIC BLOOD PRESSURE: 122 MMHG | HEIGHT: 62 IN | RESPIRATION RATE: 16 BRPM

## 2023-08-15 DIAGNOSIS — E78.5 DYSLIPIDEMIA, GOAL LDL BELOW 130: ICD-10-CM

## 2023-08-15 DIAGNOSIS — M48.07 SPINAL STENOSIS, LUMBOSACRAL REGION: ICD-10-CM

## 2023-08-15 DIAGNOSIS — K21.00 GASTRO-ESOPHAGEAL REFLUX DISEASE WITH ESOPHAGITIS, WITHOUT BLEEDING: ICD-10-CM

## 2023-08-15 DIAGNOSIS — M79.7 FIBROMYALGIA: ICD-10-CM

## 2023-08-15 DIAGNOSIS — K21.01 GASTROESOPHAGEAL REFLUX DISEASE WITH ESOPHAGITIS AND HEMORRHAGE: ICD-10-CM

## 2023-08-15 DIAGNOSIS — E55.9 VITAMIN D DEFICIENCY: ICD-10-CM

## 2023-08-15 DIAGNOSIS — R61 SWEATING INCREASE: ICD-10-CM

## 2023-08-15 DIAGNOSIS — I10 ESSENTIAL (PRIMARY) HYPERTENSION: Primary | ICD-10-CM

## 2023-08-15 DIAGNOSIS — M47.816 SPONDYLOSIS OF LUMBAR REGION WITHOUT MYELOPATHY OR RADICULOPATHY: ICD-10-CM

## 2023-08-15 DIAGNOSIS — E53.8 VITAMIN B 12 DEFICIENCY: ICD-10-CM

## 2023-08-15 PROCEDURE — 3017F COLORECTAL CA SCREEN DOC REV: CPT | Performed by: FAMILY MEDICINE

## 2023-08-15 PROCEDURE — 99215 OFFICE O/P EST HI 40 MIN: CPT | Performed by: FAMILY MEDICINE

## 2023-08-15 PROCEDURE — G8427 DOCREV CUR MEDS BY ELIG CLIN: HCPCS | Performed by: FAMILY MEDICINE

## 2023-08-15 PROCEDURE — 3074F SYST BP LT 130 MM HG: CPT | Performed by: FAMILY MEDICINE

## 2023-08-15 PROCEDURE — G8419 CALC BMI OUT NRM PARAM NOF/U: HCPCS | Performed by: FAMILY MEDICINE

## 2023-08-15 PROCEDURE — 3078F DIAST BP <80 MM HG: CPT | Performed by: FAMILY MEDICINE

## 2023-08-15 PROCEDURE — 1090F PRES/ABSN URINE INCON ASSESS: CPT | Performed by: FAMILY MEDICINE

## 2023-08-15 PROCEDURE — 1123F ACP DISCUSS/DSCN MKR DOCD: CPT | Performed by: FAMILY MEDICINE

## 2023-08-15 PROCEDURE — 1036F TOBACCO NON-USER: CPT | Performed by: FAMILY MEDICINE

## 2023-08-15 PROCEDURE — G8400 PT W/DXA NO RESULTS DOC: HCPCS | Performed by: FAMILY MEDICINE

## 2023-08-15 RX ORDER — ESOMEPRAZOLE MAGNESIUM 40 MG/1
40 CAPSULE, DELAYED RELEASE ORAL 2 TIMES DAILY
Qty: 180 CAPSULE | Refills: 1 | Status: SHIPPED | OUTPATIENT
Start: 2023-08-15

## 2023-08-15 RX ORDER — METOPROLOL TARTRATE 50 MG/1
50 TABLET, FILM COATED ORAL 2 TIMES DAILY
Qty: 180 TABLET | Refills: 1 | Status: SHIPPED | OUTPATIENT
Start: 2023-08-15

## 2023-08-15 SDOH — ECONOMIC STABILITY: FOOD INSECURITY: WITHIN THE PAST 12 MONTHS, YOU WORRIED THAT YOUR FOOD WOULD RUN OUT BEFORE YOU GOT MONEY TO BUY MORE.: NEVER TRUE

## 2023-08-15 SDOH — ECONOMIC STABILITY: INCOME INSECURITY: HOW HARD IS IT FOR YOU TO PAY FOR THE VERY BASICS LIKE FOOD, HOUSING, MEDICAL CARE, AND HEATING?: NOT HARD AT ALL

## 2023-08-15 SDOH — ECONOMIC STABILITY: HOUSING INSECURITY
IN THE LAST 12 MONTHS, WAS THERE A TIME WHEN YOU DID NOT HAVE A STEADY PLACE TO SLEEP OR SLEPT IN A SHELTER (INCLUDING NOW)?: NO

## 2023-08-15 SDOH — ECONOMIC STABILITY: FOOD INSECURITY: WITHIN THE PAST 12 MONTHS, THE FOOD YOU BOUGHT JUST DIDN'T LAST AND YOU DIDN'T HAVE MONEY TO GET MORE.: NEVER TRUE

## 2023-08-15 ASSESSMENT — ENCOUNTER SYMPTOMS
SHORTNESS OF BREATH: 0
EYE PAIN: 0
WHEEZING: 0
EYE REDNESS: 0
COUGH: 0

## 2023-08-15 ASSESSMENT — PATIENT HEALTH QUESTIONNAIRE - PHQ9
SUM OF ALL RESPONSES TO PHQ9 QUESTIONS 1 & 2: 0
2. FEELING DOWN, DEPRESSED OR HOPELESS: 0
SUM OF ALL RESPONSES TO PHQ QUESTIONS 1-9: 0
SUM OF ALL RESPONSES TO PHQ QUESTIONS 1-9: 0
1. LITTLE INTEREST OR PLEASURE IN DOING THINGS: 0
SUM OF ALL RESPONSES TO PHQ QUESTIONS 1-9: 0
SUM OF ALL RESPONSES TO PHQ QUESTIONS 1-9: 0

## 2023-08-15 NOTE — PROGRESS NOTES
Chief Complaint   Patient presents with    Medication Refill    1. \"Have you been to the ER, urgent care clinic since your last visit? Hospitalized since your last visit? \" no  2. \"Have you seen or consulted any other health care providers outside of the [address] [place] [address] since your last visit? \" yes - Dr Mohamud Sam eye   3. For patients over 45: Has the patient had a colonoscopy? Yes     If the patient is female:    4. For patients over 40: Has the patient had a mammogram? Yes    5. For patients over 21: Has the patient had a pap smear?  N/A

## 2023-08-15 NOTE — TELEPHONE ENCOUNTER
Re: Remy EDMONDS request to [facility] [employer]. Awaiting outcome. Faxed update to [place] at [phone].

## 2023-08-15 NOTE — PROGRESS NOTES
Subjective     HPI    Lee Gomez is a 78 y.o. female with a history of migraine, HTN, GERD, colon polyp, osteopenia and osteoarthritis of spine, lumbar spondylosis, appendectomy, DCIS of breast and fibromyalgia, who presents to the office today for f/u of these health problems. Pt notes that she has been having intermittent episodes of constipation for the past few years, but have worsened recently. She states that she has frequent and hard bowel movements. She notes that her stool have been \"rabbit pellets. \" Pt states that she increased her water, fiber, and fruit intake to relieve her symptoms with only temporary / minimal results. She notes that she takes stool softeners if the foods in the previous sentence do not subside her symptoms after 3-4 days. She states that she tried Metamucil a year ago. Pt states that she still has episodes where she gets cold, and stays cold for multiple hours/days in a row. She notes that a cause for this has not been found. Pt notes that she has hot flashes and breaks out in sweats. She states that tamoxifen stopped her periods around 49years old. And caused similar symptoms but she has been off this for years. She notes that her symptoms have not correlated around the period of starting a new medicine. Discussion re: Covid 19 infection, prevention, treatment limitations, importance of masks and distancing, and the available vaccines and now boosters recommended for Pfizex and Moderna at 5-6 months and then again at additional 4 months for those at high risk. The new recommendation Sept '22 to get the bivalent vaccine booster for the Omicron variant. .     Pt states that a nurse at [facility] told her to start taking Vitamin B due to low Vitamin B levels. She takes tramadol on average once a day in the evenings for her joint pain, and uses Celebrex for the same thing.      She notes that she has a large floater in her L eye that Dr. Joel Perry (ophthalmologist) is

## 2023-08-16 LAB
ALBUMIN SERPL-MCNC: 3.8 G/DL (ref 3.5–5)
ALBUMIN/GLOB SERPL: 1.3 (ref 1.1–2.2)
ALP SERPL-CCNC: 159 U/L (ref 45–117)
ALT SERPL-CCNC: 35 U/L (ref 12–78)
ANION GAP SERPL CALC-SCNC: 4 MMOL/L (ref 5–15)
AST SERPL-CCNC: 17 U/L (ref 15–37)
BILIRUB SERPL-MCNC: 0.5 MG/DL (ref 0.2–1)
BUN SERPL-MCNC: 15 MG/DL (ref 6–20)
BUN/CREAT SERPL: 18 (ref 12–20)
CALCIUM SERPL-MCNC: 9.2 MG/DL (ref 8.5–10.1)
CHLORIDE SERPL-SCNC: 107 MMOL/L (ref 97–108)
CO2 SERPL-SCNC: 29 MMOL/L (ref 21–32)
CREAT SERPL-MCNC: 0.82 MG/DL (ref 0.55–1.02)
ERYTHROCYTE [DISTWIDTH] IN BLOOD BY AUTOMATED COUNT: 13.2 % (ref 11.5–14.5)
GLOBULIN SER CALC-MCNC: 3 G/DL (ref 2–4)
GLUCOSE SERPL-MCNC: 108 MG/DL (ref 65–100)
HCT VFR BLD AUTO: 42.3 % (ref 35–47)
HGB BLD-MCNC: 13.5 G/DL (ref 11.5–16)
MCH RBC QN AUTO: 30.6 PG (ref 26–34)
MCHC RBC AUTO-ENTMCNC: 31.9 G/DL (ref 30–36.5)
MCV RBC AUTO: 95.9 FL (ref 80–99)
NRBC # BLD: 0 K/UL (ref 0–0.01)
NRBC BLD-RTO: 0 PER 100 WBC
PLATELET # BLD AUTO: 319 K/UL (ref 150–400)
PMV BLD AUTO: 9.7 FL (ref 8.9–12.9)
POTASSIUM SERPL-SCNC: 4.3 MMOL/L (ref 3.5–5.1)
PROT SERPL-MCNC: 6.8 G/DL (ref 6.4–8.2)
RBC # BLD AUTO: 4.41 M/UL (ref 3.8–5.2)
SODIUM SERPL-SCNC: 140 MMOL/L (ref 136–145)
TSH SERPL DL<=0.05 MIU/L-ACNC: 1.54 UIU/ML (ref 0.36–3.74)
WBC # BLD AUTO: 7.1 K/UL (ref 3.6–11)

## 2023-08-28 NOTE — TELEPHONE ENCOUNTER
Re: Evaristo EDMONDS letter, dated 08/16/23. Per letter Oneil Eliel is a non-covered benefit. Scanned letter to chart.

## 2023-11-07 ENCOUNTER — ANESTHESIA EVENT (OUTPATIENT)
Facility: HOSPITAL | Age: 68
End: 2023-11-07
Payer: MEDICARE

## 2023-11-07 ENCOUNTER — ANESTHESIA (OUTPATIENT)
Facility: HOSPITAL | Age: 68
End: 2023-11-07
Payer: MEDICARE

## 2023-11-07 ENCOUNTER — HOSPITAL ENCOUNTER (OUTPATIENT)
Facility: HOSPITAL | Age: 68
Setting detail: OUTPATIENT SURGERY
Discharge: HOME | End: 2023-11-07
Attending: SPECIALIST | Admitting: SPECIALIST
Payer: MEDICARE

## 2023-11-07 VITALS
OXYGEN SATURATION: 99 % | WEIGHT: 160 LBS | HEART RATE: 84 BPM | BODY MASS INDEX: 29.44 KG/M2 | TEMPERATURE: 97.7 F | RESPIRATION RATE: 14 BRPM | SYSTOLIC BLOOD PRESSURE: 106 MMHG | DIASTOLIC BLOOD PRESSURE: 72 MMHG | HEIGHT: 62 IN

## 2023-11-07 PROCEDURE — 3700000001 HC ADD 15 MINUTES (ANESTHESIA): Performed by: SPECIALIST

## 2023-11-07 PROCEDURE — 88305 TISSUE EXAM BY PATHOLOGIST: CPT

## 2023-11-07 PROCEDURE — 3600007512: Performed by: SPECIALIST

## 2023-11-07 PROCEDURE — 2709999900 HC NON-CHARGEABLE SUPPLY: Performed by: SPECIALIST

## 2023-11-07 PROCEDURE — 3700000000 HC ANESTHESIA ATTENDED CARE: Performed by: SPECIALIST

## 2023-11-07 PROCEDURE — 7100000011 HC PHASE II RECOVERY - ADDTL 15 MIN: Performed by: SPECIALIST

## 2023-11-07 PROCEDURE — 2580000003 HC RX 258: Performed by: SPECIALIST

## 2023-11-07 PROCEDURE — 7100000010 HC PHASE II RECOVERY - FIRST 15 MIN: Performed by: SPECIALIST

## 2023-11-07 PROCEDURE — 6360000002 HC RX W HCPCS: Performed by: NURSE ANESTHETIST, CERTIFIED REGISTERED

## 2023-11-07 PROCEDURE — 3600007502: Performed by: SPECIALIST

## 2023-11-07 RX ORDER — PHENYLEPHRINE HCL IN 0.9% NACL 0.4MG/10ML
SYRINGE (ML) INTRAVENOUS PRN
Status: DISCONTINUED | OUTPATIENT
Start: 2023-11-07 | End: 2023-11-07 | Stop reason: SDUPTHER

## 2023-11-07 RX ORDER — SODIUM CHLORIDE 9 MG/ML
INJECTION, SOLUTION INTRAVENOUS CONTINUOUS
Status: DISCONTINUED | OUTPATIENT
Start: 2023-11-07 | End: 2023-11-07 | Stop reason: HOSPADM

## 2023-11-07 RX ORDER — SODIUM CHLORIDE 9 MG/ML
25 INJECTION, SOLUTION INTRAVENOUS PRN
Status: DISCONTINUED | OUTPATIENT
Start: 2023-11-07 | End: 2023-11-07 | Stop reason: HOSPADM

## 2023-11-07 RX ORDER — SODIUM CHLORIDE 0.9 % (FLUSH) 0.9 %
5-40 SYRINGE (ML) INJECTION PRN
Status: DISCONTINUED | OUTPATIENT
Start: 2023-11-07 | End: 2023-11-07 | Stop reason: HOSPADM

## 2023-11-07 RX ORDER — SODIUM CHLORIDE 0.9 % (FLUSH) 0.9 %
5-40 SYRINGE (ML) INJECTION EVERY 12 HOURS SCHEDULED
Status: DISCONTINUED | OUTPATIENT
Start: 2023-11-07 | End: 2023-11-07 | Stop reason: HOSPADM

## 2023-11-07 RX ADMIN — Medication 120 MCG: at 16:16

## 2023-11-07 RX ADMIN — PROPOFOL 25 MG: 10 INJECTION, EMULSION INTRAVENOUS at 16:26

## 2023-11-07 RX ADMIN — PROPOFOL 25 MG: 10 INJECTION, EMULSION INTRAVENOUS at 16:14

## 2023-11-07 RX ADMIN — PROPOFOL 25 MG: 10 INJECTION, EMULSION INTRAVENOUS at 16:18

## 2023-11-07 RX ADMIN — PROPOFOL 25 MG: 10 INJECTION, EMULSION INTRAVENOUS at 16:25

## 2023-11-07 RX ADMIN — PROPOFOL 25 MG: 10 INJECTION, EMULSION INTRAVENOUS at 16:27

## 2023-11-07 RX ADMIN — SODIUM CHLORIDE: 9 INJECTION, SOLUTION INTRAVENOUS at 16:00

## 2023-11-07 RX ADMIN — PROPOFOL 25 MG: 10 INJECTION, EMULSION INTRAVENOUS at 16:19

## 2023-11-07 RX ADMIN — PROPOFOL 25 MG: 10 INJECTION, EMULSION INTRAVENOUS at 16:35

## 2023-11-07 RX ADMIN — PROPOFOL 25 MG: 10 INJECTION, EMULSION INTRAVENOUS at 16:29

## 2023-11-07 RX ADMIN — PROPOFOL 25 MG: 10 INJECTION, EMULSION INTRAVENOUS at 16:28

## 2023-11-07 RX ADMIN — PROPOFOL 25 MG: 10 INJECTION, EMULSION INTRAVENOUS at 16:32

## 2023-11-07 RX ADMIN — PROPOFOL 50 MG: 10 INJECTION, EMULSION INTRAVENOUS at 16:23

## 2023-11-07 RX ADMIN — PROPOFOL 25 MG: 10 INJECTION, EMULSION INTRAVENOUS at 16:16

## 2023-11-07 RX ADMIN — PROPOFOL 50 MG: 10 INJECTION, EMULSION INTRAVENOUS at 16:11

## 2023-11-07 RX ADMIN — PROPOFOL 25 MG: 10 INJECTION, EMULSION INTRAVENOUS at 16:33

## 2023-11-07 RX ADMIN — PROPOFOL 50 MG: 10 INJECTION, EMULSION INTRAVENOUS at 16:21

## 2023-11-07 RX ADMIN — PROPOFOL 25 MG: 10 INJECTION, EMULSION INTRAVENOUS at 16:31

## 2023-11-07 RX ADMIN — PROPOFOL 25 MG: 10 INJECTION, EMULSION INTRAVENOUS at 16:12

## 2023-11-07 RX ADMIN — PROPOFOL 25 MG: 10 INJECTION, EMULSION INTRAVENOUS at 16:36

## 2023-11-07 RX ADMIN — PROPOFOL 25 MG: 10 INJECTION, EMULSION INTRAVENOUS at 16:34

## 2023-11-07 NOTE — H&P
Pre-endoscopy H and P for Colonoscopy    The patient was seen and examined. Date of last colonoscopy: 2019, Polyps  Yes      The airway was assessed and documented. The problem list, past medical history, and medications were reviewed. Patient Active Problem List   Diagnosis    ARTIS (dyspnea on exertion)    Migraine    Spondylosis of lumbar region without myelopathy or radiculopathy    Colonic polyp    Essential hypertension    Pedal edema    Arthritis    DCIS (ductal carcinoma in situ) of breast    History of migraine    Fibromyalgia    Osteoarthritis of spine    Vitamin D deficiency    Osteopenia of lumbar spine    History of migraine headaches    GERD (gastroesophageal reflux disease)    Vitamin B 12 deficiency     Social History     Socioeconomic History    Marital status:      Spouse name: Not on file    Number of children: Not on file    Years of education: Not on file    Highest education level: Not on file   Occupational History    Not on file   Tobacco Use    Smoking status: Never    Smokeless tobacco: Never   Substance and Sexual Activity    Alcohol use: No    Drug use: No    Sexual activity: Not on file   Other Topics Concern    Not on file   Social History Narrative    Not on file     Social Determinants of Health     Financial Resource Strain: Low Risk  (8/15/2023)    Overall Financial Resource Strain (CARDIA)     Difficulty of Paying Living Expenses: Not hard at all   Food Insecurity: No Food Insecurity (8/15/2023)    Hunger Vital Sign     Worried About Running Out of Food in the Last Year: Never true     Ran Out of Food in the Last Year: Never true   Transportation Needs: Unknown (8/15/2023)    PRAPARE - Transportation     Lack of Transportation (Medical): Not on file     Lack of Transportation (Non-Medical):  No   Physical Activity: Not on file   Stress: Not on file   Social Connections: Not on file   Intimate Partner Violence: Not on file   Housing Stability: Unknown (8/15/2023)    Housing

## 2023-11-07 NOTE — OP NOTE
[facility]  [address] [place], [place] [address], [address] [facility]                 Colonoscopy Procedure Note    Indications:   See Preoperative Diagnosis above  Referring Physician: Alla Wade MD  Anesthesia/Sedation: MAC anesthesia Propofol  Endoscopist:  Dr. Augusta Amor  Assistant:  Circulator: Jeff Plummer RN  Endoscopy Technician: Emily Jones  Preoperative diagnosis: Benign neoplasm of ascending colon [D12.2]  Benign neoplasm of transverse colon [D12.3]  Family history of colon cancer [Z80.0]  Family history of colonic polyps [Z83.71]  Personal history of colonic polyps [Z86.010]    Procedure in Detail:  Informed consent was obtained for the procedure, including sedation. Risks of perforation, hemorrhage, adverse drug reaction, and aspiration were discussed. The patient was placed in the left lateral decubitus position. Based on the pre-procedure assessment, including review of the patient's medical history, medications, allergies, and review of systems, she had been deemed to be an appropriate candidate for  sedation; she was therefore sedated with the medications listed above. The patient was monitored continuously with ECG tracing, pulse oximetry, blood pressure monitoring, and direct observations. A rectal examination was performed. The [id number] was inserted into the rectum and advanced under direct vision to the cecum, which was identified by the ileocecal valve and appendiceal orifice. The quality of the colonic preparation was fair. Excessive looping of the colonoscope was encountered and cecum was reached with difficulty. A careful inspection was made as the colonoscope was withdrawn, including a retroflexed view of the rectum; findings and interventions are described below. Appropriate photodocumentation was obtained.     Findings:  Rectum: normal  Sigmoid: normal  Descending Colon: normal  Transverse Colon: normal  Ascending Colon: 12 mm sessile polyp removed with hot

## 2023-11-07 NOTE — DISCHARGE INSTRUCTIONS
adapted under license by your healthcare professional. If you have questions about a medical condition or this instruction, always ask your healthcare professional. [address] any warranty or liability for your use of this information.

## 2023-11-07 NOTE — PROGRESS NOTES

## 2023-11-22 ENCOUNTER — CLINICAL DOCUMENTATION (OUTPATIENT)
Age: 68
End: 2023-11-22

## 2023-11-22 ENCOUNTER — OFFICE VISIT (OUTPATIENT)
Age: 68
End: 2023-11-22
Payer: MEDICARE

## 2023-11-22 VITALS
SYSTOLIC BLOOD PRESSURE: 132 MMHG | HEART RATE: 88 BPM | WEIGHT: 155 LBS | BODY MASS INDEX: 28.52 KG/M2 | HEIGHT: 62 IN | DIASTOLIC BLOOD PRESSURE: 80 MMHG | OXYGEN SATURATION: 97 %

## 2023-11-22 DIAGNOSIS — M79.7 FIBROMYALGIA: ICD-10-CM

## 2023-11-22 DIAGNOSIS — G43.009 MIGRAINE WITHOUT AURA, NOT INTRACTABLE, WITHOUT STATUS MIGRAINOSUS: Primary | ICD-10-CM

## 2023-11-22 PROCEDURE — 1036F TOBACCO NON-USER: CPT | Performed by: PSYCHIATRY & NEUROLOGY

## 2023-11-22 PROCEDURE — 1090F PRES/ABSN URINE INCON ASSESS: CPT | Performed by: PSYCHIATRY & NEUROLOGY

## 2023-11-22 PROCEDURE — 3079F DIAST BP 80-89 MM HG: CPT | Performed by: PSYCHIATRY & NEUROLOGY

## 2023-11-22 PROCEDURE — 3017F COLORECTAL CA SCREEN DOC REV: CPT | Performed by: PSYCHIATRY & NEUROLOGY

## 2023-11-22 PROCEDURE — 1123F ACP DISCUSS/DSCN MKR DOCD: CPT | Performed by: PSYCHIATRY & NEUROLOGY

## 2023-11-22 PROCEDURE — G8400 PT W/DXA NO RESULTS DOC: HCPCS | Performed by: PSYCHIATRY & NEUROLOGY

## 2023-11-22 PROCEDURE — G8427 DOCREV CUR MEDS BY ELIG CLIN: HCPCS | Performed by: PSYCHIATRY & NEUROLOGY

## 2023-11-22 PROCEDURE — G8484 FLU IMMUNIZE NO ADMIN: HCPCS | Performed by: PSYCHIATRY & NEUROLOGY

## 2023-11-22 PROCEDURE — 3075F SYST BP GE 130 - 139MM HG: CPT | Performed by: PSYCHIATRY & NEUROLOGY

## 2023-11-22 PROCEDURE — G8419 CALC BMI OUT NRM PARAM NOF/U: HCPCS | Performed by: PSYCHIATRY & NEUROLOGY

## 2023-11-22 PROCEDURE — 99214 OFFICE O/P EST MOD 30 MIN: CPT | Performed by: PSYCHIATRY & NEUROLOGY

## 2023-11-22 RX ORDER — DULOXETIN HYDROCHLORIDE 60 MG/1
60 CAPSULE, DELAYED RELEASE ORAL DAILY
Qty: 90 CAPSULE | Refills: 1 | Status: SHIPPED | OUTPATIENT
Start: 2023-11-22

## 2023-11-22 NOTE — PROGRESS NOTES
5/22/2024). I have discussed the diagnosis with the patient today and the intended plan as seen in the above orders with both the patient as well as referring provider and/or PCP via electronic correspondence. The patient has received an after-visit summary and questions were answered concerning future plans. I have discussed medication side effects and warnings with the patient as well.       Signed:  Elisabet Avila DO  11/22/23

## 2023-11-25 ENCOUNTER — TELEPHONE (OUTPATIENT)
Age: 68
End: 2023-11-25

## 2023-11-26 DIAGNOSIS — M79.7 FIBROMYALGIA: Primary | ICD-10-CM

## 2023-11-27 RX ORDER — TRAMADOL HYDROCHLORIDE 50 MG/1
TABLET ORAL
Qty: 90 TABLET | Refills: 0 | Status: SHIPPED | OUTPATIENT
Start: 2023-11-27 | End: 2023-12-27

## 2023-11-30 ENCOUNTER — TELEPHONE (OUTPATIENT)
Age: 68
End: 2023-11-30

## 2023-11-30 ENCOUNTER — HOSPITAL ENCOUNTER (OUTPATIENT)
Facility: HOSPITAL | Age: 68
Discharge: HOME | End: 2023-11-30
Attending: FAMILY MEDICINE
Payer: MEDICARE

## 2023-11-30 DIAGNOSIS — Z12.31 VISIT FOR SCREENING MAMMOGRAM: ICD-10-CM

## 2023-11-30 PROCEDURE — 77067 SCR MAMMO BI INCL CAD: CPT

## 2023-11-30 NOTE — TELEPHONE ENCOUNTER
Pt called said she is scheduled today at 13:30 ,[address] for a mammogram, and they are requesting an order. Pt was unable to provide fax and contact #.      BCB# [phone]

## 2024-02-19 ENCOUNTER — OFFICE VISIT (OUTPATIENT)
Age: 69
End: 2024-02-19
Payer: MEDICARE

## 2024-02-19 VITALS
DIASTOLIC BLOOD PRESSURE: 72 MMHG | HEIGHT: 62 IN | TEMPERATURE: 97.8 F | HEART RATE: 68 BPM | WEIGHT: 158 LBS | OXYGEN SATURATION: 97 % | BODY MASS INDEX: 29.08 KG/M2 | SYSTOLIC BLOOD PRESSURE: 132 MMHG | RESPIRATION RATE: 16 BRPM

## 2024-02-19 DIAGNOSIS — I10 ESSENTIAL HYPERTENSION: Primary | ICD-10-CM

## 2024-02-19 DIAGNOSIS — E55.9 VITAMIN D DEFICIENCY: ICD-10-CM

## 2024-02-19 DIAGNOSIS — M48.07 SPINAL STENOSIS, LUMBOSACRAL REGION: ICD-10-CM

## 2024-02-19 DIAGNOSIS — K21.01 GASTROESOPHAGEAL REFLUX DISEASE WITH ESOPHAGITIS AND HEMORRHAGE: ICD-10-CM

## 2024-02-19 DIAGNOSIS — E53.8 VITAMIN B 12 DEFICIENCY: ICD-10-CM

## 2024-02-19 DIAGNOSIS — R53.82 CHRONIC FATIGUE: ICD-10-CM

## 2024-02-19 DIAGNOSIS — Z11.59 NEED FOR HEPATITIS C SCREENING TEST: ICD-10-CM

## 2024-02-19 PROCEDURE — G8400 PT W/DXA NO RESULTS DOC: HCPCS | Performed by: FAMILY MEDICINE

## 2024-02-19 PROCEDURE — 3075F SYST BP GE 130 - 139MM HG: CPT | Performed by: FAMILY MEDICINE

## 2024-02-19 PROCEDURE — G8419 CALC BMI OUT NRM PARAM NOF/U: HCPCS | Performed by: FAMILY MEDICINE

## 2024-02-19 PROCEDURE — 1123F ACP DISCUSS/DSCN MKR DOCD: CPT | Performed by: FAMILY MEDICINE

## 2024-02-19 PROCEDURE — 99214 OFFICE O/P EST MOD 30 MIN: CPT | Performed by: FAMILY MEDICINE

## 2024-02-19 PROCEDURE — G8484 FLU IMMUNIZE NO ADMIN: HCPCS | Performed by: FAMILY MEDICINE

## 2024-02-19 PROCEDURE — 1090F PRES/ABSN URINE INCON ASSESS: CPT | Performed by: FAMILY MEDICINE

## 2024-02-19 PROCEDURE — G8427 DOCREV CUR MEDS BY ELIG CLIN: HCPCS | Performed by: FAMILY MEDICINE

## 2024-02-19 PROCEDURE — 3017F COLORECTAL CA SCREEN DOC REV: CPT | Performed by: FAMILY MEDICINE

## 2024-02-19 PROCEDURE — 3078F DIAST BP <80 MM HG: CPT | Performed by: FAMILY MEDICINE

## 2024-02-19 PROCEDURE — 1036F TOBACCO NON-USER: CPT | Performed by: FAMILY MEDICINE

## 2024-02-19 ASSESSMENT — PATIENT HEALTH QUESTIONNAIRE - PHQ9
SUM OF ALL RESPONSES TO PHQ QUESTIONS 1-9: 0
SUM OF ALL RESPONSES TO PHQ QUESTIONS 1-9: 0
1. LITTLE INTEREST OR PLEASURE IN DOING THINGS: 0
SUM OF ALL RESPONSES TO PHQ QUESTIONS 1-9: 0
SUM OF ALL RESPONSES TO PHQ9 QUESTIONS 1 & 2: 0
SUM OF ALL RESPONSES TO PHQ QUESTIONS 1-9: 0
2. FEELING DOWN, DEPRESSED OR HOPELESS: 0

## 2024-02-19 ASSESSMENT — ENCOUNTER SYMPTOMS
WHEEZING: 0
EYE PAIN: 0
COUGH: 0
EYE REDNESS: 0
SHORTNESS OF BREATH: 0

## 2024-02-19 NOTE — PROGRESS NOTES
Chief Complaint   Patient presents with    Hypertension    get cold that affect joints      \"Have you been to the ER, urgent care clinic since your last visit?  Hospitalized since your last visit?\"    NO    “Have you seen or consulted any other health care providers outside of [facility] since your last visit?”    NO          
(CYMBALTA) 60 MG extended release capsule Take 1 capsule by mouth daily 90 capsule 1    metoprolol tartrate (LOPRESSOR) 50 MG tablet Take 1 tablet by mouth 2 times daily 180 tablet 1    esomeprazole (NEXIUM) 40 MG delayed release capsule Take 1 capsule by mouth 2 times daily 180 capsule 1    celecoxib (CELEBREX) 200 MG capsule TAKE 1 CAPSULE BY MOUTH TWICE DAILY      vitamin D (CHOLECALCIFEROL) 125 MCG (5000 UT) CAPS capsule Take by mouth daily      sublingual tablet cyanocobalamin 2500 MCG SUBL Take by mouth daily       No current facility-administered medications on file prior to visit.     Allergies   Allergen Reactions    Aspirin Nausea And Vomiting    Nsaids Nausea And Vomiting    Povidone-Iodine Rash     Family History   Problem Relation Age of Onset    Heart Disease Father     Hypertension Father     Kidney Disease Father     Thyroid Disease Father     Stroke Maternal Grandmother 68    Diabetes Maternal Grandfather 87    COPD Maternal Grandfather     Hypertension Paternal Grandmother 96    Coronary Art Dis Paternal Grandfather 86    Hypertension Paternal Grandfather     Coronary Art Dis Mother     Hypertension Mother     Heart Disease Mother     Diabetes Mother     Pulmonary Embolism Mother     Coronary Art Dis Father      Social History     Socioeconomic History    Marital status:      Spouse name: None    Number of children: None    Years of education: None    Highest education level: None   Tobacco Use    Smoking status: Never    Smokeless tobacco: Never   Vaping Use    Vaping Use: Never used   Substance and Sexual Activity    Alcohol use: No    Drug use: No     Social Determinants of Health     Financial Resource Strain: Low Risk  (8/15/2023)    Overall Financial Resource Strain (CARDIA)     Difficulty of Paying Living Expenses: Not hard at all   Transportation Needs: Unknown (8/15/2023)    PRAPARE - Transportation     Lack of Transportation (Non-Medical): No   Housing Stability: Unknown

## 2024-02-20 LAB
25(OH)D3 SERPL-MCNC: 70.5 NG/ML (ref 30–100)
ALBUMIN SERPL-MCNC: 3.7 G/DL (ref 3.5–5)
ALBUMIN/GLOB SERPL: 1.1 (ref 1.1–2.2)
ALP SERPL-CCNC: 171 U/L (ref 45–117)
ALT SERPL-CCNC: 31 U/L (ref 12–78)
ANION GAP SERPL CALC-SCNC: 3 MMOL/L (ref 5–15)
AST SERPL-CCNC: 17 U/L (ref 15–37)
BILIRUB SERPL-MCNC: 0.6 MG/DL (ref 0.2–1)
BUN SERPL-MCNC: 15 MG/DL (ref 6–20)
BUN/CREAT SERPL: 21 (ref 12–20)
CALCIUM SERPL-MCNC: 9.6 MG/DL (ref 8.5–10.1)
CHLORIDE SERPL-SCNC: 107 MMOL/L (ref 97–108)
CHOLEST SERPL-MCNC: 183 MG/DL
CO2 SERPL-SCNC: 30 MMOL/L (ref 21–32)
CREAT SERPL-MCNC: 0.73 MG/DL (ref 0.55–1.02)
ERYTHROCYTE [DISTWIDTH] IN BLOOD BY AUTOMATED COUNT: 13.3 % (ref 11.5–14.5)
GLOBULIN SER CALC-MCNC: 3.3 G/DL (ref 2–4)
GLUCOSE SERPL-MCNC: 97 MG/DL (ref 65–100)
HCT VFR BLD AUTO: 40.3 % (ref 35–47)
HCV AB SER IA-ACNC: 0.11 INDEX
HCV AB SERPL QL IA: NONREACTIVE
HDLC SERPL-MCNC: 59 MG/DL
HDLC SERPL: 3.1 (ref 0–5)
HGB BLD-MCNC: 13.6 G/DL (ref 11.5–16)
LDLC SERPL CALC-MCNC: 90.4 MG/DL (ref 0–100)
MCH RBC QN AUTO: 31.1 PG (ref 26–34)
MCHC RBC AUTO-ENTMCNC: 33.7 G/DL (ref 30–36.5)
MCV RBC AUTO: 92.2 FL (ref 80–99)
NRBC # BLD: 0 K/UL (ref 0–0.01)
NRBC BLD-RTO: 0 PER 100 WBC
PLATELET # BLD AUTO: 302 K/UL (ref 150–400)
PMV BLD AUTO: 9.7 FL (ref 8.9–12.9)
POTASSIUM SERPL-SCNC: 4.2 MMOL/L (ref 3.5–5.1)
PROT SERPL-MCNC: 7 G/DL (ref 6.4–8.2)
RBC # BLD AUTO: 4.37 M/UL (ref 3.8–5.2)
SODIUM SERPL-SCNC: 140 MMOL/L (ref 136–145)
T4 FREE SERPL-MCNC: 0.9 NG/DL (ref 0.8–1.5)
TRIGL SERPL-MCNC: 168 MG/DL
TSH SERPL DL<=0.05 MIU/L-ACNC: 1.77 UIU/ML (ref 0.36–3.74)
VIT B12 SERPL-MCNC: >2000 PG/ML (ref 193–986)
VLDLC SERPL CALC-MCNC: 33.6 MG/DL
WBC # BLD AUTO: 8.2 K/UL (ref 3.6–11)

## 2024-02-21 DIAGNOSIS — G47.19 EXCESSIVE DAYTIME SLEEPINESS: Primary | ICD-10-CM

## 2024-02-23 DIAGNOSIS — M79.7 FIBROMYALGIA: ICD-10-CM

## 2024-02-23 RX ORDER — TRAMADOL HYDROCHLORIDE 50 MG/1
50 TABLET ORAL EVERY 6 HOURS PRN
Qty: 90 TABLET | Refills: 1 | Status: SHIPPED | OUTPATIENT
Start: 2024-02-23 | End: 2024-03-24

## 2024-03-19 DIAGNOSIS — K21.00 GASTRO-ESOPHAGEAL REFLUX DISEASE WITH ESOPHAGITIS, WITHOUT BLEEDING: ICD-10-CM

## 2024-03-20 RX ORDER — ESOMEPRAZOLE MAGNESIUM 40 MG/1
40 CAPSULE, DELAYED RELEASE ORAL 2 TIMES DAILY
Qty: 180 CAPSULE | Refills: 3 | Status: SHIPPED | OUTPATIENT
Start: 2024-03-20

## 2024-04-10 RX ORDER — CELECOXIB 200 MG/1
CAPSULE ORAL
Qty: 180 CAPSULE | Refills: 2 | Status: SHIPPED | OUTPATIENT
Start: 2024-04-10

## 2024-04-23 ENCOUNTER — TELEPHONE (OUTPATIENT)
Age: 69
End: 2024-04-23

## 2024-04-23 NOTE — TELEPHONE ENCOUNTER
Maryanne called and stated that the patient needs a PA for the Esomeprazole. Ins will only pay for 1 time daily. The script is for 2x a day.    Call back [phone]

## 2024-05-11 ASSESSMENT — SLEEP AND FATIGUE QUESTIONNAIRES
HOW LIKELY ARE YOU TO NOD OFF OR FALL ASLEEP IN A CAR, WHILE STOPPED FOR A FEW MINUTES IN TRAFFIC: WOULD NEVER DOZE
ARE YOU BOTHERED BY WAKING UP TOO EARLY AND NOT BEING ABLE TO GET BACK TO SLEEP: YES
HOW LIKELY ARE YOU TO NOD OFF OR FALL ASLEEP WHILE SITTING QUIETLY AFTER LUNCH WITHOUT ALCOHOL: SLIGHT CHANCE OF DOZING
AVERAGE NUMBER OF SLEEP HOURS: 6
DO YOU GET TOO LITTLE SLEEP AT NIGHT: YES
HOW LIKELY ARE YOU TO NOD OFF OR FALL ASLEEP WHILE SITTING AND TALKING TO SOMEONE: WOULD NEVER DOZE
HOW LIKELY ARE YOU TO NOD OFF OR FALL ASLEEP WHILE WATCHING TV: SLIGHT CHANCE OF DOZING
HOW LIKELY ARE YOU TO NOD OFF OR FALL ASLEEP WHILE LYING DOWN TO REST IN THE AFTERNOON WHEN CIRCUMSTANCES PERMIT: MODERATE CHANCE OF DOZING
HOW LIKELY ARE YOU TO NOD OFF OR FALL ASLEEP WHILE SITTING AND READING: SLIGHT CHANCE OF DOZING
HOW LIKELY ARE YOU TO NOD OFF OR FALL ASLEEP WHILE SITTING INACTIVE IN A PUBLIC PLACE: WOULD NEVER DOZE
ESS TOTAL SCORE: 5
HOW LIKELY ARE YOU TO NOD OFF OR FALL ASLEEP WHEN YOU ARE A PASSENGER IN A CAR FOR AN HOUR WITHOUT A BREAK: WOULD NEVER DOZE

## 2024-05-18 ENCOUNTER — APPOINTMENT (OUTPATIENT)
Facility: HOSPITAL | Age: 69
End: 2024-05-18
Payer: MEDICARE

## 2024-05-18 ENCOUNTER — HOSPITAL ENCOUNTER (EMERGENCY)
Facility: HOSPITAL | Age: 69
Discharge: HOME | End: 2024-05-18
Attending: EMERGENCY MEDICINE
Payer: MEDICARE

## 2024-05-18 VITALS
RESPIRATION RATE: 20 BRPM | TEMPERATURE: 98.7 F | BODY MASS INDEX: 29.58 KG/M2 | HEART RATE: 89 BPM | OXYGEN SATURATION: 95 % | SYSTOLIC BLOOD PRESSURE: 124 MMHG | DIASTOLIC BLOOD PRESSURE: 81 MMHG | WEIGHT: 160.72 LBS | HEIGHT: 62 IN

## 2024-05-18 DIAGNOSIS — J18.9 PNEUMONIA OF LEFT LOWER LOBE DUE TO INFECTIOUS ORGANISM: ICD-10-CM

## 2024-05-18 DIAGNOSIS — R05.1 ACUTE COUGH: Primary | ICD-10-CM

## 2024-05-18 LAB
FLUAV AG NPH QL IA: NEGATIVE
FLUBV AG NOSE QL IA: NEGATIVE
SARS-COV-2 RDRP RESP QL NAA+PROBE: NOT DETECTED
SOURCE: NORMAL

## 2024-05-18 PROCEDURE — 71046 X-RAY EXAM CHEST 2 VIEWS: CPT

## 2024-05-18 PROCEDURE — 87635 SARS-COV-2 COVID-19 AMP PRB: CPT

## 2024-05-18 PROCEDURE — 6370000000 HC RX 637 (ALT 250 FOR IP): Performed by: EMERGENCY MEDICINE

## 2024-05-18 PROCEDURE — 99284 EMERGENCY DEPT VISIT MOD MDM: CPT

## 2024-05-18 PROCEDURE — 87804 INFLUENZA ASSAY W/OPTIC: CPT

## 2024-05-18 RX ORDER — BENZONATATE 100 MG/1
100 CAPSULE ORAL 3 TIMES DAILY PRN
Qty: 30 CAPSULE | Refills: 0 | Status: SHIPPED | OUTPATIENT
Start: 2024-05-18 | End: 2024-05-28

## 2024-05-18 RX ORDER — AMOXICILLIN AND CLAVULANATE POTASSIUM 875; 125 MG/1; MG/1
1 TABLET, FILM COATED ORAL
Status: COMPLETED | OUTPATIENT
Start: 2024-05-18 | End: 2024-05-18

## 2024-05-18 RX ORDER — AMOXICILLIN AND CLAVULANATE POTASSIUM 875; 125 MG/1; MG/1
1 TABLET, FILM COATED ORAL 2 TIMES DAILY
Qty: 14 TABLET | Refills: 0 | Status: SHIPPED | OUTPATIENT
Start: 2024-05-18 | End: 2024-05-25

## 2024-05-18 RX ADMIN — AMOXICILLIN AND CLAVULANATE POTASSIUM 1 TABLET: 875; 125 TABLET, FILM COATED ORAL at 17:54

## 2024-05-18 ASSESSMENT — PAIN - FUNCTIONAL ASSESSMENT: PAIN_FUNCTIONAL_ASSESSMENT: 0-10

## 2024-05-18 ASSESSMENT — PAIN SCALES - GENERAL: PAINLEVEL_OUTOF10: 8

## 2024-05-18 ASSESSMENT — PAIN DESCRIPTION - DESCRIPTORS: DESCRIPTORS: ACHING

## 2024-05-18 NOTE — ED PROVIDER NOTES
[facility]  EMERGENCY DEPARTMENT ENCOUNTER      Pt Name: Delores Rogers  MRN: [id number]  Birthdate 1955  Date of evaluation: 2024  Provider: Alma Dotson DO    CHIEF COMPLAINT       Chief Complaint   Patient presents with    Cough    Fever    Pharyngitis         HISTORY OF PRESENT ILLNESS   (Location/Symptom, Timing/Onset, Context/Setting, Quality, Duration, Modifying Factors, Severity)  Note limiting factors.   HPI      Review of External Medical Records:     Nursing Notes were reviewed.    REVIEW OF SYSTEMS    (2-9 systems for level 4, 10 or more for level 5)     Review of Systems    Except as noted above the remainder of the review of systems was reviewed and negative.       PAST MEDICAL HISTORY     Past Medical History:   Diagnosis Date    Arthritis 2010    Cancer (HCC) 2008    right breast cancer    Colon polyp     DCIS (ductal carcinoma in situ) of breast 2010    right/surgery and radiation    Fibromyalgia 2010    GERD (gastroesophageal reflux disease) 2010    History of migraine headaches 2017         SURGICAL HISTORY       Past Surgical History:   Procedure Laterality Date    APPENDECTOMY      BREAST BIOPSY Left     Neg    CATARACT EXTRACTION Left 10/31/2023    COLONOSCOPY N/A 2019    COLONOSCOPY performed by Luc Curtis MD at [facility] ENDOSCOPY    COLONOSCOPY N/A 2016    COLONOSCOPY performed by Luc Curtis MD at [facility] ENDOSCOPY    COLONOSCOPY N/A 2022    COLONOSCOPY performed by Aimee Wylie MD at [facility] ENDOSCOPY    COLONOSCOPY N/A 2023    COLONOSCOPY performed by Abdelrahman Blake MD at [facility] ENDOSCOPY    DELIVERY       x2    MASTECTOMY      right,then reconstruction/implant         CURRENT MEDICATIONS       Discharge Medication List as of 2024  5:45 PM        CONTINUE these medications which have NOT CHANGED    Details   celecoxib (CELEBREX) 200 MG capsule TAKE 1 CAPSULE BY MOUTH TWICE DAILY, Disp-180  capsule, R-2Normal      esomeprazole (NEXIUM) 40 MG delayed release capsule TAKE 1 CAPSULE BY MOUTH TWICE DAILY, Disp-180 capsule, R-3Normal      Rimegepant Sulfate 75 MG TBDP Take 75 mg by mouth as needed (Take 75mg PRN at onset of migraine. Max dose 75mg/24h.), Disp-16 tablet, R-5Normal      DULoxetine (CYMBALTA) 60 MG extended release capsule Take 1 capsule by mouth daily, Disp-90 capsule, R-1Normal      metoprolol tartrate (LOPRESSOR) 50 MG tablet Take 1 tablet by mouth 2 times daily, Disp-180 tablet, R-1Normal      vitamin D (CHOLECALCIFEROL) 125 MCG (5000 UT) CAPS capsule Take by mouth dailyHistorical Med      sublingual tablet cyanocobalamin 2500 MCG SUBL Take by mouth dailyHistorical Med             ALLERGIES     Aspirin, Nsaids, and Povidone-iodine    FAMILY HISTORY       Family History   Problem Relation Age of Onset    Heart Disease Father     Hypertension Father     Kidney Disease Father     Thyroid Disease Father     Stroke Maternal Grandmother 68    Diabetes Maternal Grandfather 87    COPD Maternal Grandfather     Hypertension Paternal Grandmother 96    Coronary Art Dis Paternal Grandfather 86    Hypertension Paternal Grandfather     Coronary Art Dis Mother     Hypertension Mother     Heart Disease Mother     Diabetes Mother     Pulmonary Embolism Mother     Coronary Art Dis Father           SOCIAL HISTORY       Social History     Socioeconomic History    Marital status:      Spouse name: None    Number of children: None    Years of education: None    Highest education level: None   Tobacco Use    Smoking status: Never    Smokeless tobacco: Never   Vaping Use    Vaping Use: Never used   Substance and Sexual Activity    Alcohol use: No    Drug use: No     Social Determinants of Health     Financial Resource Strain: Low Risk  (8/15/2023)    Overall Financial Resource Strain (CARDIA)     Difficulty of Paying Living Expenses: Not hard at all   Transportation Needs: Unknown (8/15/2023)    PRAPARE -

## 2024-05-18 NOTE — ED NOTES
I have reviewed discharge instructions with the patient.  The patient verbalized understanding.     Patient ambulated out of the emergency department without assistance escorted by daughter.  Patient is in no apparent distress.

## 2024-05-18 NOTE — ED TRIAGE NOTES
Pt arrives with multiple complaints including fever tmax 100.8- no tylenol or ibuprofen taken today, productive cough, sore throat, headaches. Sxs started 5/13. Son has been sick with same sxs.

## 2024-05-20 ENCOUNTER — TELEPHONE (OUTPATIENT)
Age: 69
End: 2024-05-20

## 2024-05-20 NOTE — TELEPHONE ENCOUNTER
Patient called and stated that she went to [facility] and was diagnosed with pneumonia on 5/18. She stated they told her to be seen in the next couple days. Can you help me find something for her?    Call back [phone]

## 2024-05-20 NOTE — TELEPHONE ENCOUNTER
Spoke to pt and informed her ED follow up has been scheduled for 5/21 at 8:30 am with Dr. Cope. Pt confirmed appt.-TM 5/20/24

## 2024-05-21 ENCOUNTER — OFFICE VISIT (OUTPATIENT)
Age: 69
End: 2024-05-21
Payer: MEDICARE

## 2024-05-21 VITALS
HEIGHT: 62 IN | TEMPERATURE: 97.1 F | BODY MASS INDEX: 28.89 KG/M2 | WEIGHT: 157 LBS | SYSTOLIC BLOOD PRESSURE: 120 MMHG | OXYGEN SATURATION: 97 % | DIASTOLIC BLOOD PRESSURE: 72 MMHG | HEART RATE: 87 BPM | RESPIRATION RATE: 16 BRPM

## 2024-05-21 DIAGNOSIS — J18.9 PNEUMONIA OF LEFT LOWER LOBE DUE TO INFECTIOUS ORGANISM: Primary | ICD-10-CM

## 2024-05-21 PROCEDURE — G8419 CALC BMI OUT NRM PARAM NOF/U: HCPCS | Performed by: FAMILY MEDICINE

## 2024-05-21 PROCEDURE — 1036F TOBACCO NON-USER: CPT | Performed by: FAMILY MEDICINE

## 2024-05-21 PROCEDURE — 3078F DIAST BP <80 MM HG: CPT | Performed by: FAMILY MEDICINE

## 2024-05-21 PROCEDURE — G8400 PT W/DXA NO RESULTS DOC: HCPCS | Performed by: FAMILY MEDICINE

## 2024-05-21 PROCEDURE — 3074F SYST BP LT 130 MM HG: CPT | Performed by: FAMILY MEDICINE

## 2024-05-21 PROCEDURE — G8427 DOCREV CUR MEDS BY ELIG CLIN: HCPCS | Performed by: FAMILY MEDICINE

## 2024-05-21 PROCEDURE — 1090F PRES/ABSN URINE INCON ASSESS: CPT | Performed by: FAMILY MEDICINE

## 2024-05-21 PROCEDURE — 99214 OFFICE O/P EST MOD 30 MIN: CPT | Performed by: FAMILY MEDICINE

## 2024-05-21 PROCEDURE — 1123F ACP DISCUSS/DSCN MKR DOCD: CPT | Performed by: FAMILY MEDICINE

## 2024-05-21 PROCEDURE — 3017F COLORECTAL CA SCREEN DOC REV: CPT | Performed by: FAMILY MEDICINE

## 2024-05-21 RX ORDER — AZITHROMYCIN 250 MG/1
TABLET, FILM COATED ORAL
Qty: 6 TABLET | Refills: 0 | Status: SHIPPED | OUTPATIENT
Start: 2024-05-21 | End: 2024-05-31

## 2024-05-21 ASSESSMENT — PATIENT HEALTH QUESTIONNAIRE - PHQ9
SUM OF ALL RESPONSES TO PHQ QUESTIONS 1-9: 0
1. LITTLE INTEREST OR PLEASURE IN DOING THINGS: NOT AT ALL
2. FEELING DOWN, DEPRESSED OR HOPELESS: NOT AT ALL
SUM OF ALL RESPONSES TO PHQ QUESTIONS 1-9: 0
SUM OF ALL RESPONSES TO PHQ9 QUESTIONS 1 & 2: 0

## 2024-05-21 ASSESSMENT — ENCOUNTER SYMPTOMS
SHORTNESS OF BREATH: 0
EYE REDNESS: 0
EYE PAIN: 0
COUGH: 0
WHEEZING: 0

## 2024-05-21 NOTE — PROGRESS NOTES
"ED Provider Note    Scribed for Ayesha Vieira M.D. by Neeru Michael. 11/21/2019  2:32 PM    Primary care provider: None noted   Means of arrival: Wheel chair  History obtained from: Patient  History limited by: None     CHIEF COMPLAINT  Chief Complaint   Patient presents with   • Leg Pain     left lower, started today   • Dizziness     intermittent over last few days   • Sent from Urgent Care       HPI  Lizzy Walton is a 65 y.o. female with a past medical history of hypertension and stroke in 2017 who presents with complaint of concern for another stroke.  She states that 3 days ago she started feeling a little lightheaded and off-balance.  2 days ago she started noticing that she was \"dragging her left leg a bit.\"  As a result of her left leg weakness she fell and now has pain to her left lower tib-fib and ankle region.  No headache.  No chest pain.  No palpitations.  No shortness of breath.  No vertigo or diplopia.  No weakness to her left upper extremity.  However, she says she fell onto her left side and this morning woke up with a stiff left shoulder.  She has no pain in her left upper extremity unless she moves her arm.  Movement certainly makes the pain worse.  She did not strike her head when she fell.  No numbness or tingling to extremities.  No slurred speech or facial drooping.  Patient states with her stroke in 2017 she experienced vertigo and confusion.  Those were only symptoms.  She did not have any weakness at that time.  Over the last couple days she has felt off balance.  She does not recall falling or listing to any one particular side.  She has not taken any aspirin yet today.    REVIEW OF SYSTEMS  Pertinent positives include lightheadedness, loss of balance, left leg weakness, left lower tib-fib pain, left ankle pain, left shoulder stiffness,   Pertinent negatives include no recent illnesses, fever, cough, headache, chest pain, palpitations, shortness of breath, vertigo, diplopia, left upper "
Chief Complaint   Patient presents with    Cough     Seen in ER short pump pneumonia      \"Have you been to the ER, urgent care clinic since your last visit?  Hospitalized since your last visit?\"    Er for pneumonia    “Have you seen or consulted any other health care providers outside of [facility] since your last visit?”    NO            Click Here for Release of Records Request   
"extremity weakness, head trauma, numbness, tingling, slurred speech, facial drooping, listing or falling to one side.     See HPI for further details. All other systems are negative.    PAST MEDICAL HISTORY  Past Medical History:   Diagnosis Date   • Hypertension    • Stroke (HCC)      FAMILY HISTORY  History reviewed. No pertinent family history.    SOCIAL HISTORY  Social History     Tobacco Use   • Smoking status: Current Every Day Smoker     Packs/day: 0.00     Types: Cigarettes   • Smokeless tobacco: Never Used   Substance Use Topics   • Alcohol use: Not Currently   • Drug use: Not Currently     SURGICAL HISTORY  History reviewed. No pertinent surgical history.    CURRENT MEDICATIONS  Home Medications     Reviewed by Malena Morgan (Pharmacy Tech) on 11/21/19 at 1453  Med List Status: Complete   Medication Last Dose Status   amLODIPine (NORVASC) 10 MG Tab 11/21/2019 Active   Loratadine (CLARITIN) 10 MG Cap FEW DAYS AGO Active   Omega-3 Fatty Acids (FISH OIL) 1000 MG Cap capsule ABOUT 1 WEEK AGO Active                ALLERGIES  No Known Allergies    PHYSICAL EXAM  VITAL SIGNS: /82   Pulse 90   Temp 36.4 °C (97.5 °F) (Temporal)   Resp 14   Ht 1.6 m (5' 3\")   Wt 84.3 kg (185 lb 13.6 oz)   SpO2 96%   BMI 32.92 kg/m²      Constitutional: Well developed, well nourished; No acute distress; Non-toxic appearance.   HENT: Normocephalic, atraumatic; Bilateral external ears normal; Oropharynx with moist mucous membranes; No erythema or exudates in the posterior oropharynx.   Eyes: PERRL, EOMI, Conjunctiva normal. No discharge.   Neck:  Supple, nontender midline; No stridor; No nuchal rigidity.   Lymphatic: No cervical lymphadenopathy noted.   Cardiovascular: Regular rate and rhythm without murmurs, rubs, or gallop.   Thorax & Lungs: No respiratory distress, Decreased breath sounds, No wheezing, rales or rhonchi. Nontender chest wall. No crepitus or subcutaneous air  Abdomen: Soft, nontender, bowel sounds "
normal. No obvious masses; No pulsatile masses; no rebound, guarding, or peritoneal signs.   Skin: Good color; warm and dry without rash or petechia.  Back: Nontender, No CVA tenderness.   Extremities: Tenderness to palpation of left distal tibia, fibula, and ankle with minimal swelling to the left ankle slightly greater than right. Distal radial, dorsalis pedis, posterior tibial pulses are equal bilaterally; No edema; Nontender calves or saphenous, No cyanosis, No clubbing.   Musculoskeletal: Good range of motion in all major joints. No tenderness to palpation or major deformities noted.   Neurologic: Alert & oriented x 4, clear speech, cranial nerves II through XII intact without facial asymmetry, strengths 5 out of 5 equal bilateral upper extremities with  testing.  There is some very subtle weakness of the left dorsiflexors with lower extremity testing.  Plantar flexor testing is equal normal bilaterally.  Sensory grossly intact, no drift of upper extremity or lower extremities, no ataxia with finger to nose or heel to shin testing.    EKG  12 Lead EKG; Interpreted by me as shown below.     LABS/RADIOLOGY/PROCEDURES  Results for orders placed or performed during the hospital encounter of 11/21/19   CBC WITH DIFFERENTIAL   Result Value Ref Range    WBC 7.3 4.8 - 10.8 K/uL    RBC 5.23 4.20 - 5.40 M/uL    Hemoglobin 15.0 12.0 - 16.0 g/dL    Hematocrit 47.3 (H) 37.0 - 47.0 %    MCV 90.4 81.4 - 97.8 fL    MCH 28.7 27.0 - 33.0 pg    MCHC 31.7 (L) 33.6 - 35.0 g/dL    RDW 46.3 35.9 - 50.0 fL    Platelet Count 210 164 - 446 K/uL    MPV 10.9 9.0 - 12.9 fL    Neutrophils-Polys 56.60 44.00 - 72.00 %    Lymphocytes 27.90 22.00 - 41.00 %    Monocytes 12.40 0.00 - 13.40 %    Eosinophils 2.10 0.00 - 6.90 %    Basophils 0.70 0.00 - 1.80 %    Immature Granulocytes 0.30 0.00 - 0.90 %    Nucleated RBC 0.00 /100 WBC    Neutrophils (Absolute) 4.14 2.00 - 7.15 K/uL    Lymphs (Absolute) 2.04 1.00 - 4.80 K/uL    Monos (Absolute) 
0.91 (H) 0.00 - 0.85 K/uL    Eos (Absolute) 0.15 0.00 - 0.51 K/uL    Baso (Absolute) 0.05 0.00 - 0.12 K/uL    Immature Granulocytes (abs) 0.02 0.00 - 0.11 K/uL    NRBC (Absolute) 0.00 K/uL   COMP METABOLIC PANEL   Result Value Ref Range    Sodium 135 135 - 145 mmol/L    Potassium 4.0 3.6 - 5.5 mmol/L    Chloride 105 96 - 112 mmol/L    Co2 20 20 - 33 mmol/L    Anion Gap 10.0 0.0 - 11.9    Glucose 142 (H) 65 - 99 mg/dL    Bun 14 8 - 22 mg/dL    Creatinine 0.52 0.50 - 1.40 mg/dL    Calcium 8.4 (L) 8.5 - 10.5 mg/dL    AST(SGOT) 94 (H) 12 - 45 U/L    ALT(SGPT) 87 (H) 2 - 50 U/L    Alkaline Phosphatase 138 (H) 30 - 99 U/L    Total Bilirubin 0.3 0.1 - 1.5 mg/dL    Albumin 3.2 3.2 - 4.9 g/dL    Total Protein 7.2 6.0 - 8.2 g/dL    Globulin 4.0 (H) 1.9 - 3.5 g/dL    A-G Ratio 0.8 g/dL   PROTHROMBIN TIME   Result Value Ref Range    PT 12.5 12.0 - 14.6 sec    INR 0.92 0.87 - 1.13   APTT   Result Value Ref Range    APTT 27.8 24.7 - 36.0 sec   COD (ADULT)   Result Value Ref Range    ABO Grouping Only O     Rh Grouping Only POS     Antibody Screen-Cod NEG    TROPONIN   Result Value Ref Range    Troponin T <6 6 - 19 ng/L   URINALYSIS CULTURE, IF INDICATED   Result Value Ref Range    Color Yellow     Character Clear     Specific Gravity 1.020 <1.035    Ph 6.0 5.0 - 8.0    Glucose Negative Negative mg/dL    Ketones Negative Negative mg/dL    Protein Negative Negative mg/dL    Bilirubin Negative Negative    Urobilinogen, Urine 1.0 Negative    Nitrite Negative Negative    Leukocyte Esterase Negative Negative    Occult Blood Negative Negative    Micro Urine Req see below    ESTIMATED GFR   Result Value Ref Range    GFR If African American >60 >60 mL/min/1.73 m 2    GFR If Non African American >60 >60 mL/min/1.73 m 2   PERIPHERAL SMEAR REVIEW   Result Value Ref Range    Peripheral Smear Review see below    PLATELET ESTIMATE   Result Value Ref Range    Plt Estimation Normal    MORPHOLOGY   Result Value Ref Range    RBC Morphology Normal  
  DIFFERENTIAL COMMENT   Result Value Ref Range    Comments-Diff see below    ABO Rh Confirm   Result Value Ref Range    ABO Rh Confirm O POS    CBC WITHOUT DIFFERENTIAL   Result Value Ref Range    WBC 8.7 4.8 - 10.8 K/uL    RBC 5.03 4.20 - 5.40 M/uL    Hemoglobin 14.3 12.0 - 16.0 g/dL    Hematocrit 46.1 37.0 - 47.0 %    MCV 91.7 81.4 - 97.8 fL    MCH 28.4 27.0 - 33.0 pg    MCHC 31.0 (L) 33.6 - 35.0 g/dL    RDW 47.0 35.9 - 50.0 fL    Platelet Count 233 164 - 446 K/uL    MPV 10.5 9.0 - 12.9 fL   EKG (NOW)   Result Value Ref Range    Report       [facility] Emergency Dept.    Test Date:  2019  Pt Name:    OK NARVAEZ                Department: ER  MRN:        [id number]                      Room:  Gender:     Female                       Technician: [id number]  :        1954                   Requested By:ER TRIAGE PROTOCOL  Order #:    [id number]                    Reading MD: Ayesha Vieira    Measurements  Intervals                                Axis  Rate:       77                           P:          20  NJ:         175                          QRS:        46  QRSD:       85                           T:          47  QT:         375  QTc:        425    Interpretive Statements  Sinus rhythm rate of 77  NOrmal axis  Normal intervals  No ST elevation or depression  Q waves V2  Probable anteroseptal infarct, old  No previous ECG available for comparison  Electronically Signed On 2019 19:42:19 PST by Ayesha Vieira     Labs reviewed by me.     DX-TIBIA AND FIBULA LEFT   Final Result         1.  No acute traumatic bony injury.      DX-ANKLE 3+ VIEWS LEFT   Final Result         1.  No acute traumatic bony injury.   2.  Pes planus         CT-CEREBRAL PERFUSION ANALYSIS   Final Result      1.  Cerebral blood flow less than 30% likely representing completed infarct = 0 mL.      2.  T Max more than 6 seconds, which may be artifactual or represent a combination of completed infarct and ischemia = 
    Discussion re: Covid 19 infection, prevention, treatment limitations, importance of masks and distancing, and the available vaccines and now boosters recommended for Pfizex and Moderna at 5-6 months and then again at additional 4 months for those at high risk. The new recommendation Sept '22 to get the bivalent vaccine booster for the Omicron variant and in summer 2023 to get this vaccine again in the fall for \"high risk patients and optional for others due to anticipated flu and Covid 19 increase in the fall and winter season. New update end of Sept 2023- new booster available for everyone but highly recommended for those over 65 and anyone with significant health  problems that put them at risk of complications from a Covid infection. Additional update in spring 2024- yearly booster in the fall along with the flu shot.    I, Jenaro Gao, am scribing for and in the presence of Alek Cope MD. 5/21/24/8:57 AM EDT  I have reviewed and agree with the above note and have made corrections where appropriate Alek Arana M.D., MD, personally performed the services described in this documentation as scribed, in my presence, and it is both accurate and complete.    
13 mL.      3.  Mismatched volume, which may be artifactual or represent ischemic brain/penumbra = 13 mL.      4.  Please note that the cerebral perfusion was performed on the limited brain tissue around the basal ganglia region. Infarct/ischemia outside the CT perfusion sections can be missed in this study.      CT-HEAD W/O   Final Result      No acute intracranial hemorrhage is identified.      Extensive white matter hypodensity is present.  This is a nonspecific finding which usually is found to represent chronic microvascular disease in patient's of this demographic.  Demyelination, age indeterminant ischemia and gliosis are also common    possibilities.      Chronic right frontal and caudate head encephalomalacia/lacunar infarctions      DX-CHEST-PORTABLE (1 VIEW)   Final Result      No acute cardiopulmonary disease evident.      EC-ECHOCARDIOGRAM COMPLETE W/O CONT    (Results Pending)   MR-BRAIN-W/O    (Results Pending)   US-CAROTID DOPPLER BILAT    (Results Pending)   The radiologist's interpretation of all radiological studies have been reviewed by me.    COURSE & MEDICAL DECISION MAKING  Pertinent Labs & Imaging studies reviewed. (See chart for details)      2:32 PM Patient seen and examined at bedside. Discussed plan of care, including imaging studies and lab work to r/o recurrent stroke given her current clinical presentation. Patient agrees to the plan of care.    5:06 PM Informed patient's platelet level of 220 with clumping. Will recollect platelet level.     6:56 PM Paged Dr. uZluaga, Neurology.     7:05 PM Consulted Dr. Zuluaga, who agrees with plan to admit patient for MRI and forgo CTA to r/o recurrent stroke since the IV blew in the CT scanner.  Patient really just needs an MRI scan since her symptoms been going on for nearly 3 days now.    1940: Discussed the case with Dr. Spencer, hospitalist on-call, who will evaluate the patient for hospitalization.    Patient presents to the ER with complaint 
"of ataxia and lightheadedness for the last 3 days.  Over the last 2 days she feels as though her left leg is \"not working right.\"  Patient has a history of a stroke in the past.  Her presenting symptoms with her last stroke 2 years ago was vertigo and confusion.  She does not feel vertiginous today but she does feel like she is off balance when she walks.  She feels like her left leg is just not quite right.  Patient denies any weakness or numbness of her left upper extremity.  No diplopia.  No facial drooping.  No slurred speech.  Her NIH score is 0.  Her neurologic exam is essentially normal except for a little bit of weakness with dorsiflexor testing of the left foot/great toe.  Patient states that his results of her leg not working right, she fell a few days ago.  She complains of some pain to her left ankle and left lower tib-fib region.  X-rays were ordered.  Since patient has history of stroke I went ahead and ordered a CT/CTA.  We were unable to complete the CT/CTA because the IV bleed during the CTA.  We were, however, able to get a CT brain without contrast which was negative.  The perfusion study is abnormal but patient does have history of previous stroke in the past.  I spoke with Dr. Zuluaga, neurologist on-call.  He is fine with just proceeding to MRI scan and foregoing the CTA of the head and neck since her symptoms have been going on several days.  The patient's blood pressure is mildly elevated but not too terribly high.  She has no complaints of headache.  Work-up is otherwise unremarkable.  At this time she will be admitted to the hospital for further evaluation with MRI scanning to rule out CVA.  She was given an aspirin here in the ER today.  I discussed the case with Dr. Spencer and she will kindly evaluate the patient for hospitalization.    DISPOSITION:  Patient will be hospitalized by Dr. Spencer in guarded condition.    FINAL IMPRESSION  1. Ataxia Acute   2. Lightheadedness Acute   3. Sprain "
of left ankle, unspecified ligament, initial encounter Acute   4. Left leg weakness Acute        This dictation has been created using voice recognition software. The accuracy of the dictation is limited by the abilities of the software. I expect there may be some errors of grammar and possibly content. I made every attempt to manually correct the errors within my dictation. However, errors related to voice recognition software may still exist and should be interpreted within the appropriate context.    INeeru (Scribe), am scribing for, and in the presence of, Ayesha Vieira M.D..  Electronically signed by: Neeru Michael (Scribe), 11/21/2019  IAyesha M.D. personally performed the services described in this documentation, as scribed by Neeru Michael in my presence, and it is both accurate and complete. C.     The note accurately reflects work and decisions made by me.  Ayesha Vieira  11/21/2019  3:01 PM

## 2024-05-27 DIAGNOSIS — I10 ESSENTIAL (PRIMARY) HYPERTENSION: ICD-10-CM

## 2024-05-28 ENCOUNTER — OFFICE VISIT (OUTPATIENT)
Age: 69
End: 2024-05-28
Payer: MEDICARE

## 2024-05-28 ENCOUNTER — TELEPHONE (OUTPATIENT)
Age: 69
End: 2024-05-28

## 2024-05-28 VITALS
SYSTOLIC BLOOD PRESSURE: 138 MMHG | HEART RATE: 85 BPM | BODY MASS INDEX: 28.89 KG/M2 | HEIGHT: 62 IN | DIASTOLIC BLOOD PRESSURE: 80 MMHG | OXYGEN SATURATION: 98 % | WEIGHT: 157 LBS

## 2024-05-28 DIAGNOSIS — G43.009 MIGRAINE WITHOUT AURA, NOT INTRACTABLE, WITHOUT STATUS MIGRAINOSUS: Primary | ICD-10-CM

## 2024-05-28 DIAGNOSIS — M79.7 FIBROMYALGIA: ICD-10-CM

## 2024-05-28 PROCEDURE — G8427 DOCREV CUR MEDS BY ELIG CLIN: HCPCS | Performed by: PSYCHIATRY & NEUROLOGY

## 2024-05-28 PROCEDURE — 99214 OFFICE O/P EST MOD 30 MIN: CPT | Performed by: PSYCHIATRY & NEUROLOGY

## 2024-05-28 PROCEDURE — 3017F COLORECTAL CA SCREEN DOC REV: CPT | Performed by: PSYCHIATRY & NEUROLOGY

## 2024-05-28 PROCEDURE — 1123F ACP DISCUSS/DSCN MKR DOCD: CPT | Performed by: PSYCHIATRY & NEUROLOGY

## 2024-05-28 PROCEDURE — 3075F SYST BP GE 130 - 139MM HG: CPT | Performed by: PSYCHIATRY & NEUROLOGY

## 2024-05-28 PROCEDURE — G8400 PT W/DXA NO RESULTS DOC: HCPCS | Performed by: PSYCHIATRY & NEUROLOGY

## 2024-05-28 PROCEDURE — 3079F DIAST BP 80-89 MM HG: CPT | Performed by: PSYCHIATRY & NEUROLOGY

## 2024-05-28 PROCEDURE — 1090F PRES/ABSN URINE INCON ASSESS: CPT | Performed by: PSYCHIATRY & NEUROLOGY

## 2024-05-28 PROCEDURE — G8419 CALC BMI OUT NRM PARAM NOF/U: HCPCS | Performed by: PSYCHIATRY & NEUROLOGY

## 2024-05-28 PROCEDURE — 1036F TOBACCO NON-USER: CPT | Performed by: PSYCHIATRY & NEUROLOGY

## 2024-05-28 RX ORDER — DULOXETIN HYDROCHLORIDE 30 MG/1
30 CAPSULE, DELAYED RELEASE ORAL DAILY
Qty: 90 CAPSULE | Refills: 1 | Status: SHIPPED | OUTPATIENT
Start: 2024-05-28

## 2024-05-28 RX ORDER — METOPROLOL TARTRATE 50 MG/1
50 TABLET, FILM COATED ORAL 2 TIMES DAILY
Qty: 180 TABLET | Refills: 1 | Status: SHIPPED | OUTPATIENT
Start: 2024-05-28

## 2024-05-28 ASSESSMENT — PATIENT HEALTH QUESTIONNAIRE - PHQ9
SUM OF ALL RESPONSES TO PHQ9 QUESTIONS 1 & 2: 0
2. FEELING DOWN, DEPRESSED OR HOPELESS: NOT AT ALL
SUM OF ALL RESPONSES TO PHQ QUESTIONS 1-9: 0
SUM OF ALL RESPONSES TO PHQ QUESTIONS 1-9: 0
1. LITTLE INTEREST OR PLEASURE IN DOING THINGS: NOT AT ALL
SUM OF ALL RESPONSES TO PHQ QUESTIONS 1-9: 0
SUM OF ALL RESPONSES TO PHQ QUESTIONS 1-9: 0

## 2024-05-28 NOTE — PROGRESS NOTES
Neurology Clinic Follow up Note    Patient ID:  Delores Rogers  [id number]  1955      Ms. Rogers is here for follow up today of migraines, fibromyalgia.        Last Appointment With Me:  11/22/2023    \"Delores Rogers is presenting for evaluation of headaches. Headaches started during childhood with recent increasing frequency over the past 6-8 months.     Location: L occipital with radiation to the frontal region  Character: throbbing  Intensity: On average 8/10  Frequency: 8-10x monthly  # HA free days per month: 15-20  Duration: 4+ hours  Aura: None  Associated Sx with HA: +nausea/vomiting, +phonophotophobia.   Neurological ROS: Denies focal weakness, numbness or vision loss associated with headaches. +Dizziness/vertigo during recent ED visit with headache lasting 72h.    Systemic ROS:   Caffeine use: 1-2 cups daily  H/O Head trauma: None  Depressive or anxiety Sx: None    Any change in pattern of HA? See above    Triggers: Unknown.  Alleviating factors: Rest/sleep  FHx HA/migraine: Mother, sisters with migraines    Treatment so far: Imitrex PRN for rescue therapy-typically works well but not for her most recent severe headache, Tylenol PRN (2x weekly)  Prior preventatives: Metoprolol (started this >20 years ago)    Investigations so far: MRI Brain 12/30/2020 normal\"    Interval History:   Headaches are occurring 4x monthly, slightly improved from last visit and unchanged in character. These are lasting 3-4 hours on average. She is using Nurtec PRN for rescue therapy, tolerating medication well.   H/O fibromyalgia. Leg pains have improved with initiation of Cymbalta. Reports muscle cramping into either leg occurring 1-2x weekly.   She mentions that she may periodically break out in sweats. This occurs daily. She is uncertain what is driving this.     PMHx/ PSHx/ FHx/ SHx:  Reviewed and unchanged previous visit.   Past Medical History:   Diagnosis Date    Arthritis 12/20/2010    Cancer (HCC) 2008    right breast

## 2024-05-28 NOTE — TELEPHONE ENCOUNTER
Re: Nurtec    Rcvd PA in epic but advised no PA predicted. See last PA termed 05/20/24, located continuation request in CMM key# [id number], approval rcvd. Auth# [id number], effective 05/28/24-05/28/25, scanned to chart.

## 2024-06-04 ASSESSMENT — SLEEP AND FATIGUE QUESTIONNAIRES
HOW LIKELY ARE YOU TO NOD OFF OR FALL ASLEEP IN A CAR, WHILE STOPPED FOR A FEW MINUTES IN TRAFFIC: WOULD NEVER DOZE
FOSQ SCORE: 18.5
DO YOU GENERALLY HAVE DIFFICULTY REMEMBERING THINGS BECAUSE YOU ARE SLEEPY OR TIRED: YES, A LITTLE
DO YOU HAVE PROBLEMS WITH FREQUENT AWAKENINGS AT NIGHT: YES
AVERAGE NUMBER OF SLEEP HOURS: 6
HOW LIKELY ARE YOU TO NOD OFF OR FALL ASLEEP WHILE SITTING AND READING: SLIGHT CHANCE OF DOZING
HOW LIKELY ARE YOU TO NOD OFF OR FALL ASLEEP WHILE SITTING AND TALKING TO SOMEONE: WOULD NEVER DOZE
DO YOU TAKE NAPS: NO
ARE YOU BOTHERED BY WAKING UP TOO EARLY AND NOT BEING ABLE TO GET BACK TO SLEEP: YES
HOW LIKELY ARE YOU TO NOD OFF OR FALL ASLEEP WHILE LYING DOWN TO REST IN THE AFTERNOON WHEN CIRCUMSTANCES PERMIT: MODERATE CHANCE OF DOZING
HOW LIKELY ARE YOU TO NOD OFF OR FALL ASLEEP WHEN YOU ARE A PASSENGER IN A CAR FOR AN HOUR WITHOUT A BREAK: WOULD NEVER DOZE
HOW LIKELY ARE YOU TO NOD OFF OR FALL ASLEEP WHILE WATCHING TV: SLIGHT CHANCE OF DOZING
HOW LIKELY ARE YOU TO NOD OFF OR FALL ASLEEP WHILE SITTING AND TALKING TO SOMEONE: WOULD NEVER DOZE
DO YOU GET TOO LITTLE SLEEP AT NIGHT: YES
HAS YOUR MOOD BEEN AFFECTED BECAUSE YOU ARE SLEEPY OR TIRED: NO
HOW LIKELY ARE YOU TO NOD OFF OR FALL ASLEEP WHILE SITTING INACTIVE IN A PUBLIC PLACE: WOULD NEVER DOZE
DO YOU HAVE DIFFICULTY BEING AS ACTIVE AS YOU WANT TO BE IN THE EVENING BECAUSE YOU ARE SLEEPY OR TIRED: NO
SELECT ANY OF THE FOLLOWING BEHAVIORS OBSERVED WHILE YOU ARE ASLEEP: NONE OF THE ABOVE
DO YOU HAVE DIFFICULTY BEING AS ACTIVE AS YOU WANT TO BE IN THE MORNING BECAUSE YOU ARE SLEEPY OR TIRED: YES, LITTLE
DO YOU HAVE DIFFICULTY WATCHING A MOVIE OR VIDEO BECAUSE YOU BECOME SLEEPY OR TIRED: YES, A LITTLE
ARE YOU BOTHERED BY WAKING UP TOO EARLY AND NOT BEING ABLE TO GET BACK TO SLEEP: YES
AVERAGE NUMBER OF SLEEP HOURS: 6
NECK CIRCUMFERENCE (INCHES): 13
HOW LIKELY ARE YOU TO NOD OFF OR FALL ASLEEP WHILE LYING DOWN TO REST IN THE AFTERNOON WHEN CIRCUMSTANCES PERMIT: MODERATE CHANCE OF DOZING
DO YOU HAVE DIFFICULTY OPERATING A MOTOR VEHICLE FOR LONG DISTANCES (GREATER THAN 100 MILES) BECAUSE YOU BECOME SLEEPY: NO
ESS TOTAL SCORE: 5
DO YOU HAVE DIFFICULTY CONCENTRATING ON THE THINGS YOU DO BECAUSE YOU ARE SLEEPY OR TIRED: NO
HOW LIKELY ARE YOU TO NOD OFF OR FALL ASLEEP WHILE WATCHING TV: SLIGHT CHANCE OF DOZING
DO YOU GET TOO LITTLE SLEEP AT NIGHT: YES
HOW LIKELY ARE YOU TO NOD OFF OR FALL ASLEEP WHILE SITTING AND READING: SLIGHT CHANCE OF DOZING
DO YOU WORK SHIFTS: NO
HAS YOUR RELATIONSHIP WITH FAMILY, FRIENDS OR WORK COLLEAGUES BEEN AFFECTED BECAUSE YOU ARE SLEEPY OR TIRED: NO
NUMBER OF TIMES YOU WAKE PER NIGHT: 4
HOW LIKELY ARE YOU TO NOD OFF OR FALL ASLEEP WHILE SITTING QUIETLY AFTER LUNCH WITHOUT ALCOHOL: SLIGHT CHANCE OF DOZING
DO YOU HAVE DIFFICULTY VISITING YOUR FAMILY OR FRIENDS IN THEIR HOME BECAUSE YOU BECOME SLEEPY OR TIRED: NO
DO YOU HAVE DIFFICULTY OPERATING A MOTOR VEHICLE FOR SHORT DISTANCES (LESS THAN 100 MILES) BECAUSE YOU BECOME SLEEPY: NO
HOW LIKELY ARE YOU TO NOD OFF OR FALL ASLEEP IN A CAR, WHILE STOPPED FOR A FEW MINUTES IN TRAFFIC: WOULD NEVER DOZE
HOW LIKELY ARE YOU TO NOD OFF OR FALL ASLEEP WHEN YOU ARE A PASSENGER IN A CAR FOR AN HOUR WITHOUT A BREAK: WOULD NEVER DOZE
HOW LIKELY ARE YOU TO NOD OFF OR FALL ASLEEP WHILE SITTING QUIETLY AFTER LUNCH WITHOUT ALCOHOL: SLIGHT CHANCE OF DOZING
HOW LIKELY ARE YOU TO NOD OFF OR FALL ASLEEP WHILE SITTING INACTIVE IN A PUBLIC PLACE: WOULD NEVER DOZE

## 2024-06-05 ENCOUNTER — OFFICE VISIT (OUTPATIENT)
Age: 69
End: 2024-06-05
Payer: MEDICARE

## 2024-06-05 VITALS
BODY MASS INDEX: 29.57 KG/M2 | HEIGHT: 62 IN | DIASTOLIC BLOOD PRESSURE: 79 MMHG | HEART RATE: 69 BPM | WEIGHT: 160.7 LBS | TEMPERATURE: 98 F | SYSTOLIC BLOOD PRESSURE: 120 MMHG | OXYGEN SATURATION: 95 %

## 2024-06-05 DIAGNOSIS — I10 PRIMARY HYPERTENSION: ICD-10-CM

## 2024-06-05 DIAGNOSIS — G47.33 OSA (OBSTRUCTIVE SLEEP APNEA): Primary | ICD-10-CM

## 2024-06-05 PROCEDURE — 1123F ACP DISCUSS/DSCN MKR DOCD: CPT | Performed by: INTERNAL MEDICINE

## 2024-06-05 PROCEDURE — 3017F COLORECTAL CA SCREEN DOC REV: CPT | Performed by: INTERNAL MEDICINE

## 2024-06-05 PROCEDURE — G8400 PT W/DXA NO RESULTS DOC: HCPCS | Performed by: INTERNAL MEDICINE

## 2024-06-05 PROCEDURE — G8419 CALC BMI OUT NRM PARAM NOF/U: HCPCS | Performed by: INTERNAL MEDICINE

## 2024-06-05 PROCEDURE — G8427 DOCREV CUR MEDS BY ELIG CLIN: HCPCS | Performed by: INTERNAL MEDICINE

## 2024-06-05 PROCEDURE — 99204 OFFICE O/P NEW MOD 45 MIN: CPT | Performed by: INTERNAL MEDICINE

## 2024-06-05 PROCEDURE — 3078F DIAST BP <80 MM HG: CPT | Performed by: INTERNAL MEDICINE

## 2024-06-05 PROCEDURE — 1036F TOBACCO NON-USER: CPT | Performed by: INTERNAL MEDICINE

## 2024-06-05 PROCEDURE — 1090F PRES/ABSN URINE INCON ASSESS: CPT | Performed by: INTERNAL MEDICINE

## 2024-06-05 PROCEDURE — 3074F SYST BP LT 130 MM HG: CPT | Performed by: INTERNAL MEDICINE

## 2024-06-05 NOTE — PROGRESS NOTES
[address] [address]  Tel.  [phone]    Fax. [phone]     [address],   [place] [address]  Tel.  [phone]    Fax. [phone] [address] [place] [address]  Tel.  [phone]    Fax. [phone]       Delores Rogers is a 68 y.o. year old female referred by Dr Alek Cope for evaluation of a sleep disorder.       ASSESSMENT/PLAN:     Diagnosis Orders   1. MASSIEL (obstructive sleep apnea)  PAT - Home Sleep Test      2. Primary hypertension        3. BMI 29.0-29.9,adult            Patient has a history and examination consistent with the diagnosis of sleep apnea.    No follow-ups on file.    * The patient currently has a Low Risk for having sleep apnea.  STOP-BANG score 3.    * Sleep testing was ordered for initial evaluation.      Orders Placed This Encounter   Procedures    PAT - Home Sleep Test     Standing Status:   Future     Standing Expiration Date:   12/5/2024     Order Specific Question:   Location For Sleep Study     Answer:   [facility]       * She was provided information on sleep apnea including corresponding risk factors and the importance of proper treatment.     * Treatment options were reviewed in detail.  Patient would like to proceed with oral appliance therapy if her disorder is mild but is willing to come in for CPAP titration if the disorder is moderate to severe.    * The patient was counseled regarding proper sleep hygiene, with emphasis on ensuring sufficient total sleep time; safe driving and the benefits of exercise and weight loss.      * All of her questions were addressed.    2. Hypertension -  continue on current regimen, she will continue to monitor her BP and follow up with her primary care provider for reevaluation/adjustment of medications if warranted.  I have reviewed the relationship between hypertension as it relates to

## 2024-07-08 ENCOUNTER — HOSPITAL ENCOUNTER (OUTPATIENT)
Facility: HOSPITAL | Age: 69
Discharge: HOME | End: 2024-07-11
Payer: MEDICARE

## 2024-07-08 ENCOUNTER — PROCEDURE VISIT (OUTPATIENT)
Age: 69
End: 2024-07-08

## 2024-07-08 DIAGNOSIS — G47.33 OSA (OBSTRUCTIVE SLEEP APNEA): Primary | ICD-10-CM

## 2024-07-08 PROCEDURE — 95800 SLP STDY UNATTENDED: CPT | Performed by: INTERNAL MEDICINE

## 2024-07-11 NOTE — PROGRESS NOTES
Delores Rogers is seen today to receive a WatchPAT home sleep apnea testing (HSAT) device.    The WatchPAT HSAT Agreement was reviewed and endorsed by patient.  General information regarding operation of the HSAT device was provided.  Patient was advised to watch the WatchPAT instructional video.  Patient was advised to contact patient support for any problems using the device.  Patient was advised to complete the Morning Questionnaire after awakening/ending the test.    There were no vitals taken for this visit.    Patient will return the home sleep testing unit by noon unless otherwise approved.  Patient will be contacted once the results have been reviewed by the physician, typically within 10-15 business days.    [employer] Serial Number [id number].

## 2024-07-15 ENCOUNTER — CLINICAL DOCUMENTATION (OUTPATIENT)
Age: 69
End: 2024-07-15

## 2024-07-15 DIAGNOSIS — G47.33 OSA (OBSTRUCTIVE SLEEP APNEA): Primary | ICD-10-CM

## 2024-07-15 NOTE — PROGRESS NOTES
Delores Rogers is to be contacted by sleep technologists regarding results of WatchPAT Testing which was indicative of an average pAHI 3% of 15.8 and pAHI 4% of 2.7 per hour.  SpO2 [place] was 91% and SpO2 of < 88% was 0.0 minutes.        Patient has had a negative or inconclusive home sleep apnea test,  she should return for repeat attended testing due to presence of significant risk factors for Obstructive Sleep Apnea - STOP- BANG 3.      Encounter Diagnosis   Name Primary?    MASSIEL (obstructive sleep apnea) Yes       Orders Placed This Encounter   Procedures    SLEEP STUDY FULL     Standing Status:   Future     Standing Expiration Date:   7/15/2025

## 2024-07-16 ENCOUNTER — CLINICAL DOCUMENTATION (OUTPATIENT)
Age: 69
End: 2024-07-16

## 2024-07-17 ENCOUNTER — TELEPHONE (OUTPATIENT)
Age: 69
End: 2024-07-17

## 2024-07-17 ENCOUNTER — OFFICE VISIT (OUTPATIENT)
Age: 69
End: 2024-07-17
Payer: MEDICARE

## 2024-07-17 VITALS
SYSTOLIC BLOOD PRESSURE: 120 MMHG | DIASTOLIC BLOOD PRESSURE: 70 MMHG | HEIGHT: 62 IN | RESPIRATION RATE: 15 BRPM | WEIGHT: 165 LBS | BODY MASS INDEX: 30.36 KG/M2 | HEART RATE: 73 BPM | TEMPERATURE: 97.1 F | OXYGEN SATURATION: 98 %

## 2024-07-17 DIAGNOSIS — M47.816 SPONDYLOSIS OF LUMBAR REGION WITHOUT MYELOPATHY OR RADICULOPATHY: ICD-10-CM

## 2024-07-17 DIAGNOSIS — M48.07 SPINAL STENOSIS, LUMBOSACRAL REGION: ICD-10-CM

## 2024-07-17 DIAGNOSIS — K21.01 GASTROESOPHAGEAL REFLUX DISEASE WITH ESOPHAGITIS AND HEMORRHAGE: ICD-10-CM

## 2024-07-17 DIAGNOSIS — E53.8 VITAMIN B 12 DEFICIENCY: ICD-10-CM

## 2024-07-17 DIAGNOSIS — I10 ESSENTIAL (PRIMARY) HYPERTENSION: Primary | ICD-10-CM

## 2024-07-17 DIAGNOSIS — K21.00 GASTRO-ESOPHAGEAL REFLUX DISEASE WITH ESOPHAGITIS, WITHOUT BLEEDING: ICD-10-CM

## 2024-07-17 PROCEDURE — 1036F TOBACCO NON-USER: CPT | Performed by: FAMILY MEDICINE

## 2024-07-17 PROCEDURE — 99214 OFFICE O/P EST MOD 30 MIN: CPT | Performed by: FAMILY MEDICINE

## 2024-07-17 PROCEDURE — G8400 PT W/DXA NO RESULTS DOC: HCPCS | Performed by: FAMILY MEDICINE

## 2024-07-17 PROCEDURE — 3078F DIAST BP <80 MM HG: CPT | Performed by: FAMILY MEDICINE

## 2024-07-17 PROCEDURE — 1090F PRES/ABSN URINE INCON ASSESS: CPT | Performed by: FAMILY MEDICINE

## 2024-07-17 PROCEDURE — 1123F ACP DISCUSS/DSCN MKR DOCD: CPT | Performed by: FAMILY MEDICINE

## 2024-07-17 PROCEDURE — G8417 CALC BMI ABV UP PARAM F/U: HCPCS | Performed by: FAMILY MEDICINE

## 2024-07-17 PROCEDURE — 3074F SYST BP LT 130 MM HG: CPT | Performed by: FAMILY MEDICINE

## 2024-07-17 PROCEDURE — 3017F COLORECTAL CA SCREEN DOC REV: CPT | Performed by: FAMILY MEDICINE

## 2024-07-17 PROCEDURE — G8427 DOCREV CUR MEDS BY ELIG CLIN: HCPCS | Performed by: FAMILY MEDICINE

## 2024-07-17 ASSESSMENT — PATIENT HEALTH QUESTIONNAIRE - PHQ9
SUM OF ALL RESPONSES TO PHQ QUESTIONS 1-9: 0
2. FEELING DOWN, DEPRESSED OR HOPELESS: NOT AT ALL
1. LITTLE INTEREST OR PLEASURE IN DOING THINGS: NOT AT ALL
SUM OF ALL RESPONSES TO PHQ QUESTIONS 1-9: 0
SUM OF ALL RESPONSES TO PHQ QUESTIONS 1-9: 0
SUM OF ALL RESPONSES TO PHQ9 QUESTIONS 1 & 2: 0
SUM OF ALL RESPONSES TO PHQ QUESTIONS 1-9: 0

## 2024-07-17 ASSESSMENT — ENCOUNTER SYMPTOMS
EYE REDNESS: 0
SHORTNESS OF BREATH: 0
COUGH: 0
WHEEZING: 0
EYE PAIN: 0

## 2024-07-17 NOTE — PROGRESS NOTES
Chief Complaint   Patient presents with    Medication Refill      \"Have you been to the ER, urgent care clinic since your last visit?  Hospitalized since your last visit?\"    NO    “Have you seen or consulted any other health care providers outside of [facility] since your last visit?”    Eye dr for floater rt eye            Click Here for Release of Records Request   
standing with your arm hanging straight down and relaxed. Check it twice waiting 1 minute between the two readings. If, with either one of these 2 readings there is a > 15 point drop of the systolic compared to your sitting pressure( done before the standing BP), then let me know. Following these guidelines, continue to check your BP and write down only the ones described above and it will help me to effectively treat your blood pressure.     Recommended a weekly \"heart check.\" I went into detail how to do this. Regular exercise is very important to your health; it helps mentally, physically, socially; it prevents injuries if done properly. Exercise, even as simple as walking 20-30 minutes daily has major benefits to your health even though your \"numbers\" are the same in the lab. See if you can add this into your daily regimen and after a few months it will become a regular habit-\"just something you do,\" like brushing your teeth. A combination of aerobic exercise and strengthening and stretching is felt to be the best for you, so this should be your ultimate goal. This can be done in the privacy of your home or in a group setting as at the gym  Some prefer having a , others prefer to do exercise in groups or individually.  Do what \"works\" for you. You need to make it simple and \"fun,\" or you most likely will not continue it.     Also, discussed symptoms of concern that were noted today in the note above, treatment options( including doing nothing), when to follow up before recommended time frame. Also, answered all questions.    Talked to pt about feeling cold all of the time, discussed her migraines, and recent sleep study, which was done mainly for daytime sleepiness. She is in the process of getting a more detailed and formal sleep study since the at-home one was borderline. She has seen improvement in her daytime sleepiness by decreasing her Cymbalta, but she has multiple family members with sleep

## 2024-07-18 NOTE — TELEPHONE ENCOUNTER
Patient is scheduled for PSG sleep study  
Reviewed sleep study results with patient. She expressed understanding and is willing to proceed with an attended study. Front staff to call patient to schedule.  Thank you.  
present

## 2024-08-22 ENCOUNTER — TELEPHONE (OUTPATIENT)
Age: 69
End: 2024-08-22

## 2024-08-23 DIAGNOSIS — M79.7 FIBROMYALGIA: ICD-10-CM

## 2024-08-26 RX ORDER — TRAMADOL HYDROCHLORIDE 50 MG/1
50 TABLET ORAL EVERY 6 HOURS PRN
Qty: 90 TABLET | OUTPATIENT
Start: 2024-08-26 | End: 2024-09-25

## 2024-08-27 ENCOUNTER — TELEPHONE (OUTPATIENT)
Age: 69
End: 2024-08-27

## 2024-08-27 NOTE — TELEPHONE ENCOUNTER
----- Message from Maurizio MARCANO sent at 8/23/2024  1:16 PM EDT -----  Regarding: [facility] Appointment Request  [facility] Appointment Request    Patient needs appointment for E[facility] Appointment Type: Annual Visit.    Patient Requested Dates(s):February 2025  Patient Requested Time:  Provider Name:Alek Cope MD    Reason for Appointment Request: Established Patient - No appointments available during search  --------------------------------------------------------------------------------------------------------------------------    Relationship to Patient: Self     Call Back Information: OK to leave message on voicemail  Preferred Call Back Number: Phone [phone]

## 2024-08-29 ENCOUNTER — TELEPHONE (OUTPATIENT)
Age: 69
End: 2024-08-29

## 2024-08-29 NOTE — TELEPHONE ENCOUNTER
----- Message from Darlene CHRISTIE sent at 8/29/2024  4:10 PM EDT -----  Regarding: E[facility] Appointment Request  E[facility] Appointment Request    Patient needs appointment for E[facility] Appointment Type: New to Provider.    Patient Requested Dates(s): any day   Patient Requested Time: any time   Provider Name: Geena Esparza     Reason for Appointment Request: Established Patient - No appointments available during search  --------------------------------------------------------------------------------------------------------------------------    Relationship to Patient: Self     Call Back Information: OK to leave message on voicemail  Preferred Call Back Number: Phone [phone]

## 2024-10-23 ENCOUNTER — HOSPITAL ENCOUNTER (OUTPATIENT)
Facility: HOSPITAL | Age: 69
Discharge: HOME | End: 2024-10-26
Attending: INTERNAL MEDICINE
Payer: MEDICARE

## 2024-10-23 DIAGNOSIS — G47.33 OSA (OBSTRUCTIVE SLEEP APNEA): ICD-10-CM

## 2024-10-23 PROCEDURE — 95810 POLYSOM 6/> YRS 4/> PARAM: CPT | Performed by: INTERNAL MEDICINE

## 2024-10-24 VITALS
WEIGHT: 169.6 LBS | SYSTOLIC BLOOD PRESSURE: 134 MMHG | OXYGEN SATURATION: 95 % | TEMPERATURE: 98.1 F | HEIGHT: 62 IN | DIASTOLIC BLOOD PRESSURE: 80 MMHG | HEART RATE: 75 BPM | BODY MASS INDEX: 31.21 KG/M2

## 2024-11-14 ENCOUNTER — CLINICAL DOCUMENTATION (OUTPATIENT)
Age: 69
End: 2024-11-14

## 2024-11-14 ENCOUNTER — TRANSCRIBE ORDERS (OUTPATIENT)
Facility: HOSPITAL | Age: 69
End: 2024-11-14

## 2024-11-14 DIAGNOSIS — I10 ESSENTIAL (PRIMARY) HYPERTENSION: ICD-10-CM

## 2024-11-14 DIAGNOSIS — G47.33 OSA (OBSTRUCTIVE SLEEP APNEA): Primary | ICD-10-CM

## 2024-11-14 DIAGNOSIS — Z12.31 VISIT FOR SCREENING MAMMOGRAM: Primary | ICD-10-CM

## 2024-11-14 RX ORDER — CELECOXIB 200 MG/1
CAPSULE ORAL
Qty: 180 CAPSULE | Refills: 2 | Status: SHIPPED | OUTPATIENT
Start: 2024-11-14

## 2024-11-14 RX ORDER — METOPROLOL TARTRATE 50 MG
50 TABLET ORAL 2 TIMES DAILY
Qty: 180 TABLET | Refills: 1 | Status: SHIPPED | OUTPATIENT
Start: 2024-11-14

## 2024-11-14 NOTE — PROGRESS NOTES
Delores Rogers is to be contacted by sleep technologists regarding results of Sleep Testing which was indicative of an average pAHI 3% of 10.8 and pAHI 4% of 3.0 per hour.  SpO2 [place] was 88% and SpO2 of < 88% was 0.00 minutes.      * Treatment options were offered at initial visit.  She had elected to proceed with a trial of using an Oral Device (Mandibular Advancing Device, Tongue Retention Device, etc.) which has been shown to be effective treatment for obstructive sleep apnea.  * We have referred the patient to Dentistry for oral appliance evaluation.    The respiratory disturbance noted above occurred predominantly in supine position. Patient would therefore benefit from sleeping on the side using extra pillows or one of the commercially available sleep positioning devices.    Follow-up office visit and re-testing to be done in 3-4 months after initiation of oral appliance therapy to assess efficacy of therapy.       Encounter Diagnosis   Name Primary?    MASSIEL (obstructive sleep apnea) Yes       Orders Placed This Encounter   Procedures    Amb External Referral To Dentistry     Referral Priority:   Routine     Referral Type:   Consult for Advice and Opinion     Referral Reason:   Specialty Services Required     Referred to Provider:   Ramón Chavez DDS     Requested Specialty:   Dentistry     Number of Visits Requested:   1

## 2024-11-14 NOTE — TELEPHONE ENCOUNTER
PCP: Alek Cope MD    Last appt: 7/17/2024   Future Appointments   Date Time Provider Department Center   12/12/2024  1:00 PM SPT MAMMO 1 [facility] [place] [facility]   12/17/2024  9:30 AM [facility]   2/21/2025  1:20 PM [facility]   4/8/2025  1:00 PM Geena Esparza MD [facility] DEP       Requested Prescriptions     Pending Prescriptions Disp Refills    metoprolol tartrate (LOPRESSOR) 50 MG tablet [Pharmacy Med Name: METOPROLOL TARTRATE 50MG TABLETS] 180 tablet 1     Sig: TAKE 1 TABLET BY MOUTH TWICE DAILY    celecoxib (CELEBREX) 200 MG capsule [Pharmacy Med Name: CELECOXIB 200MG CAPSULES] 180 capsule 2     Sig: TAKE 1 CAPSULE BY MOUTH TWICE DAILY         Prior labs and Blood pressures:  BP Readings from Last 3 Encounters:   10/23/24 134/80   07/17/24 120/70   06/05/24 120/79     Lab Results   Component Value Date/Time     02/19/2024 03:22 PM    K 4.2 02/19/2024 03:22 PM     02/19/2024 03:22 PM    CO2 30 02/19/2024 03:22 PM    BUN 15 02/19/2024 03:22 PM    GFRAA >60 08/24/2022 04:05 PM     No results found for: \"HBA1C\", \"[id number]\"  Lab Results   Component Value Date/Time    CHOL 183 02/19/2024 03:22 PM    HDL 59 02/19/2024 03:22 PM    LDL 90.4 02/19/2024 03:22 PM    VLDL 33.6 02/19/2024 03:22 PM     No results found for: \"VITD3\"    Lab Results   Component Value Date/Time    TSH 1.77 02/19/2024 03:22 PM

## 2024-11-18 ENCOUNTER — TELEPHONE (OUTPATIENT)
Age: 69
End: 2024-11-18

## 2024-11-19 ENCOUNTER — TELEPHONE (OUTPATIENT)
Age: 69
End: 2024-11-19

## 2024-11-19 NOTE — TELEPHONE ENCOUNTER
Reviewed sleep study results with patient. She expressed understanding and is willing to proceed with an oral appliance.

## 2024-12-03 DIAGNOSIS — G43.009 MIGRAINE WITHOUT AURA, NOT INTRACTABLE, WITHOUT STATUS MIGRAINOSUS: ICD-10-CM

## 2024-12-03 DIAGNOSIS — M79.7 FIBROMYALGIA: ICD-10-CM

## 2024-12-03 NOTE — TELEPHONE ENCOUNTER
[facility] pharmacy is calling to request a refill of the patient's DULoxetine.

## 2024-12-04 RX ORDER — DULOXETIN HYDROCHLORIDE 30 MG/1
30 CAPSULE, DELAYED RELEASE ORAL DAILY
Qty: 30 CAPSULE | Refills: 0 | Status: SHIPPED | OUTPATIENT
Start: 2024-12-04

## 2024-12-12 ENCOUNTER — HOSPITAL ENCOUNTER (OUTPATIENT)
Facility: HOSPITAL | Age: 69
Discharge: HOME | End: 2024-12-15
Attending: FAMILY MEDICINE
Payer: MEDICARE

## 2024-12-12 DIAGNOSIS — Z12.31 VISIT FOR SCREENING MAMMOGRAM: ICD-10-CM

## 2024-12-12 PROCEDURE — 77063 BREAST TOMOSYNTHESIS BI: CPT

## 2024-12-17 ENCOUNTER — OFFICE VISIT (OUTPATIENT)
Age: 69
End: 2024-12-17
Payer: MEDICARE

## 2024-12-17 VITALS
OXYGEN SATURATION: 96 % | WEIGHT: 165 LBS | SYSTOLIC BLOOD PRESSURE: 118 MMHG | DIASTOLIC BLOOD PRESSURE: 70 MMHG | HEART RATE: 80 BPM | HEIGHT: 62 IN | BODY MASS INDEX: 30.36 KG/M2

## 2024-12-17 DIAGNOSIS — M79.7 FIBROMYALGIA: ICD-10-CM

## 2024-12-17 DIAGNOSIS — G43.009 MIGRAINE WITHOUT AURA, NOT INTRACTABLE, WITHOUT STATUS MIGRAINOSUS: Primary | ICD-10-CM

## 2024-12-17 PROCEDURE — 3017F COLORECTAL CA SCREEN DOC REV: CPT | Performed by: PSYCHIATRY & NEUROLOGY

## 2024-12-17 PROCEDURE — 99214 OFFICE O/P EST MOD 30 MIN: CPT | Performed by: PSYCHIATRY & NEUROLOGY

## 2024-12-17 PROCEDURE — 3078F DIAST BP <80 MM HG: CPT | Performed by: PSYCHIATRY & NEUROLOGY

## 2024-12-17 PROCEDURE — G8400 PT W/DXA NO RESULTS DOC: HCPCS | Performed by: PSYCHIATRY & NEUROLOGY

## 2024-12-17 PROCEDURE — 1123F ACP DISCUSS/DSCN MKR DOCD: CPT | Performed by: PSYCHIATRY & NEUROLOGY

## 2024-12-17 PROCEDURE — 1036F TOBACCO NON-USER: CPT | Performed by: PSYCHIATRY & NEUROLOGY

## 2024-12-17 PROCEDURE — G8417 CALC BMI ABV UP PARAM F/U: HCPCS | Performed by: PSYCHIATRY & NEUROLOGY

## 2024-12-17 PROCEDURE — 3074F SYST BP LT 130 MM HG: CPT | Performed by: PSYCHIATRY & NEUROLOGY

## 2024-12-17 PROCEDURE — G8484 FLU IMMUNIZE NO ADMIN: HCPCS | Performed by: PSYCHIATRY & NEUROLOGY

## 2024-12-17 PROCEDURE — 1090F PRES/ABSN URINE INCON ASSESS: CPT | Performed by: PSYCHIATRY & NEUROLOGY

## 2024-12-17 PROCEDURE — 1159F MED LIST DOCD IN RCRD: CPT | Performed by: PSYCHIATRY & NEUROLOGY

## 2024-12-17 PROCEDURE — G8427 DOCREV CUR MEDS BY ELIG CLIN: HCPCS | Performed by: PSYCHIATRY & NEUROLOGY

## 2024-12-17 PROCEDURE — 1126F AMNT PAIN NOTED NONE PRSNT: CPT | Performed by: PSYCHIATRY & NEUROLOGY

## 2024-12-17 RX ORDER — DULOXETIN HYDROCHLORIDE 30 MG/1
30 CAPSULE, DELAYED RELEASE ORAL DAILY
Qty: 90 CAPSULE | Refills: 1 | Status: SHIPPED | OUTPATIENT
Start: 2024-12-17

## 2024-12-17 NOTE — PROGRESS NOTES
Neurology Clinic Follow up Note    Patient ID:  Delores Rogers  [id number]  1955      Ms. Rogers is here for follow up today of migraines, fibromyalgia.        Last Appointment With Me:  5/28/2024    \"Delores Rogers is presenting for evaluation of headaches. Headaches started during childhood with recent increasing frequency over the past 6-8 months.     Location: L occipital with radiation to the frontal region  Character: throbbing  Intensity: On average 8/10  Frequency: 8-10x monthly  # HA free days per month: 15-20  Duration: 4+ hours  Aura: None  Associated Sx with HA: +nausea/vomiting, +phonophotophobia.   Neurological ROS: Denies focal weakness, numbness or vision loss associated with headaches. +Dizziness/vertigo during recent ED visit with headache lasting 72h.    Systemic ROS:   Caffeine use: 1-2 cups daily  H/O Head trauma: None  Depressive or anxiety Sx: None    Any change in pattern of HA? See above    Triggers: Unknown.  Alleviating factors: Rest/sleep  FHx HA/migraine: Mother, sisters with migraines    Treatment so far: Imitrex PRN for rescue therapy-typically works well but not for her most recent severe headache, Tylenol PRN (2x weekly)  Prior preventatives: Metoprolol (started this >20 years ago)    Investigations so far: MRI Brain 12/30/2020 normal\"    Interval History:   Headaches are occurring 4x monthly, unchanged in character. These are lasting 3-4 hours on average. She is using Nurtec PRN for rescue therapy, tolerating medication well.   H/O fibromyalgia. Leg pains have improved with initiation of Cymbalta. Cymbalta dose reduced after last visit due to diaphoresis which is now improved.   She mentions some pruritus and erythema over her face/neck region over the past several weeks.     PMHx/ PSHx/ FHx/ SHx:  Reviewed and unchanged previous visit.   Past Medical History:   Diagnosis Date    Arthritis 12/20/2010    Cancer (HCC) 2008    right breast cancer    Colon polyp     Daytime somnolence

## 2025-01-24 NOTE — TELEPHONE ENCOUNTER
PCP: Alek Cope MD    Last appt: 2/19/2024       Future Appointments   Date Time Provider Department Center   5/13/2024  3:40 PM Braden Dave MD [facility] BS AMB   5/28/2024  9:40 AM Maricarmen Koenig,  [facility] AMB   6/28/2024 10:30 AM Nadine Yin MD [facility] BS AMB       Requested Prescriptions     Pending Prescriptions Disp Refills    celecoxib (CELEBREX) 200 MG capsule [Pharmacy Med Name: CELECOXIB 200MG CAPSULES] 180 capsule      Sig: TAKE 1 CAPSULE BY MOUTH TWICE DAILY       Prior labs and Blood pressures:  BP Readings from Last 3 Encounters:   02/19/24 132/72   11/22/23 132/80   11/07/23 106/72     Lab Results   Component Value Date/Time     02/19/2024 03:22 PM    K 4.2 02/19/2024 03:22 PM     02/19/2024 03:22 PM    CO2 30 02/19/2024 03:22 PM    BUN 15 02/19/2024 03:22 PM    GFRAA >60 08/24/2022 04:05 PM     No results found for: \"HBA1C\", \"[id number]\"  Lab Results   Component Value Date/Time    CHOL 183 02/19/2024 03:22 PM    HDL 59 02/19/2024 03:22 PM     No results found for: \"VITD3\", \"VD3RIA\"    Lab Results   Component Value Date/Time    TSH 1.31 02/15/2023 03:43 PM         Yes...

## 2025-04-06 SDOH — HEALTH STABILITY: PHYSICAL HEALTH: ON AVERAGE, HOW MANY MINUTES DO YOU ENGAGE IN EXERCISE AT THIS LEVEL?: 20 MIN

## 2025-04-06 SDOH — HEALTH STABILITY: PHYSICAL HEALTH: ON AVERAGE, HOW MANY DAYS PER WEEK DO YOU ENGAGE IN MODERATE TO STRENUOUS EXERCISE (LIKE A BRISK WALK)?: 2 DAYS

## 2025-04-08 ENCOUNTER — OFFICE VISIT (OUTPATIENT)
Age: 70
End: 2025-04-08
Payer: MEDICARE

## 2025-04-08 VITALS
SYSTOLIC BLOOD PRESSURE: 136 MMHG | HEIGHT: 62 IN | HEART RATE: 78 BPM | BODY MASS INDEX: 31.83 KG/M2 | OXYGEN SATURATION: 97 % | TEMPERATURE: 98.2 F | DIASTOLIC BLOOD PRESSURE: 85 MMHG | WEIGHT: 173 LBS | RESPIRATION RATE: 16 BRPM

## 2025-04-08 DIAGNOSIS — I10 ESSENTIAL HYPERTENSION: ICD-10-CM

## 2025-04-08 DIAGNOSIS — M85.88 OSTEOPENIA OF LUMBAR SPINE: ICD-10-CM

## 2025-04-08 DIAGNOSIS — E55.9 VITAMIN D DEFICIENCY: ICD-10-CM

## 2025-04-08 DIAGNOSIS — Z76.89 ENCOUNTER TO ESTABLISH CARE WITH NEW DOCTOR: Primary | ICD-10-CM

## 2025-04-08 DIAGNOSIS — M79.7 FIBROMYALGIA: ICD-10-CM

## 2025-04-08 DIAGNOSIS — Z78.0 MENOPAUSE: ICD-10-CM

## 2025-04-08 DIAGNOSIS — K21.9 GASTROESOPHAGEAL REFLUX DISEASE, UNSPECIFIED WHETHER ESOPHAGITIS PRESENT: ICD-10-CM

## 2025-04-08 PROCEDURE — 1090F PRES/ABSN URINE INCON ASSESS: CPT | Performed by: STUDENT IN AN ORGANIZED HEALTH CARE EDUCATION/TRAINING PROGRAM

## 2025-04-08 PROCEDURE — G8427 DOCREV CUR MEDS BY ELIG CLIN: HCPCS | Performed by: STUDENT IN AN ORGANIZED HEALTH CARE EDUCATION/TRAINING PROGRAM

## 2025-04-08 PROCEDURE — 99215 OFFICE O/P EST HI 40 MIN: CPT | Performed by: STUDENT IN AN ORGANIZED HEALTH CARE EDUCATION/TRAINING PROGRAM

## 2025-04-08 PROCEDURE — 3075F SYST BP GE 130 - 139MM HG: CPT | Performed by: STUDENT IN AN ORGANIZED HEALTH CARE EDUCATION/TRAINING PROGRAM

## 2025-04-08 PROCEDURE — 1036F TOBACCO NON-USER: CPT | Performed by: STUDENT IN AN ORGANIZED HEALTH CARE EDUCATION/TRAINING PROGRAM

## 2025-04-08 PROCEDURE — 3079F DIAST BP 80-89 MM HG: CPT | Performed by: STUDENT IN AN ORGANIZED HEALTH CARE EDUCATION/TRAINING PROGRAM

## 2025-04-08 PROCEDURE — G8417 CALC BMI ABV UP PARAM F/U: HCPCS | Performed by: STUDENT IN AN ORGANIZED HEALTH CARE EDUCATION/TRAINING PROGRAM

## 2025-04-08 PROCEDURE — 1159F MED LIST DOCD IN RCRD: CPT | Performed by: STUDENT IN AN ORGANIZED HEALTH CARE EDUCATION/TRAINING PROGRAM

## 2025-04-08 PROCEDURE — 1160F RVW MEDS BY RX/DR IN RCRD: CPT | Performed by: STUDENT IN AN ORGANIZED HEALTH CARE EDUCATION/TRAINING PROGRAM

## 2025-04-08 PROCEDURE — G8400 PT W/DXA NO RESULTS DOC: HCPCS | Performed by: STUDENT IN AN ORGANIZED HEALTH CARE EDUCATION/TRAINING PROGRAM

## 2025-04-08 PROCEDURE — 1126F AMNT PAIN NOTED NONE PRSNT: CPT | Performed by: STUDENT IN AN ORGANIZED HEALTH CARE EDUCATION/TRAINING PROGRAM

## 2025-04-08 PROCEDURE — 3017F COLORECTAL CA SCREEN DOC REV: CPT | Performed by: STUDENT IN AN ORGANIZED HEALTH CARE EDUCATION/TRAINING PROGRAM

## 2025-04-08 PROCEDURE — 1123F ACP DISCUSS/DSCN MKR DOCD: CPT | Performed by: STUDENT IN AN ORGANIZED HEALTH CARE EDUCATION/TRAINING PROGRAM

## 2025-04-08 RX ORDER — ESOMEPRAZOLE MAGNESIUM 40 MG/1
40 CAPSULE, DELAYED RELEASE ORAL 2 TIMES DAILY
Qty: 180 CAPSULE | Refills: 3 | Status: SHIPPED | OUTPATIENT
Start: 2025-04-08

## 2025-04-08 SDOH — ECONOMIC STABILITY: FOOD INSECURITY: WITHIN THE PAST 12 MONTHS, YOU WORRIED THAT YOUR FOOD WOULD RUN OUT BEFORE YOU GOT MONEY TO BUY MORE.: NEVER TRUE

## 2025-04-08 SDOH — ECONOMIC STABILITY: FOOD INSECURITY: WITHIN THE PAST 12 MONTHS, THE FOOD YOU BOUGHT JUST DIDN'T LAST AND YOU DIDN'T HAVE MONEY TO GET MORE.: NEVER TRUE

## 2025-04-08 ASSESSMENT — PATIENT HEALTH QUESTIONNAIRE - PHQ9
SUM OF ALL RESPONSES TO PHQ QUESTIONS 1-9: 0
SUM OF ALL RESPONSES TO PHQ QUESTIONS 1-9: 0
2. FEELING DOWN, DEPRESSED OR HOPELESS: NOT AT ALL
SUM OF ALL RESPONSES TO PHQ QUESTIONS 1-9: 0
1. LITTLE INTEREST OR PLEASURE IN DOING THINGS: NOT AT ALL
SUM OF ALL RESPONSES TO PHQ QUESTIONS 1-9: 0

## 2025-04-08 NOTE — PROGRESS NOTES
Delores Rogers is a 69 y.o. year old female who is a new patient to me today (25).  She was previous followed by Dr Alek Cope, last seen 2024.      Assessment & Plan:   1. Encounter to establish care with new doctor  - reviewed progress note from Dr Cope 2024 and labs from 2024  2. Essential hypertension  Assessment & Plan:  Controlled, cont metoprolol   Orders:  -     CBC; Future  -     Comprehensive Metabolic Panel; Future  3. Fibromyalgia  Assessment & Plan:  Likely with some OA as well  Cont cymbalta which is prescribed by neurology  Discussed complications from long term NSAID use such as CKD and gastritis/ulcer. She will try going down to celebrex daily with goal to use only PRN in the future  4. Osteopenia of lumbar spine  Assessment & Plan:  She tells me she had DEXA at [facility] since  - will request  It has been >2yr since her last DEXA - rpt ordered  She is taking a high dose of vit D, will check level to guide further supplementation   Cont weight bearing exercise  5. Menopause  -     DEXA BONE DENSITY AXIAL SKELETON; Future  6. Vitamin D deficiency  Assessment & Plan:  Update lab to make sure she is not overdosing.    Orders:  -     Vitamin D 25 Hydroxy; Future  7. Gastroesophageal reflux disease, unspecified whether esophagitis present  Assessment & Plan:  Controlled, cont nexium BID     Health Maintenance   Flu vaccine:  COVID vaccine:  RSV:  Tetanus vaccine:  Shingles vaccine:  Pneumonia vaccine:  Cervical cancer screening:  Breast cancer screenin2024  Colon cancer screenin2023 Q3yr  DEXA:  Lung cancer screening:  Hep C: 2024  HIV:  Lipid: 2024 @5yr  DM:  Healthcare decision maker: daughter, CLAIRE. Son has schizophrenia and she doesn't feel he would be a good decision maker  ACP: information provided, will follow-up next visit    RTC: 3 mo follow-up/AWV    Subjective:   Delores was seen today for New Patient    HTN  - metoprolol   - home /80    GERD  - nexium

## 2025-04-08 NOTE — ASSESSMENT & PLAN NOTE
She tells me she had DEXA at [facility] since 2016 - will request  It has been >2yr since her last DEXA - rpt ordered  She is taking a high dose of vit D, will check level to guide further supplementation   Cont weight bearing exercise

## 2025-04-08 NOTE — ASSESSMENT & PLAN NOTE
Likely with some OA as well  Cont cymbalta which is prescribed by neurology  Discussed complications from long term NSAID use such as CKD and gastritis/ulcer. She will try going down to celebrex daily with goal to use only PRN in the future

## 2025-04-09 LAB
25(OH)D3 SERPL-MCNC: 89.8 NG/ML (ref 30–100)
ALBUMIN SERPL-MCNC: 3.9 G/DL (ref 3.5–5)
ALBUMIN/GLOB SERPL: 1.1 (ref 1.1–2.2)
ALP SERPL-CCNC: 207 U/L (ref 45–117)
ALT SERPL-CCNC: 35 U/L (ref 12–78)
ANION GAP SERPL CALC-SCNC: 3 MMOL/L (ref 2–12)
AST SERPL-CCNC: 27 U/L (ref 15–37)
BILIRUB SERPL-MCNC: 0.6 MG/DL (ref 0.2–1)
BUN SERPL-MCNC: 17 MG/DL (ref 6–20)
BUN/CREAT SERPL: 21 (ref 12–20)
CALCIUM SERPL-MCNC: 9.5 MG/DL (ref 8.5–10.1)
CHLORIDE SERPL-SCNC: 108 MMOL/L (ref 97–108)
CO2 SERPL-SCNC: 29 MMOL/L (ref 21–32)
CREAT SERPL-MCNC: 0.82 MG/DL (ref 0.55–1.02)
ERYTHROCYTE [DISTWIDTH] IN BLOOD BY AUTOMATED COUNT: 13.7 % (ref 11.5–14.5)
GLOBULIN SER CALC-MCNC: 3.5 G/DL (ref 2–4)
GLUCOSE SERPL-MCNC: 100 MG/DL (ref 65–100)
HCT VFR BLD AUTO: 41 % (ref 35–47)
HGB BLD-MCNC: 13.5 G/DL (ref 11.5–16)
MCH RBC QN AUTO: 30.3 PG (ref 26–34)
MCHC RBC AUTO-ENTMCNC: 32.9 G/DL (ref 30–36.5)
MCV RBC AUTO: 91.9 FL (ref 80–99)
NRBC # BLD: 0 K/UL (ref 0–0.01)
NRBC BLD-RTO: 0 PER 100 WBC
PLATELET # BLD AUTO: 325 K/UL (ref 150–400)
PMV BLD AUTO: 9.9 FL (ref 8.9–12.9)
POTASSIUM SERPL-SCNC: 4.6 MMOL/L (ref 3.5–5.1)
PROT SERPL-MCNC: 7.4 G/DL (ref 6.4–8.2)
RBC # BLD AUTO: 4.46 M/UL (ref 3.8–5.2)
SODIUM SERPL-SCNC: 140 MMOL/L (ref 136–145)
WBC # BLD AUTO: 9.4 K/UL (ref 3.6–11)

## 2025-04-14 ENCOUNTER — RESULTS FOLLOW-UP (OUTPATIENT)
Age: 70
End: 2025-04-14

## 2025-04-14 NOTE — RESULT ENCOUNTER NOTE
Will send MCM  - normal CBC and CMP  - alk phos elevation noted 4+ yr  - Vit D level is high - stop for now. Will resume couple months down the line.

## 2025-04-16 ENCOUNTER — HOSPITAL ENCOUNTER (OUTPATIENT)
Facility: HOSPITAL | Age: 70
Discharge: HOME | End: 2025-04-19
Attending: STUDENT IN AN ORGANIZED HEALTH CARE EDUCATION/TRAINING PROGRAM
Payer: MEDICARE

## 2025-04-16 DIAGNOSIS — Z78.0 MENOPAUSE: ICD-10-CM

## 2025-04-16 PROCEDURE — 77080 DXA BONE DENSITY AXIAL: CPT

## 2025-05-20 DIAGNOSIS — I10 ESSENTIAL (PRIMARY) HYPERTENSION: ICD-10-CM

## 2025-05-20 RX ORDER — METOPROLOL TARTRATE 50 MG
50 TABLET ORAL 2 TIMES DAILY
Qty: 180 TABLET | Refills: 1 | OUTPATIENT
Start: 2025-05-20

## 2025-05-21 DIAGNOSIS — I10 ESSENTIAL (PRIMARY) HYPERTENSION: ICD-10-CM

## 2025-05-21 RX ORDER — METOPROLOL TARTRATE 50 MG
50 TABLET ORAL 2 TIMES DAILY
Qty: 180 TABLET | Refills: 1 | OUTPATIENT
Start: 2025-05-21

## 2025-05-21 RX ORDER — METOPROLOL TARTRATE 50 MG
50 TABLET ORAL 2 TIMES DAILY
Qty: 180 TABLET | Refills: 1 | Status: CANCELLED | OUTPATIENT
Start: 2025-05-21

## 2025-05-21 RX ORDER — METOPROLOL TARTRATE 50 MG
50 TABLET ORAL 2 TIMES DAILY
Qty: 180 TABLET | Refills: 1 | Status: SHIPPED | OUTPATIENT
Start: 2025-05-21

## 2025-05-21 NOTE — TELEPHONE ENCOUNTER
Pt last seen on 4/8/2025. Due to return in 3 months.    Has appt on 7/10/2025.    Rx last filled on 11/14/24 #180/1RF by prior provider.    Will forward to MD for refill.

## 2025-05-21 NOTE — TELEPHONE ENCOUNTER
Patient states her previous doctor retired -- Dr. Alek Cope with [facility], and she now sees Dr. Esparza.  Her pharmacy sent a request for a refill of her metoprolol to the previous practice.  Patient decided to call directly here to request a new prescription be sent to her pharmacy.     metoprolol tartrate (LOPRESSOR) 50 MG tablet    [facility] #[id number]

## 2025-07-05 SDOH — HEALTH STABILITY: PHYSICAL HEALTH: ON AVERAGE, HOW MANY DAYS PER WEEK DO YOU ENGAGE IN MODERATE TO STRENUOUS EXERCISE (LIKE A BRISK WALK)?: 3 DAYS

## 2025-07-05 SDOH — HEALTH STABILITY: PHYSICAL HEALTH: ON AVERAGE, HOW MANY MINUTES DO YOU ENGAGE IN EXERCISE AT THIS LEVEL?: 30 MIN

## 2025-07-05 ASSESSMENT — PATIENT HEALTH QUESTIONNAIRE - PHQ9
SUM OF ALL RESPONSES TO PHQ QUESTIONS 1-9: 0
1. LITTLE INTEREST OR PLEASURE IN DOING THINGS: NOT AT ALL
SUM OF ALL RESPONSES TO PHQ QUESTIONS 1-9: 0
SUM OF ALL RESPONSES TO PHQ QUESTIONS 1-9: 0
2. FEELING DOWN, DEPRESSED OR HOPELESS: NOT AT ALL
SUM OF ALL RESPONSES TO PHQ QUESTIONS 1-9: 0

## 2025-07-05 ASSESSMENT — LIFESTYLE VARIABLES
HOW OFTEN DO YOU HAVE SIX OR MORE DRINKS ON ONE OCCASION: 1
HOW MANY STANDARD DRINKS CONTAINING ALCOHOL DO YOU HAVE ON A TYPICAL DAY: PATIENT DOES NOT DRINK
HOW MANY STANDARD DRINKS CONTAINING ALCOHOL DO YOU HAVE ON A TYPICAL DAY: 0
HOW OFTEN DO YOU HAVE A DRINK CONTAINING ALCOHOL: NEVER
HOW OFTEN DO YOU HAVE A DRINK CONTAINING ALCOHOL: 1

## 2025-07-10 ENCOUNTER — OFFICE VISIT (OUTPATIENT)
Age: 70
End: 2025-07-10
Payer: MEDICARE

## 2025-07-10 VITALS
HEART RATE: 75 BPM | SYSTOLIC BLOOD PRESSURE: 112 MMHG | OXYGEN SATURATION: 97 % | HEIGHT: 61 IN | TEMPERATURE: 98.3 F | DIASTOLIC BLOOD PRESSURE: 74 MMHG | BODY MASS INDEX: 32.7 KG/M2 | WEIGHT: 173.2 LBS | RESPIRATION RATE: 16 BRPM

## 2025-07-10 DIAGNOSIS — M79.7 FIBROMYALGIA: ICD-10-CM

## 2025-07-10 DIAGNOSIS — I10 ESSENTIAL (PRIMARY) HYPERTENSION: ICD-10-CM

## 2025-07-10 DIAGNOSIS — M19.90 ARTHRITIS: ICD-10-CM

## 2025-07-10 DIAGNOSIS — K21.9 GASTROESOPHAGEAL REFLUX DISEASE, UNSPECIFIED WHETHER ESOPHAGITIS PRESENT: ICD-10-CM

## 2025-07-10 DIAGNOSIS — Z00.00 MEDICARE ANNUAL WELLNESS VISIT, SUBSEQUENT: Primary | ICD-10-CM

## 2025-07-10 DIAGNOSIS — E67.3 HYPERVITAMINOSIS D: ICD-10-CM

## 2025-07-10 DIAGNOSIS — M85.88 OSTEOPENIA OF LUMBAR SPINE: ICD-10-CM

## 2025-07-10 DIAGNOSIS — I10 ESSENTIAL HYPERTENSION: ICD-10-CM

## 2025-07-10 PROCEDURE — 99214 OFFICE O/P EST MOD 30 MIN: CPT | Performed by: STUDENT IN AN ORGANIZED HEALTH CARE EDUCATION/TRAINING PROGRAM

## 2025-07-10 PROCEDURE — 3078F DIAST BP <80 MM HG: CPT | Performed by: STUDENT IN AN ORGANIZED HEALTH CARE EDUCATION/TRAINING PROGRAM

## 2025-07-10 PROCEDURE — 1125F AMNT PAIN NOTED PAIN PRSNT: CPT | Performed by: STUDENT IN AN ORGANIZED HEALTH CARE EDUCATION/TRAINING PROGRAM

## 2025-07-10 PROCEDURE — 1159F MED LIST DOCD IN RCRD: CPT | Performed by: STUDENT IN AN ORGANIZED HEALTH CARE EDUCATION/TRAINING PROGRAM

## 2025-07-10 PROCEDURE — G0439 PPPS, SUBSEQ VISIT: HCPCS | Performed by: STUDENT IN AN ORGANIZED HEALTH CARE EDUCATION/TRAINING PROGRAM

## 2025-07-10 PROCEDURE — 1090F PRES/ABSN URINE INCON ASSESS: CPT | Performed by: STUDENT IN AN ORGANIZED HEALTH CARE EDUCATION/TRAINING PROGRAM

## 2025-07-10 PROCEDURE — G8399 PT W/DXA RESULTS DOCUMENT: HCPCS | Performed by: STUDENT IN AN ORGANIZED HEALTH CARE EDUCATION/TRAINING PROGRAM

## 2025-07-10 PROCEDURE — 3017F COLORECTAL CA SCREEN DOC REV: CPT | Performed by: STUDENT IN AN ORGANIZED HEALTH CARE EDUCATION/TRAINING PROGRAM

## 2025-07-10 PROCEDURE — G8427 DOCREV CUR MEDS BY ELIG CLIN: HCPCS | Performed by: STUDENT IN AN ORGANIZED HEALTH CARE EDUCATION/TRAINING PROGRAM

## 2025-07-10 PROCEDURE — 1123F ACP DISCUSS/DSCN MKR DOCD: CPT | Performed by: STUDENT IN AN ORGANIZED HEALTH CARE EDUCATION/TRAINING PROGRAM

## 2025-07-10 PROCEDURE — 3074F SYST BP LT 130 MM HG: CPT | Performed by: STUDENT IN AN ORGANIZED HEALTH CARE EDUCATION/TRAINING PROGRAM

## 2025-07-10 PROCEDURE — 1036F TOBACCO NON-USER: CPT | Performed by: STUDENT IN AN ORGANIZED HEALTH CARE EDUCATION/TRAINING PROGRAM

## 2025-07-10 PROCEDURE — 1160F RVW MEDS BY RX/DR IN RCRD: CPT | Performed by: STUDENT IN AN ORGANIZED HEALTH CARE EDUCATION/TRAINING PROGRAM

## 2025-07-10 PROCEDURE — G8417 CALC BMI ABV UP PARAM F/U: HCPCS | Performed by: STUDENT IN AN ORGANIZED HEALTH CARE EDUCATION/TRAINING PROGRAM

## 2025-07-10 RX ORDER — CELECOXIB 200 MG/1
200 CAPSULE ORAL DAILY
Qty: 90 CAPSULE | Refills: 1 | Status: SHIPPED | OUTPATIENT
Start: 2025-07-10

## 2025-07-10 RX ORDER — CYCLOSPORINE 0.5 MG/ML
1 EMULSION OPHTHALMIC EVERY 12 HOURS
COMMUNITY
Start: 2025-07-09

## 2025-07-10 NOTE — PATIENT INSTRUCTIONS
or using tobacco. It also includes taking medicines as directed, managing other health conditions, and trying to get a healthy amount of sleep.  Follow-up care is a key part of your treatment and safety. Be sure to make and go to all appointments, and call your doctor if you are having problems. It's also a good idea to know your test results and keep a list of the medicines you take.  How can you care for yourself at home?  Diet    Use less salt when you cook and eat. This helps lower your blood pressure. Taste food before salting. Add only a little salt when you think you need it. With time, your taste buds will adjust to less salt.     Eat fewer snack items, fast foods, canned soups, and other high-salt, high-fat, processed foods.     Read food labels and try to avoid saturated and trans fats. They increase your risk of heart disease by raising cholesterol levels.     Limit the amount of solid fat--butter, margarine, and shortening--you eat. Use olive, peanut, or canola oil when you cook. Bake, broil, and steam foods instead of frying them.     Eat a variety of fruit and vegetables every day. Dark green, deep orange, red, or yellow fruits and vegetables are especially good for you. Examples include spinach, carrots, peaches, and berries.     Foods high in fiber can reduce your cholesterol and provide important vitamins and minerals. High-fiber foods include whole-grain cereals and breads, oatmeal, beans, brown rice, citrus fruits, and apples.     Eat lean proteins. Heart-healthy proteins include seafood, lean meats and poultry, eggs, beans, peas, nuts, seeds, and soy products.     Limit drinks and foods with added sugar. These include candy, desserts, and soda pop.   Heart-healthy lifestyle    If your doctor recommends it, get more exercise. For many people, walking is a good choice. Or you may want to swim, bike, or do other activities. Bit by bit, increase the time you're active every day. Try for at least 30  No

## 2025-07-10 NOTE — ASSESSMENT & PLAN NOTE
She tried to stop celebrex but having more diffuse arthralgias.  She will try tylenol and topical diclofenac  Discussed she may use celebrex PRN, try to avoid daily use if able. She understands the side effects of chronic NSAIDS (renal disease, GI ulcers) and accepts these risks  Will also check inflammatory markers to see if there is underlying RA

## 2025-07-10 NOTE — PROGRESS NOTES
Fibromyalgia  OA/lumbar back pain  - cymbalta  - celebrex BID --> QD. Ran out the first week of . She has more pain in wrists and hands, knees, legs.      GERD  - nexium BID     Osteopenia  - Vit D 5000 IU daily --> down to 2000 IU daily when level returned 89  - gardening, yard work, walking  - DEXA 2025 osteopenia low FRAX     Migraine  - Neuro Dr Koenig  - nurtec     Mild sleep apnea. Dr Argueta referred her for oral appliance but dentist told her that her sleep study results were not severe enough for insurance to cover oral appliance.      Eye Dr Salas      GI Dr Blake  Will take fiber and docusate for constipation     Patient's complete Health Risk Assessment and screening values have been reviewed and are found in Flowsheets. The following problems were reviewed today and where indicated follow up appointments were made and/or referrals ordered.    Positive Risk Factor Screenings with Interventions:                Abnormal BMI (obese):  Body mass index is 32.53 kg/m². (!) Abnormal  Interventions:  Patient declines any further evaluation or treatment              Advanced Directives:  Do you have a Living Will?: (!) No    Intervention:  has NO advanced directive - information provided             Flu vaccine: 2025  COVID vaccine:   RSV:  Tetanus vaccine: 2009 - reminded booster is due  Shingles vaccine: UTD  Pneumonia vaccine: rec PCV20   Cervical cancer screening: up to date until COVID pandemic, no longer screening due to age  Breast cancer screening: (hx breast cancer) 2024  Colon cancer screenin2023 Q3yr  DEXA:  Hep C: 2024  Lipid: 2024 @5yr  DM:  Healthcare decision maker: daughter, sister Rica . Son has schizophrenia and she doesn't feel he would be a good decision maker   ACP: information provided, she is not ready to sign today, wants to talk with her decision makers first        Objective   Vitals:    07/10/25 1253   BP: 112/74   Pulse: 75   Resp: 16   Temp: 98.3 °F (36.8

## 2025-07-11 NOTE — ASSESSMENT & PLAN NOTE
Reviewed DEXA 4/2025, osteopenia low FRAX  Her vitamin D level has been high. I told her to stop the D3 5000 IU daily but inadvertently recommended she start D3 2000 IU daily when her DEXA results came back. Will again check level and if >60 advise holding D supplementation for 6 mo.

## 2025-07-11 NOTE — ASSESSMENT & PLAN NOTE
Cont cymbalta which is prescribed by neurology. Discussed this does cause increased perspiration.  See arthralgias

## 2025-07-12 LAB
25(OH)D3 SERPL-MCNC: 70.2 NG/ML (ref 30–100)
CRP SERPL HS-MCNC: >9.5 MG/L
ERYTHROCYTE [SEDIMENTATION RATE] IN BLOOD: 38 MM/HR (ref 0–30)

## 2025-07-20 LAB
CCP IGA+IGG SERPL IA-ACNC: <20 UNITS
RHEUMATOID FACT SERPL-ACNC: <14 UNITS/ML

## 2025-07-23 LAB
14-3-3 ETA AG SER IA-MCNC: <0.2 NG/ML
CCP IGA+IGG SERPL IA-ACNC: <20 UNITS
RHEUMATOID FACT SERPL-ACNC: <14 UNITS/ML

## 2025-07-30 ENCOUNTER — TELEPHONE (OUTPATIENT)
Age: 70
End: 2025-07-30

## 2025-08-20 DIAGNOSIS — I10 ESSENTIAL (PRIMARY) HYPERTENSION: ICD-10-CM

## 2025-08-20 RX ORDER — METOPROLOL TARTRATE 50 MG
50 TABLET ORAL 2 TIMES DAILY
Qty: 180 TABLET | Refills: 1 | Status: SHIPPED | OUTPATIENT
Start: 2025-08-20

## (undated) DEVICE — SNARE ENDOSCP M L240CM W27MM SHTH DIA2.4MM CHN 2.8MM OVL

## (undated) DEVICE — SNARE VASC L240CM LOOP W10MM SHTH DIA2.4MM RND STIFF CLD

## (undated) DEVICE — ELECTRODE PT RET AD L9FT HI MOIST COND ADH HYDRGEL CORDED

## (undated) DEVICE — TRAP SURG QUAD PARABOLA SLOT DSGN SFTY SCRN TRAPEASE

## (undated) DEVICE — FORCEPS BX L240CM JAW DIA2.8MM L CAP W/ NDL MIC MESH TOOTH